# Patient Record
Sex: FEMALE | Race: WHITE | Employment: OTHER | URBAN - METROPOLITAN AREA
[De-identification: names, ages, dates, MRNs, and addresses within clinical notes are randomized per-mention and may not be internally consistent; named-entity substitution may affect disease eponyms.]

---

## 2017-01-06 ENCOUNTER — TELEPHONE (OUTPATIENT)
Dept: OBGYN CLINIC | Facility: CLINIC | Age: 42
End: 2017-01-06

## 2017-01-06 NOTE — TELEPHONE ENCOUNTER
Pt notified. Verbalized understanding. Pt requesting sooner apt. Per irina Macdonald to schedule  1/12/17 in [facility][1]. Pt scheduled. Call if any questions or concerns.

## 2017-01-06 NOTE — TELEPHONE ENCOUNTER
Pt c/o cramping with menses. Cramping 7/10, no improvement with   Ibuprofen or tylenol. LMP 1/4/17. Pt stopped ocp's due to migraines and PCP rec. Pt advised Dr. Alessandra Hollis does not rec ocp's and will disc.  Options at apt 2/1/17  Pt did well on ocp's

## 2017-01-12 ENCOUNTER — OFFICE VISIT (OUTPATIENT)
Dept: OBGYN CLINIC | Facility: CLINIC | Age: 42
End: 2017-01-12

## 2017-01-12 VITALS — SYSTOLIC BLOOD PRESSURE: 118 MMHG | WEIGHT: 194 LBS | BODY MASS INDEX: 31 KG/M2 | DIASTOLIC BLOOD PRESSURE: 80 MMHG

## 2017-01-12 DIAGNOSIS — L91.8 SKIN TAG: ICD-10-CM

## 2017-01-12 DIAGNOSIS — N92.0 MENORRHAGIA WITH REGULAR CYCLE: Primary | ICD-10-CM

## 2017-01-12 DIAGNOSIS — N94.6 DYSMENORRHEA: ICD-10-CM

## 2017-01-12 PROCEDURE — 11200 RMVL SKIN TAGS UP TO&INC 15: CPT | Performed by: OBSTETRICS & GYNECOLOGY

## 2017-01-12 PROCEDURE — 99213 OFFICE O/P EST LOW 20 MIN: CPT | Performed by: OBSTETRICS & GYNECOLOGY

## 2017-01-12 PROCEDURE — 88304 TISSUE EXAM BY PATHOLOGIST: CPT | Performed by: OBSTETRICS & GYNECOLOGY

## 2017-01-12 RX ORDER — LIDOCAINE HYDROCHLORIDE AND EPINEPHRINE 10; 10 MG/ML; UG/ML
3 INJECTION, SOLUTION INFILTRATION; PERINEURAL ONCE
Status: COMPLETED | OUTPATIENT
Start: 2017-01-12 | End: 2017-01-12

## 2017-01-12 RX ADMIN — LIDOCAINE HYDROCHLORIDE AND EPINEPHRINE 3 ML: 10; 10 INJECTION, SOLUTION INFILTRATION; PERINEURAL at 14:42:00

## 2017-01-12 NOTE — PROGRESS NOTES
GYN H&P     2017  2:41 PM    CC: Patient presents with: Other: Patient here to discuss birth control options, has painful and heavy periods. Also, has a skin tag she'd like removed.       HPI: patient is a 46year old  here for Patient pr ENDOSCOPY,BIOPSY N/A 5/17/2016    Comment Procedure: ESOPHAGOGASTRODUODENOSCOPY, POSSIBLE BIOPSY, POSSIBLE POLYPECTOMY 83135;  Surgeon: Orestes East MD;  Location: [facility]    COLPOSCOPY, CERVIX W/UPPER ADJACENT VAGINA; W/BIOPSY(S), CERVIX  20 Lipids Maternal Grandfather    • Diabetes Maternal Grandfather    • Diabetes Paternal Grandmother    • Other[other] [OTHER] Son      Duchennes muscular dystrophy       Social History   Marital Status:   Spouse Name: N/A    Years of Education: N/A  N tender, normal size                     Cervix: parous, no lesions                     Adnexa: non tender, no masses, normal size           EXTREMITIES:  non tender without edema    Skin tag removal Procedure  Procedure:     All risks and benefits were disc

## 2017-01-18 RX ORDER — ALPRAZOLAM 0.25 MG/1
TABLET ORAL
Qty: 15 TABLET | Refills: 0 | Status: SHIPPED | OUTPATIENT
Start: 2017-01-18 | End: 2017-05-31

## 2017-01-18 RX ORDER — PROPRANOLOL HYDROCHLORIDE 40 MG/1
TABLET ORAL
Qty: 30 TABLET | Refills: 0 | Status: SHIPPED | OUTPATIENT
Start: 2017-01-18 | End: 2017-03-08

## 2017-01-18 NOTE — TELEPHONE ENCOUNTER
LOV 12/09/2016    Last refill    PROPRANOLOL HCL 40 MG Oral Tab 30 tablet 5 6/10/2016      Sig :  TAKE 1 TABLET BY MOUTH ONCE DAILY      alprazolam 0.25 MG Oral Tab 15 tablet 0 12/7/2016      Sig :  Take 1 tablet (0.25 mg total) by mouth daily as needed fo

## 2017-01-20 RX ORDER — ALPRAZOLAM 0.25 MG/1
TABLET ORAL
Qty: 15 TABLET | Refills: 0 | OUTPATIENT
Start: 2017-01-20

## 2017-01-20 NOTE — TELEPHONE ENCOUNTER
LOV:; 12/9/16 for sinusitis    ALPRAZOLAM 0.25 MG Oral Tab 15 tablet 0 1/18/2017       Sig :  TAKE 1 TABLET BY MOUTH DAILY AS NEEDED FOR ANXIETY       Route:   (none)       Future Appointments  Date Time Provider Venkata Guillaume   2/1/2017 11:30 AM Luis Alberto

## 2017-02-06 ENCOUNTER — OFFICE VISIT (OUTPATIENT)
Dept: FAMILY MEDICINE CLINIC | Facility: CLINIC | Age: 42
End: 2017-02-06

## 2017-02-06 VITALS
SYSTOLIC BLOOD PRESSURE: 124 MMHG | BODY MASS INDEX: 31 KG/M2 | WEIGHT: 194 LBS | DIASTOLIC BLOOD PRESSURE: 80 MMHG | HEART RATE: 68 BPM | RESPIRATION RATE: 14 BRPM | TEMPERATURE: 98 F

## 2017-02-06 DIAGNOSIS — R00.2 PALPITATIONS: Primary | ICD-10-CM

## 2017-02-06 LAB
ALBUMIN SERPL-MCNC: 4.1 G/DL (ref 3.5–4.8)
ALP LIVER SERPL-CCNC: 99 U/L (ref 37–98)
ALT SERPL-CCNC: 29 U/L (ref 14–54)
AST SERPL-CCNC: 20 U/L (ref 15–41)
BASOPHILS # BLD AUTO: 0.04 X10(3) UL (ref 0–0.1)
BASOPHILS NFR BLD AUTO: 0.5 %
BILIRUB SERPL-MCNC: 0.5 MG/DL (ref 0.1–2)
BUN BLD-MCNC: 16 MG/DL (ref 8–20)
CALCIUM BLD-MCNC: 9.5 MG/DL (ref 8.3–10.3)
CHLORIDE: 104 MMOL/L (ref 101–111)
CO2: 28 MMOL/L (ref 22–32)
CREAT BLD-MCNC: 0.96 MG/DL (ref 0.55–1.02)
EOSINOPHIL # BLD AUTO: 0.12 X10(3) UL (ref 0–0.3)
EOSINOPHIL NFR BLD AUTO: 1.5 %
ERYTHROCYTE [DISTWIDTH] IN BLOOD BY AUTOMATED COUNT: 13.7 % (ref 11.5–16)
GLUCOSE BLD-MCNC: 106 MG/DL (ref 70–99)
HCT VFR BLD AUTO: 38.8 % (ref 34–50)
HGB BLD-MCNC: 13.2 G/DL (ref 12–16)
IMMATURE GRANULOCYTE COUNT: 0.03 X10(3) UL (ref 0–1)
IMMATURE GRANULOCYTE RATIO %: 0.4 %
LYMPHOCYTES # BLD AUTO: 2.02 X10(3) UL (ref 0.9–4)
LYMPHOCYTES NFR BLD AUTO: 24.9 %
M PROTEIN MFR SERPL ELPH: 7.9 G/DL (ref 6.1–8.3)
MCH RBC QN AUTO: 29.7 PG (ref 27–33.2)
MCHC RBC AUTO-ENTMCNC: 34 G/DL (ref 31–37)
MCV RBC AUTO: 87.2 FL (ref 81–100)
MONOCYTES # BLD AUTO: 0.45 X10(3) UL (ref 0.1–0.6)
MONOCYTES NFR BLD AUTO: 5.5 %
NEUTROPHIL ABS PRELIM: 5.46 X10 (3) UL (ref 1.3–6.7)
NEUTROPHILS # BLD AUTO: 5.46 X10(3) UL (ref 1.3–6.7)
NEUTROPHILS NFR BLD AUTO: 67.2 %
PLATELET # BLD AUTO: 330 10(3)UL (ref 150–450)
POTASSIUM SERPL-SCNC: 3.7 MMOL/L (ref 3.6–5.1)
RBC # BLD AUTO: 4.45 X10(6)UL (ref 3.8–5.1)
RED CELL DISTRIBUTION WIDTH-SD: 44.2 FL (ref 35.1–46.3)
SODIUM SERPL-SCNC: 140 MMOL/L (ref 136–144)
TSI SER-ACNC: 1.24 MIU/ML (ref 0.35–5.5)
WBC # BLD AUTO: 8.1 X10(3) UL (ref 4–13)

## 2017-02-06 PROCEDURE — 36415 COLL VENOUS BLD VENIPUNCTURE: CPT | Performed by: FAMILY MEDICINE

## 2017-02-06 PROCEDURE — 99214 OFFICE O/P EST MOD 30 MIN: CPT | Performed by: FAMILY MEDICINE

## 2017-02-06 PROCEDURE — 84443 ASSAY THYROID STIM HORMONE: CPT | Performed by: FAMILY MEDICINE

## 2017-02-06 PROCEDURE — 85025 COMPLETE CBC W/AUTO DIFF WBC: CPT | Performed by: FAMILY MEDICINE

## 2017-02-06 PROCEDURE — 80050 GENERAL HEALTH PANEL: CPT | Performed by: FAMILY MEDICINE

## 2017-02-06 PROCEDURE — 80053 COMPREHEN METABOLIC PANEL: CPT | Performed by: FAMILY MEDICINE

## 2017-02-06 RX ORDER — VENLAFAXINE HYDROCHLORIDE 150 MG/1
150 TABLET, EXTENDED RELEASE ORAL
COMMUNITY
End: 2017-03-31

## 2017-02-10 ENCOUNTER — HOSPITAL ENCOUNTER (OUTPATIENT)
Dept: CV DIAGNOSTICS | Facility: HOSPITAL | Age: 42
Discharge: HOME | End: 2017-02-10
Attending: FAMILY MEDICINE
Payer: COMMERCIAL

## 2017-02-10 DIAGNOSIS — R00.2 PALPITATIONS: ICD-10-CM

## 2017-02-10 PROCEDURE — 93350 STRESS TTE ONLY: CPT

## 2017-02-10 PROCEDURE — 93018 CV STRESS TEST I&R ONLY: CPT | Performed by: FAMILY MEDICINE

## 2017-02-10 PROCEDURE — 93017 CV STRESS TEST TRACING ONLY: CPT

## 2017-02-10 PROCEDURE — 93350 STRESS TTE ONLY: CPT | Performed by: FAMILY MEDICINE

## 2017-02-18 ENCOUNTER — OFFICE VISIT (OUTPATIENT)
Dept: FAMILY MEDICINE CLINIC | Facility: CLINIC | Age: 42
End: 2017-02-18

## 2017-02-18 VITALS
TEMPERATURE: 99 F | WEIGHT: 191.38 LBS | BODY MASS INDEX: 31 KG/M2 | DIASTOLIC BLOOD PRESSURE: 80 MMHG | RESPIRATION RATE: 14 BRPM | SYSTOLIC BLOOD PRESSURE: 122 MMHG | HEART RATE: 80 BPM

## 2017-02-18 DIAGNOSIS — M54.6 ACUTE LEFT-SIDED THORACIC BACK PAIN: Primary | ICD-10-CM

## 2017-02-18 PROCEDURE — 99213 OFFICE O/P EST LOW 20 MIN: CPT | Performed by: FAMILY MEDICINE

## 2017-02-18 RX ORDER — CYCLOBENZAPRINE HCL 10 MG
10 TABLET ORAL 3 TIMES DAILY
Qty: 20 TABLET | Refills: 0 | Status: SHIPPED | OUTPATIENT
Start: 2017-02-18 | End: 2017-03-10

## 2017-02-18 NOTE — PROGRESS NOTES
HPI:    Patient ID: Johnny Lane is a 46year old female. Patient presents with:  Musculoskeletal Problem: left sided shoulder blade pain    HPI  Pt c/o pain behind left shoulder blade.   Onset: 2 weeks  Aggravated by: reaching behind back , twist Procedure: ESOPHAGOGASTRODUODENOSCOPY, POSSIBLE BIOPSY, POSSIBLE POLYPECTOMY 78245;  Surgeon: Solomon Dela Cruz MD;  Location: [facility]    COLPOSCOPY, CERVIX W/UPPER ADJACENT VAGINA; W/BIOPSY(S), CERVIX  2008    Comment HPV+    GASTRO - DMG  8/16 bony tenderness and no spasm. Neurological:   Reflex Scores:       Bicep reflexes are 2+ on the right side and 2+ on the left side. 5/5  and UE strength     Skin: No rash noted. Vitals reviewed. Blood pressure 122/80, pulse 80, temperature 98. 5

## 2017-03-08 RX ORDER — PROPRANOLOL HYDROCHLORIDE 40 MG/1
TABLET ORAL
Qty: 30 TABLET | Refills: 2 | Status: SHIPPED | OUTPATIENT
Start: 2017-03-08 | End: 2017-06-28

## 2017-03-08 NOTE — TELEPHONE ENCOUNTER
LOV  02/18/2017    Last refill    PROPRANOLOL HCL 40 MG Oral Tab 30 tablet 0 1/18/2017      Sig :  TAKE 1 TABLET BY MOUTH EVERY DAY      Medication pending. Please advise.     Future Appointments  Date Time Provider Venkata Guillaume   4/6/2017 10:30 AM Ca

## 2017-03-31 NOTE — PROGRESS NOTES
HPI:    Patient ID: Magda Rdz is a 46year old female. Patient presents with:  Medication Follow-Up: depression    HPI  F/U on anxiety and depression  Feels like medication is not working as well past 2 months. Lack of motivation.  Feels moo Psych. Refer to [facility] for psychiatrist consult and set up with therapist.  F/U in 4 weeks if not estab with specialist    No orders of the defined types were placed in this encounter.        Meds This Visit:  Signed Prescriptions Disp Refil

## 2017-04-03 ENCOUNTER — TELEPHONE (OUTPATIENT)
Dept: FAMILY MEDICINE CLINIC | Facility: CLINIC | Age: 42
End: 2017-04-03

## 2017-04-06 ENCOUNTER — OFFICE VISIT (OUTPATIENT)
Dept: OBGYN CLINIC | Facility: CLINIC | Age: 42
End: 2017-04-06

## 2017-04-06 VITALS
SYSTOLIC BLOOD PRESSURE: 112 MMHG | WEIGHT: 191 LBS | DIASTOLIC BLOOD PRESSURE: 80 MMHG | HEIGHT: 66 IN | BODY MASS INDEX: 30.7 KG/M2

## 2017-04-06 DIAGNOSIS — N92.0 MENORRHAGIA WITH REGULAR CYCLE: ICD-10-CM

## 2017-04-06 DIAGNOSIS — Z01.812 PRE-PROCEDURE LAB EXAM: ICD-10-CM

## 2017-04-06 DIAGNOSIS — Z30.430 ENCOUNTER FOR INSERTION OF INTRAUTERINE CONTRACEPTIVE DEVICE: Primary | ICD-10-CM

## 2017-04-06 PROCEDURE — 58300 INSERT INTRAUTERINE DEVICE: CPT | Performed by: OBSTETRICS & GYNECOLOGY

## 2017-04-06 PROCEDURE — 81025 URINE PREGNANCY TEST: CPT | Performed by: OBSTETRICS & GYNECOLOGY

## 2017-04-06 RX ORDER — SUMATRIPTAN 50 MG/1
TABLET, FILM COATED ORAL
Qty: 10 TABLET | Refills: 0 | Status: SHIPPED | OUTPATIENT
Start: 2017-04-06 | End: 2017-05-22

## 2017-04-06 NOTE — TELEPHONE ENCOUNTER
LOV: 3/31/17 for anxiety and depression    SUMAtriptan Succinate (IMITREX) 50 MG Oral Tab 10 tablet 0 1/23/2015       Sig :  Take 1 tablet by mouth daily as needed for Migraine.       Route:   Oral       PRN Reason(s): [employer]       Future Appointments

## 2017-04-06 NOTE — PROGRESS NOTES
GYN H&P     2017  10:45 AM    CC: Patient presents with:  IUD      HPI: patient is a 46year old  here for Patient presents with:  IUD      Pt is here Mirena IUD placement. Pt has heavy menstrual bleeding. CBC in  wnl 13.2 hgb and tsh wnl.  P d/t ibuprofen use             and is not supposed to take it    Current Outpatient Prescriptions on File Prior to Visit:  Venlafaxine HCl  MG Oral Tablet 24 Hr Take 1 tab PO daily Disp: 30 tablet Rfl: 1   PROPRANOLOL HCL 40 MG Oral Tab TAKE 1 TABLET Genitourinary: Negative for dysuria, hematuria and difficulty urinating. Musculoskeletal: Negative for myalgias, back pain, joint swelling, arthralgias   Skin: Negative for color change and rash.          O /80 mmHg  Ht 66\"  Wt 191 lb  BMI 30.84 procedure well  - Levonorgestrel IUD 20 mcg; 20 mcg by Intrauterine route once. - Insert IUD [63983]    2.  Menorrhagia with regular cycle  -pt declined US due to out of pocket expense, will trial Mirena IUD, if no improvement in bleeding pattern recommen

## 2017-05-02 PROBLEM — F33.2 SEVERE EPISODE OF RECURRENT MAJOR DEPRESSIVE DISORDER, WITHOUT PSYCHOTIC FEATURES (HCC): Status: ACTIVE | Noted: 2017-05-02

## 2017-05-04 ENCOUNTER — OFFICE VISIT (OUTPATIENT)
Dept: OBGYN CLINIC | Facility: CLINIC | Age: 42
End: 2017-05-04

## 2017-05-04 VITALS
WEIGHT: 184 LBS | SYSTOLIC BLOOD PRESSURE: 106 MMHG | BODY MASS INDEX: 29.57 KG/M2 | HEIGHT: 66 IN | HEART RATE: 70 BPM | DIASTOLIC BLOOD PRESSURE: 62 MMHG

## 2017-05-04 DIAGNOSIS — Z30.431 IUD CHECK UP: Primary | ICD-10-CM

## 2017-05-04 PROCEDURE — 99212 OFFICE O/P EST SF 10 MIN: CPT | Performed by: OBSTETRICS & GYNECOLOGY

## 2017-05-04 RX ORDER — OMEPRAZOLE 40 MG/1
CAPSULE, DELAYED RELEASE ORAL
Refills: 4 | COMMUNITY
Start: 2017-05-01 | End: 2018-04-26

## 2017-05-04 NOTE — PROGRESS NOTES
GYN H&P     5/4/2017  11:35 AM    CC: Patient presents with: Other: IUD f/u      HPI: patient is a 46year old [id number] here for Patient presents with: Other: IUD f/u      Pt is here for an IUD check up. IUD was placed on 4/6.    Pt has mild cramping dur upon awakening and in the afternoon Disp: 90 tablet Rfl: 0   Venlafaxine HCl  MG Oral Capsule SR 24 Hr Take 1 capsule (150 mg total) by mouth daily.  Disp: 30 capsule Rfl: 2   SUMATRIPTAN SUCCINATE 50 MG Oral Tab TAKE 1 TABLET(50 MG) BY MOUTH EVERY 2 and wheezing. Cardiovascular: Negative for chest pain, palpitations and leg swelling. Gastrointestinal: Negative for nausea, vomiting, abdominal pain, diarrhea, blood in stool   Genitourinary: Negative for dysuria, hematuria and difficulty urinating.

## 2017-05-17 ENCOUNTER — PATIENT MESSAGE (OUTPATIENT)
Dept: OBGYN CLINIC | Facility: CLINIC | Age: 42
End: 2017-05-17

## 2017-05-17 DIAGNOSIS — Z97.5 IUD (INTRAUTERINE DEVICE) IN PLACE: Primary | ICD-10-CM

## 2017-05-17 NOTE — TELEPHONE ENCOUNTER
From: Cliff Madrid  To: Keith Laurent MD  Sent: 5/17/2017 1:37 PM CDT  Subject: Visit Marlin Sheppard,    I think I should go and have the ultrasound that we talked about to make sure nothing else is going on.      Since having

## 2017-05-18 NOTE — TELEPHONE ENCOUNTER
Please order pelvic US through radiology with pt and please inform pt cramps should improve but is best to check position of IUD.

## 2017-05-22 RX ORDER — SUMATRIPTAN 50 MG/1
TABLET, FILM COATED ORAL
Qty: 10 TABLET | Refills: 0 | Status: SHIPPED | OUTPATIENT
Start: 2017-05-22 | End: 2017-06-07

## 2017-05-22 NOTE — TELEPHONE ENCOUNTER
LOV  03/31/2017  Last refill    SUMATRIPTAN SUCCINATE 50 MG Oral Tab 10 tablet 0 4/6/2017      Sig :  TAKE 1 TABLET(50 MG) BY MOUTH EVERY 2 HOURS AS NEEDED FOR MIGRAINE. DO NOT EXCEED 200 MG PER DAY      Medication pending. Please advise.

## 2017-05-31 ENCOUNTER — HOSPITAL ENCOUNTER (OUTPATIENT)
Age: 42
Discharge: HOME | End: 2017-05-31
Payer: COMMERCIAL

## 2017-05-31 VITALS
HEART RATE: 80 BPM | BODY MASS INDEX: 28.93 KG/M2 | WEIGHT: 180 LBS | SYSTOLIC BLOOD PRESSURE: 118 MMHG | OXYGEN SATURATION: 99 % | RESPIRATION RATE: 18 BRPM | HEIGHT: 66 IN | DIASTOLIC BLOOD PRESSURE: 75 MMHG | TEMPERATURE: 98 F

## 2017-05-31 DIAGNOSIS — L02.91 ABSCESS: Primary | ICD-10-CM

## 2017-05-31 PROCEDURE — 87070 CULTURE OTHR SPECIMN AEROBIC: CPT | Performed by: NURSE PRACTITIONER

## 2017-05-31 PROCEDURE — 99214 OFFICE O/P EST MOD 30 MIN: CPT

## 2017-05-31 PROCEDURE — 87205 SMEAR GRAM STAIN: CPT | Performed by: NURSE PRACTITIONER

## 2017-05-31 PROCEDURE — 10060 I&D ABSCESS SIMPLE/SINGLE: CPT

## 2017-05-31 RX ORDER — SULFAMETHOXAZOLE AND TRIMETHOPRIM 800; 160 MG/1; MG/1
1 TABLET ORAL 2 TIMES DAILY
Qty: 20 TABLET | Refills: 0 | Status: SHIPPED | OUTPATIENT
Start: 2017-05-31 | End: 2017-06-07

## 2017-05-31 RX ORDER — ALPRAZOLAM 0.25 MG/1
TABLET ORAL
Qty: 15 TABLET | Refills: 0 | OUTPATIENT
Start: 2017-05-31 | End: 2019-10-16

## 2017-05-31 NOTE — ED INITIAL ASSESSMENT (HPI)
Pt has 2 abscess to groin area. Pt states there for past 2 weeks. Pt trying hot compresses without relief.   Pt states is a reoccuring issue for her

## 2017-05-31 NOTE — TELEPHONE ENCOUNTER
Last refilled on 1/18/17 for # 15 with 0 rf. Last seen on 3/31/17. Future Appointments  Date Time Provider Venkata Guillaume   6/6/2017 11:00 AM JESSICA Lucio [facility]        Thank you.

## 2017-06-07 ENCOUNTER — OFFICE VISIT (OUTPATIENT)
Dept: FAMILY MEDICINE CLINIC | Facility: CLINIC | Age: 42
End: 2017-06-07

## 2017-06-07 VITALS
DIASTOLIC BLOOD PRESSURE: 60 MMHG | RESPIRATION RATE: 20 BRPM | OXYGEN SATURATION: 98 % | HEART RATE: 96 BPM | SYSTOLIC BLOOD PRESSURE: 92 MMHG | WEIGHT: 183 LBS | BODY MASS INDEX: 30 KG/M2 | TEMPERATURE: 98 F

## 2017-06-07 DIAGNOSIS — M25.511 CHRONIC RIGHT SHOULDER PAIN: ICD-10-CM

## 2017-06-07 DIAGNOSIS — M54.2 NECK PAIN ON RIGHT SIDE: Primary | ICD-10-CM

## 2017-06-07 DIAGNOSIS — G89.29 CHRONIC RIGHT SHOULDER PAIN: ICD-10-CM

## 2017-06-07 PROCEDURE — 99214 OFFICE O/P EST MOD 30 MIN: CPT | Performed by: FAMILY MEDICINE

## 2017-06-07 RX ORDER — METAXALONE 800 MG/1
800 TABLET ORAL 3 TIMES DAILY
Qty: 30 TABLET | Refills: 0 | Status: SHIPPED | OUTPATIENT
Start: 2017-06-07 | End: 2017-06-27

## 2017-06-07 NOTE — PROGRESS NOTES
HPI:    Patient ID: Tino Monroe is a 46year old female. Patient presents with:  Neck Pain: right sided neck and shoulder pain    HPI   Here for right sided neck pain. Has hx of chronic pain in neck and right shoulder for few years.   Has been s UPPER GI ENDO NO BARRETTS  5/16    Comment esophageal ulcer; HH    UPPER GI ENDOSCOPY,BIOPSY N/A 5/17/2016    Comment Procedure: ESOPHAGOGASTRODUODENOSCOPY, POSSIBLE BIOPSY, POSSIBLE POLYPECTOMY 45317;  Surgeon: Rhoda Mike MD;  Location: [facility] exhibits spasm (right side). She exhibits no bony tenderness and no swelling. Thoracic back: She exhibits no bony tenderness and no spasm. Neurological: She has normal strength.    Reflex Scores:       Bicep reflexes are 2+ on the right side and 2+

## 2017-06-09 ENCOUNTER — OFFICE VISIT (OUTPATIENT)
Dept: PHYSICAL THERAPY | Age: 42
End: 2017-06-09
Attending: FAMILY MEDICINE
Payer: COMMERCIAL

## 2017-06-09 DIAGNOSIS — G89.29 CHRONIC RIGHT SHOULDER PAIN: ICD-10-CM

## 2017-06-09 DIAGNOSIS — M54.2 NECK PAIN ON RIGHT SIDE: Primary | ICD-10-CM

## 2017-06-09 DIAGNOSIS — M25.511 CHRONIC RIGHT SHOULDER PAIN: ICD-10-CM

## 2017-06-09 PROCEDURE — 97162 PT EVAL MOD COMPLEX 30 MIN: CPT

## 2017-06-09 PROCEDURE — 97110 THERAPEUTIC EXERCISES: CPT

## 2017-06-09 NOTE — PROGRESS NOTES
SPINE EVALUATION:   Referring Physician: Dr. Dominick Wen  Diagnosis: Neck pain on right side (M54.2)  Chronic right shoulder pain (M25.511,G89.29)    Date of Service: 6/9/2017     PATIENT SUMMARY   Mandy Miller is a 46year old y/o female who presents gait  Neuro Screen:    Sensation:  Bilat UE's grossly intact to light touch all dermatomes   Denies changes in bowel/bladder function   Cervical ROM:   (degrees) pt notes improvement since last week, painful R with bilat SB and rotation  Flexion:         4 Treatment: 15 min     Total Treatment Time: 50 min   In agreement with FOTO score and clinical rationale, this evaluation involved Moderate Complexity decision making due to 1-2 personal factors/comorbidities, 3 body structures involved/activity limitation 6/9/2017  To:9/7/2017

## 2017-06-13 ENCOUNTER — TELEPHONE (OUTPATIENT)
Dept: PHYSICAL THERAPY | Age: 42
End: 2017-06-13

## 2017-06-14 ENCOUNTER — APPOINTMENT (OUTPATIENT)
Dept: PHYSICAL THERAPY | Age: 42
End: 2017-06-14
Attending: FAMILY MEDICINE
Payer: COMMERCIAL

## 2017-06-16 ENCOUNTER — APPOINTMENT (OUTPATIENT)
Dept: PHYSICAL THERAPY | Age: 42
End: 2017-06-16
Attending: FAMILY MEDICINE
Payer: COMMERCIAL

## 2017-06-19 RX ORDER — SUMATRIPTAN 50 MG/1
TABLET, FILM COATED ORAL
Qty: 10 TABLET | Refills: 0 | OUTPATIENT
Start: 2017-06-19

## 2017-06-19 NOTE — TELEPHONE ENCOUNTER
LOV: 6/7/17 for neck pain    SUMAtriptan Succinate 50 MG Oral Tab 10 tablet 0 5/22/2017 6/7/2017      Sig:  TAKE 1 TABLET(50 MG) BY MOUTH EVERY 2 HOURS AS NEEDED FOR MIGRAINE.  DO NOT EXCEED 200 MG PER DAY     Class:  Normal     JUAN CARLOS:  No     Future Appointm

## 2017-06-21 ENCOUNTER — APPOINTMENT (OUTPATIENT)
Dept: PHYSICAL THERAPY | Age: 42
End: 2017-06-21
Attending: FAMILY MEDICINE
Payer: COMMERCIAL

## 2017-06-22 RX ORDER — SUMATRIPTAN 50 MG/1
TABLET, FILM COATED ORAL
Qty: 10 TABLET | Refills: 0 | OUTPATIENT
Start: 2017-06-22

## 2017-06-23 ENCOUNTER — APPOINTMENT (OUTPATIENT)
Dept: PHYSICAL THERAPY | Age: 42
End: 2017-06-23
Attending: FAMILY MEDICINE
Payer: COMMERCIAL

## 2017-06-26 ENCOUNTER — APPOINTMENT (OUTPATIENT)
Dept: PHYSICAL THERAPY | Age: 42
End: 2017-06-26
Attending: FAMILY MEDICINE
Payer: COMMERCIAL

## 2017-06-28 ENCOUNTER — APPOINTMENT (OUTPATIENT)
Dept: PHYSICAL THERAPY | Age: 42
End: 2017-06-28
Attending: FAMILY MEDICINE
Payer: COMMERCIAL

## 2017-06-29 RX ORDER — PROPRANOLOL HYDROCHLORIDE 40 MG/1
TABLET ORAL
Qty: 30 TABLET | Refills: 2 | Status: SHIPPED | OUTPATIENT
Start: 2017-06-29 | End: 2017-10-23

## 2017-06-29 NOTE — TELEPHONE ENCOUNTER
LOV: 6/7/17 for neck pain  BP at time of visit: BP 92/60     PROPRANOLOL HCL 40 MG Oral Tab 30 tablet 2 3/8/2017    Sig :  TAKE 1 TABLET BY MOUTH EVERY DAY     Route:   (none)       No future appointments. Please advise.

## 2017-07-03 ENCOUNTER — APPOINTMENT (OUTPATIENT)
Dept: PHYSICAL THERAPY | Age: 42
End: 2017-07-03
Attending: FAMILY MEDICINE
Payer: COMMERCIAL

## 2017-07-07 RX ORDER — SUMATRIPTAN 50 MG/1
TABLET, FILM COATED ORAL
Qty: 10 TABLET | Refills: 0 | Status: SHIPPED | OUTPATIENT
Start: 2017-07-07 | End: 2017-10-13 | Stop reason: DRUGHIGH

## 2017-07-07 NOTE — TELEPHONE ENCOUNTER
LOV: 6/7/17 neck pain    SUMAtriptan Succinate (IMITREX) 50 MG Oral Tab 10 tablet 0 6/7/2017    Sig :  Take 1 tablet by mouth daily as needed for Migraine.      Route:   Oral     PRN Reason(s): [employer]       Future Appointments  Date Time Provider Depart

## 2017-08-07 ENCOUNTER — HOSPITAL ENCOUNTER (OUTPATIENT)
Dept: ULTRASOUND IMAGING | Age: 42
Discharge: HOME | End: 2017-08-07
Attending: OBSTETRICS & GYNECOLOGY
Payer: COMMERCIAL

## 2017-08-07 DIAGNOSIS — Z97.5 IUD (INTRAUTERINE DEVICE) IN PLACE: ICD-10-CM

## 2017-08-07 PROCEDURE — 76830 TRANSVAGINAL US NON-OB: CPT | Performed by: OBSTETRICS & GYNECOLOGY

## 2017-08-07 PROCEDURE — 76856 US EXAM PELVIC COMPLETE: CPT | Performed by: OBSTETRICS & GYNECOLOGY

## 2017-10-02 ENCOUNTER — HOSPITAL ENCOUNTER (OUTPATIENT)
Dept: GENERAL RADIOLOGY | Age: 42
Discharge: HOME | End: 2017-10-02
Attending: FAMILY MEDICINE
Payer: COMMERCIAL

## 2017-10-02 ENCOUNTER — OFFICE VISIT (OUTPATIENT)
Dept: FAMILY MEDICINE CLINIC | Facility: CLINIC | Age: 42
End: 2017-10-02

## 2017-10-02 VITALS
RESPIRATION RATE: 18 BRPM | SYSTOLIC BLOOD PRESSURE: 116 MMHG | TEMPERATURE: 98 F | HEART RATE: 64 BPM | DIASTOLIC BLOOD PRESSURE: 84 MMHG | HEIGHT: 66 IN | BODY MASS INDEX: 28.15 KG/M2 | WEIGHT: 175.19 LBS

## 2017-10-02 DIAGNOSIS — K59.00 CONSTIPATION, UNSPECIFIED CONSTIPATION TYPE: Primary | ICD-10-CM

## 2017-10-02 DIAGNOSIS — K59.00 CONSTIPATION, UNSPECIFIED CONSTIPATION TYPE: ICD-10-CM

## 2017-10-02 PROCEDURE — 74000 XR ABDOMEN (1 VIEW) (CPT=74000): CPT | Performed by: FAMILY MEDICINE

## 2017-10-02 PROCEDURE — 99213 OFFICE O/P EST LOW 20 MIN: CPT | Performed by: FAMILY MEDICINE

## 2017-10-02 RX ORDER — BUPROPION HYDROCHLORIDE 100 MG/1
150 TABLET ORAL 2 TIMES DAILY
Refills: 2 | COMMUNITY
Start: 2017-09-05 | End: 2018-04-09

## 2017-10-02 RX ORDER — VENLAFAXINE HYDROCHLORIDE 150 MG/1
1 CAPSULE, EXTENDED RELEASE ORAL DAILY
Refills: 2 | COMMUNITY
Start: 2017-09-05 | End: 2018-03-06

## 2017-10-02 NOTE — PROGRESS NOTES
HPI:    Patient ID: Latricia Hernandez is a 46year old female. Patient presents with:  Stomach Pain    HPI   C/O constipation past 6-8 months  Has BM every 1-3 days. Hard stools and strains most of the time.   Started having intermittent abdominal pain unspecified    • Depression    • Dysmenorrhea    • Esophageal reflux    • History of stomach ulcers    • Migraine    • Migraines    • Other and unspecified hyperlipidemia       Past Surgical History:  2008: COLPOSCOPY, CERVIX W/UPPER ADJACENT VAGINA; W/* normal and breath sounds normal.   Abdominal: Soft. Bowel sounds are normal. She exhibits no distension and no mass. There is no hepatosplenomegaly. There is no tenderness. There is no CVA tenderness. No hernia. Vitals reviewed.   Blood pressure 116/84, p

## 2017-10-03 ENCOUNTER — TELEPHONE (OUTPATIENT)
Dept: FAMILY MEDICINE CLINIC | Facility: CLINIC | Age: 42
End: 2017-10-03

## 2017-10-03 NOTE — TELEPHONE ENCOUNTER
Patient would like x-ray results from 10/2/17. Patient does not need lab results - that was an error. Patient is aware Dr. Ivan Howe will be back in the office tomorrow.

## 2017-10-04 NOTE — TELEPHONE ENCOUNTER
LMTCB.    abd xray: Large amount of stool throughout the colon. Recommend:   1-2 doses of enema and miralax for next 1-2 weeks. Pt will call with update in 2 weeks.

## 2017-10-06 ENCOUNTER — TELEPHONE (OUTPATIENT)
Dept: FAMILY MEDICINE CLINIC | Facility: CLINIC | Age: 42
End: 2017-10-06

## 2017-10-06 ENCOUNTER — APPOINTMENT (OUTPATIENT)
Dept: CT IMAGING | Age: 42
End: 2017-10-06
Attending: FAMILY MEDICINE
Payer: COMMERCIAL

## 2017-10-06 ENCOUNTER — PATIENT MESSAGE (OUTPATIENT)
Dept: FAMILY MEDICINE CLINIC | Facility: CLINIC | Age: 42
End: 2017-10-06

## 2017-10-06 ENCOUNTER — HOSPITAL ENCOUNTER (OUTPATIENT)
Age: 42
Discharge: HOME | End: 2017-10-06
Attending: FAMILY MEDICINE
Payer: COMMERCIAL

## 2017-10-06 VITALS
DIASTOLIC BLOOD PRESSURE: 55 MMHG | OXYGEN SATURATION: 100 % | HEIGHT: 66 IN | WEIGHT: 173 LBS | HEART RATE: 74 BPM | SYSTOLIC BLOOD PRESSURE: 106 MMHG | RESPIRATION RATE: 16 BRPM | BODY MASS INDEX: 27.8 KG/M2 | TEMPERATURE: 98 F

## 2017-10-06 DIAGNOSIS — R16.1 SPLENOMEGALY: ICD-10-CM

## 2017-10-06 DIAGNOSIS — R10.32 ABDOMINAL PAIN, LEFT LOWER QUADRANT: ICD-10-CM

## 2017-10-06 DIAGNOSIS — R79.89 ELEVATED SERUM CREATININE: Primary | ICD-10-CM

## 2017-10-06 DIAGNOSIS — E86.0 DEHYDRATION: ICD-10-CM

## 2017-10-06 DIAGNOSIS — Z87.19 HISTORY OF CONSTIPATION: ICD-10-CM

## 2017-10-06 PROCEDURE — 74176 CT ABD & PELVIS W/O CONTRAST: CPT | Performed by: FAMILY MEDICINE

## 2017-10-06 PROCEDURE — 96360 HYDRATION IV INFUSION INIT: CPT

## 2017-10-06 PROCEDURE — 81002 URINALYSIS NONAUTO W/O SCOPE: CPT | Performed by: FAMILY MEDICINE

## 2017-10-06 PROCEDURE — 87086 URINE CULTURE/COLONY COUNT: CPT | Performed by: FAMILY MEDICINE

## 2017-10-06 PROCEDURE — 80047 BASIC METABLC PNL IONIZED CA: CPT

## 2017-10-06 PROCEDURE — 99214 OFFICE O/P EST MOD 30 MIN: CPT

## 2017-10-06 PROCEDURE — 85025 COMPLETE CBC W/AUTO DIFF WBC: CPT | Performed by: FAMILY MEDICINE

## 2017-10-06 PROCEDURE — 81025 URINE PREGNANCY TEST: CPT | Performed by: FAMILY MEDICINE

## 2017-10-06 PROCEDURE — 99215 OFFICE O/P EST HI 40 MIN: CPT

## 2017-10-06 RX ORDER — SODIUM CHLORIDE 9 MG/ML
1000 INJECTION, SOLUTION INTRAVENOUS ONCE
Status: COMPLETED | OUTPATIENT
Start: 2017-10-06 | End: 2017-10-06

## 2017-10-06 RX ORDER — ONDANSETRON 4 MG/1
4 TABLET, ORALLY DISINTEGRATING ORAL EVERY 8 HOURS PRN
Qty: 15 TABLET | Refills: 0 | Status: SHIPPED | OUTPATIENT
Start: 2017-10-06 | End: 2017-10-11

## 2017-10-06 NOTE — TELEPHONE ENCOUNTER
Has had some BM with enema and miralax. Still feels bloated. No vomiting, abd pain or fever  Call with update in 1 week. Go to IC if sx worsen in meantime. Pt agrees and expresses understandng of plan.

## 2017-10-06 NOTE — TELEPHONE ENCOUNTER
Pt was last seen Monday 10/2,  States she is still well, did send a detailed message on [employer]. Please call.

## 2017-10-06 NOTE — TELEPHONE ENCOUNTER
From: Sharan Jama  To: Stephanie Obrien MD  Sent: 10/6/2017 9:48 AM CDT  Subject: Visit Follow-up Question    Good morning,  I have a question about my appointment from the other day regarding constipation.      I have used 4 suppositories, tea CHRISTINE

## 2017-10-07 NOTE — ED INITIAL ASSESSMENT (HPI)
abd pain left lower 3 weeks, last week left lower abd pain, sm bowel movement with supp and enema, Dr. Aneesh Pichardo told her to come here and have a ct scan done, pain scale 5/10. Increase abd pain when she sits. + nausea, no vomiting, no diarrhea.

## 2017-10-07 NOTE — ED PROVIDER NOTES
Patient Seen in: [facility]    History   Patient presents with:  Abdominal Pain    Stated Complaint: Abdominal Pain     HPI  45 yo F here with complaints of abdominal pain   Hx of constipation - was seen by Dr Emily Brown a few weeks prior Ramonita Rod MD;  Location:                [facility]    Family History   Problem Relation Age of Onset   • Cancer Maternal Grandmother      lung, brain   • Cancer Maternal Grandfather    • Stroke Maternal Grandfather    • Lipids Maternal Grandf Abnormal; Notable for the following:        Result Value    Protein urine Trace (*)     Leukocyte esterase urine Trace (*)     All other components within normal limits   POCT CBC - Abnormal; Notable for the following:     HCT IC 36.0 (*)     # Neutrophil XR ABDOMEN (KUB) (1 AP VIEW)  (CPT=74000), 7/02/2016, 16:53. TECHNIQUE:  Supine AP view was obtained.   PATIENT STATED HISTORY: (As transcribed by Technologist)  Patient states to be constipated and with nausea for the past 6 months but it has been worse f [facility],  PELVIS EV (TRNS ABD AND ENDOVAG) (KKO=[id number]), 8/07/2017, 8:08. INDICATIONS:  Abdominal Pain  TECHNIQUE:  Unenhanced multislice CT scanning was performed from the dome of the diaphragm to the pubic symphysis.   Dose re ============================================================  ED Course  ------------------------------------------------------------  MDM         You should hydrate yourself well - it appears that you will need more hydration - today your kidney funct as of 10/6/2017  9:03 PM    START taking these medications    Magnesium Citrate Oral Solution  Take 296 mL by mouth once., Normal, Disp-296 mL, R-0    ondansetron 4 MG Oral Tablet Dispersible  Take 1 tablet (4 mg total) by mouth every 8 (eight) hours as ne

## 2017-10-11 RX ORDER — SUMATRIPTAN 50 MG/1
50 TABLET, FILM COATED ORAL EVERY 2 HOUR PRN
Qty: 10 TABLET | Refills: 0 | Status: CANCELLED | OUTPATIENT
Start: 2017-10-11

## 2017-10-11 NOTE — TELEPHONE ENCOUNTER
From: Rakesh Gillis  Sent: 10/11/2017 12:36 PM CDT  Subject: Medication Renewal Request    Sung Watkins.  Alexus Izaguirre would like a refill of the following medications:     SUMATRIPTAN SUCCINATE 50 MG Oral Tab Michelle Vallecillo MD]   Patient Comment: Can we

## 2017-10-13 ENCOUNTER — OFFICE VISIT (OUTPATIENT)
Dept: FAMILY MEDICINE CLINIC | Facility: CLINIC | Age: 42
End: 2017-10-13

## 2017-10-13 VITALS
HEART RATE: 92 BPM | TEMPERATURE: 98 F | BODY MASS INDEX: 28.03 KG/M2 | WEIGHT: 174.38 LBS | SYSTOLIC BLOOD PRESSURE: 106 MMHG | OXYGEN SATURATION: 99 % | RESPIRATION RATE: 16 BRPM | DIASTOLIC BLOOD PRESSURE: 68 MMHG | HEIGHT: 66 IN

## 2017-10-13 DIAGNOSIS — K62.89 RECTAL PAIN: ICD-10-CM

## 2017-10-13 DIAGNOSIS — K59.00 CONSTIPATION, UNSPECIFIED CONSTIPATION TYPE: Primary | ICD-10-CM

## 2017-10-13 PROCEDURE — 99213 OFFICE O/P EST LOW 20 MIN: CPT | Performed by: FAMILY MEDICINE

## 2017-10-13 RX ORDER — SUMATRIPTAN 100 MG/1
100 TABLET, FILM COATED ORAL DAILY PRN
Qty: 9 TABLET | Refills: 0 | Status: SHIPPED | OUTPATIENT
Start: 2017-10-13 | End: 2018-01-29

## 2017-10-13 NOTE — PROGRESS NOTES
HPI:    Patient ID: Jazzy Ross is a 46year old female. Patient presents with:  [facility] F/U    HPI   Here to follow up on abdominal discomfort and constipation.   Having daily BM past 1 week since starting miralax and watching diet more HISTORY:  Past Medical History:   Diagnosis Date   • Anxiety state, unspecified    • Depression    • Dysmenorrhea    • Esophageal reflux    • History of stomach ulcers    • Migraine    • Migraines    • Other and unspecified hyperlipidemia       Past Surgic Cardiovascular: Normal rate and regular rhythm. No murmur heard. Pulmonary/Chest: Effort normal and breath sounds normal.   Abdominal: Soft. Bowel sounds are normal. She exhibits no mass. There is no tenderness.        Genitourinary: Rectal exam shows e

## 2017-10-24 ENCOUNTER — TELEPHONE (OUTPATIENT)
Dept: FAMILY MEDICINE CLINIC | Facility: CLINIC | Age: 42
End: 2017-10-24

## 2017-10-24 RX ORDER — PROPRANOLOL HYDROCHLORIDE 40 MG/1
TABLET ORAL
Qty: 30 TABLET | Refills: 0 | Status: SHIPPED | OUTPATIENT
Start: 2017-10-24 | End: 2017-12-06

## 2017-10-24 NOTE — TELEPHONE ENCOUNTER
Pt is out of  Her RX  Propranolol HCl 40 MG-    Explained to the Pt that Dr He Norton was out of the office today,  Pt states she needs this as a Migraine preventative and is afraid she will start getting headaches. Wants to know if this can be filled today?

## 2017-10-26 ENCOUNTER — TELEPHONE (OUTPATIENT)
Dept: OBGYN CLINIC | Facility: CLINIC | Age: 42
End: 2017-10-26

## 2017-10-29 ENCOUNTER — APPOINTMENT (OUTPATIENT)
Dept: CT IMAGING | Age: 42
End: 2017-10-29
Attending: EMERGENCY MEDICINE
Payer: COMMERCIAL

## 2017-10-29 ENCOUNTER — HOSPITAL ENCOUNTER (EMERGENCY)
Age: 42
Discharge: HOME | End: 2017-10-29
Attending: EMERGENCY MEDICINE
Payer: COMMERCIAL

## 2017-10-29 VITALS
BODY MASS INDEX: 24 KG/M2 | SYSTOLIC BLOOD PRESSURE: 100 MMHG | DIASTOLIC BLOOD PRESSURE: 43 MMHG | WEIGHT: 150 LBS | HEART RATE: 88 BPM | TEMPERATURE: 98 F | OXYGEN SATURATION: 97 % | RESPIRATION RATE: 18 BRPM

## 2017-10-29 DIAGNOSIS — R10.32 ABDOMINAL PAIN, LEFT LOWER QUADRANT: Primary | ICD-10-CM

## 2017-10-29 DIAGNOSIS — K59.00 CONSTIPATION, UNSPECIFIED CONSTIPATION TYPE: ICD-10-CM

## 2017-10-29 PROCEDURE — 85025 COMPLETE CBC W/AUTO DIFF WBC: CPT | Performed by: EMERGENCY MEDICINE

## 2017-10-29 PROCEDURE — 81003 URINALYSIS AUTO W/O SCOPE: CPT | Performed by: EMERGENCY MEDICINE

## 2017-10-29 PROCEDURE — 74176 CT ABD & PELVIS W/O CONTRAST: CPT | Performed by: EMERGENCY MEDICINE

## 2017-10-29 PROCEDURE — 96361 HYDRATE IV INFUSION ADD-ON: CPT

## 2017-10-29 PROCEDURE — 99285 EMERGENCY DEPT VISIT HI MDM: CPT

## 2017-10-29 PROCEDURE — 81025 URINE PREGNANCY TEST: CPT

## 2017-10-29 PROCEDURE — 99284 EMERGENCY DEPT VISIT MOD MDM: CPT

## 2017-10-29 PROCEDURE — 80053 COMPREHEN METABOLIC PANEL: CPT | Performed by: EMERGENCY MEDICINE

## 2017-10-29 PROCEDURE — 96374 THER/PROPH/DIAG INJ IV PUSH: CPT

## 2017-10-29 RX ORDER — MORPHINE SULFATE 4 MG/ML
4 INJECTION, SOLUTION INTRAMUSCULAR; INTRAVENOUS ONCE
Status: COMPLETED | OUTPATIENT
Start: 2017-10-29 | End: 2017-10-29

## 2017-10-29 NOTE — ED PROVIDER NOTES
Patient Seen in: [facility] In [place]    History   Patient presents with:  Abdomen/Flank Pain (GI/)    Stated Complaint: lower left quadrant pain and back times several days     HPI    Patient presents to the emergency department a [facility]    Family History   Problem Relation Age of Onset   • Cancer Maternal Grandmother      lung, brain   • Cancer Maternal Grandfather    • Stroke Maternal Grandfather    • Lipids Maternal Grandfather    • Diabetes Maternal Grandfather    • Diabetes Patern no rigidity, no rebound and no guarding. Mild left lower quadrant and left flank discomfort to palpation. No peritoneal findings. Musculoskeletal: Normal range of motion. She exhibits no edema or tenderness.    Lymphadenopathy:     She has no cervical stool.  ============================================================  MDM       Patient presents with left lower quadrant pain and negative workup except for stool in the patient's colon.   She states that she has had minimal stool output in the past 24 khang

## 2017-11-02 ENCOUNTER — OFFICE VISIT (OUTPATIENT)
Dept: OBGYN CLINIC | Facility: CLINIC | Age: 42
End: 2017-11-02

## 2017-11-02 VITALS
HEART RATE: 99 BPM | SYSTOLIC BLOOD PRESSURE: 106 MMHG | WEIGHT: 173 LBS | BODY MASS INDEX: 28 KG/M2 | RESPIRATION RATE: 16 BRPM | DIASTOLIC BLOOD PRESSURE: 70 MMHG

## 2017-11-02 DIAGNOSIS — Z30.432 ENCOUNTER FOR IUD REMOVAL: Primary | ICD-10-CM

## 2017-11-02 DIAGNOSIS — N94.6 DYSMENORRHEA: ICD-10-CM

## 2017-11-02 DIAGNOSIS — N92.6 IRREGULAR MENSES: ICD-10-CM

## 2017-11-02 PROBLEM — K59.09 OTHER CONSTIPATION: Status: ACTIVE | Noted: 2017-11-02

## 2017-11-02 PROCEDURE — 99213 OFFICE O/P EST LOW 20 MIN: CPT | Performed by: OBSTETRICS & GYNECOLOGY

## 2017-11-02 RX ORDER — ACETAMINOPHEN AND CODEINE PHOSPHATE 120; 12 MG/5ML; MG/5ML
0.35 SOLUTION ORAL DAILY
Qty: 28 TABLET | Refills: 3 | Status: SHIPPED | OUTPATIENT
Start: 2017-11-02 | End: 2017-11-30

## 2017-11-03 NOTE — PROGRESS NOTES
GYN H&P     2017  9:18 PM    CC: Patient presents with:  IUD: removal   Contraception: pt looking to start other means of BC      HPI: patient is a 46year old  here for Patient presents with:  IUD: removal   Contraception: pt looking to start Procedure: ESOPHAGOGASTRODUODENOSCOPY,                POSSIBLE BIOPSY, POSSIBLE POLYPECTOMY 89258;                 Surgeon: Grazyna Messina MD;  Location:                [facility]  5/16: UPPER GI ENDO NO BARRETTS      Comment: esophageal ulcer; [facility] Paternal Grandfather    • Crohn's Disease Maternal Aunt        Social History  Social History   Marital status:   Spouse name: N/A    Years of education: N/A  Number of children: N/A     Occupational History  None on file     Social History Main Top Discussed estrogen contraindicated. Progesterone options only. Options including nexplanon, depot provera, mini pill.  Pt desires to trial minipill, rx given        Greater than 50% of the session was spent on counseling   I have spent a total of 15 minutes

## 2017-11-15 ENCOUNTER — LAB SERVICES (OUTPATIENT)
Dept: OTHER | Age: 42
End: 2017-11-15

## 2017-11-15 LAB — DEPRECATED S PYO AG THROAT QL EIA: NEGATIVE

## 2017-11-21 ENCOUNTER — PATIENT MESSAGE (OUTPATIENT)
Dept: FAMILY MEDICINE CLINIC | Facility: CLINIC | Age: 42
End: 2017-11-21

## 2017-11-21 NOTE — TELEPHONE ENCOUNTER
Vit B12 and Vit D were checked in 2015 and wnl. Does she still want to get these levels checked? Looks like she is due for annual px and fasting BW. Lets get that scheduled and we could add vitamins levels at that time if needed.

## 2017-11-21 NOTE — TELEPHONE ENCOUNTER
From: [place] [facility]  To: Merlin Gulling, MD  Sent: 11/21/2017 9:26 AM CST  Subject: Non-Urgent Medical Question    I was wondering if I could stop in and have my blood taken and checked for my vitamin B levels and for my vitamin D levels?     It's b

## 2017-11-21 NOTE — TELEPHONE ENCOUNTER
Patient advised and verbalized understanding.   Patient scheduled her annual physical.  Future Appointments  Date Time Provider Venkata Carballoi   12/22/2017 8:00 AM Sergey Truong MD [facility]   2/12/2018 4:00 PM Patricia Laurent MD [facility]

## 2017-11-30 ENCOUNTER — PATIENT MESSAGE (OUTPATIENT)
Dept: OBGYN CLINIC | Facility: CLINIC | Age: 42
End: 2017-11-30

## 2017-11-30 NOTE — TELEPHONE ENCOUNTER
From: Tex Luciano  To: Teresa Jerez MD  Sent: 11/30/2017 9:51 AM CST  Subject: Prescription Question    Buzzy Blonder on the birth control pill you currently have me on. I had IUD, was miserable w cramping & bleeding.  Took it out & started me on J

## 2017-12-01 NOTE — TELEPHONE ENCOUNTER
Edward Jiménez,   Yes the reason we switched you to a progesterone only medication was because there was a concern that you have a history of migraine with aura.  Aura meaning approximately an hour before your migraine you either see bright lights, have bli

## 2017-12-04 RX ORDER — LEVONORGESTREL AND ETHINYL ESTRADIOL 0.1-0.02MG
1 KIT ORAL DAILY
Qty: 3 PACKAGE | Refills: 0 | Status: SHIPPED | OUTPATIENT
Start: 2017-12-04 | End: 2018-02-26

## 2017-12-04 NOTE — TELEPHONE ENCOUNTER
OK noted. Please start pt on lutera x 3 months and have her follow up in office in 3 months.  Please have her contact our office immediately if having and shortness of breath, calf pain, or changes in her migraine

## 2017-12-06 RX ORDER — PROPRANOLOL HYDROCHLORIDE 40 MG/1
TABLET ORAL
Qty: 30 TABLET | Refills: 0 | Status: SHIPPED | OUTPATIENT
Start: 2017-12-06 | End: 2018-01-26

## 2017-12-06 NOTE — TELEPHONE ENCOUNTER
PROPRANOLOL HCL 40 MG Oral Tab 30 tablet 0 10/24/2017           Last labs 10/29/17. Last seen on 10/13/17. /68.   Future Appointments  Date Time Provider Venkata Guillaume   12/7/2017 3:30 PM Sandra Osullivan [facility]   12/22/2017 8

## 2018-01-26 RX ORDER — PROPRANOLOL HYDROCHLORIDE 40 MG/1
TABLET ORAL
Qty: 30 TABLET | Refills: 0 | Status: SHIPPED | OUTPATIENT
Start: 2018-01-26 | End: 2018-03-07

## 2018-01-26 NOTE — TELEPHONE ENCOUNTER
LOV: 10/13/17 for constipation    PROPRANOLOL HCL 40 MG Oral Tab 30 tablet 0 12/6/2017    Sig :  TAKE 1 TABLET BY MOUTH EVERY DAY     Route:   (none)       Future Appointments  Date Time Provider Venkata Guillaume   2/5/2018 4:20 PM Gaye Ruggiero MD [facility]

## 2018-01-29 RX ORDER — SUMATRIPTAN 100 MG/1
100 TABLET, FILM COATED ORAL DAILY PRN
Qty: 9 TABLET | Refills: 0 | Status: SHIPPED
Start: 2018-01-29 | End: 2018-05-02

## 2018-01-29 NOTE — TELEPHONE ENCOUNTER
LOV-10/13/17 constipation,rectal pain  LRF-10/13/17 #9+0  Future Appointments  Date Time Provider Venkata Guillaume   2/5/2018 4:20 PM Chandan Briseno MD [facility] [place] [place]

## 2018-01-29 NOTE — TELEPHONE ENCOUNTER
From: Akilah Gillis  Sent: 1/29/2018 12:12 PM CST  Subject: Medication Renewal Request    Nathaly Koenig.  Caron  would like a refill of the following medications:     SUMAtriptan Succinate (IMITREX) 100 MG Oral Tab Wilver Van MD]    Preferred pha

## 2018-02-26 ENCOUNTER — OFFICE VISIT (OUTPATIENT)
Dept: FAMILY MEDICINE CLINIC | Facility: CLINIC | Age: 43
End: 2018-02-26

## 2018-02-26 VITALS
WEIGHT: 180 LBS | OXYGEN SATURATION: 97 % | HEIGHT: 66 IN | RESPIRATION RATE: 20 BRPM | SYSTOLIC BLOOD PRESSURE: 92 MMHG | DIASTOLIC BLOOD PRESSURE: 60 MMHG | BODY MASS INDEX: 28.93 KG/M2 | TEMPERATURE: 98 F | HEART RATE: 75 BPM

## 2018-02-26 DIAGNOSIS — Z00.00 ANNUAL PHYSICAL EXAM: ICD-10-CM

## 2018-02-26 DIAGNOSIS — S63.681A OTHER SPRAIN OF RIGHT THUMB, INITIAL ENCOUNTER: ICD-10-CM

## 2018-02-26 DIAGNOSIS — Z12.31 SCREENING MAMMOGRAM, ENCOUNTER FOR: Primary | ICD-10-CM

## 2018-02-26 DIAGNOSIS — Z23 NEED FOR DIPHTHERIA-TETANUS-PERTUSSIS (TDAP) VACCINE: ICD-10-CM

## 2018-02-26 PROCEDURE — 90715 TDAP VACCINE 7 YRS/> IM: CPT | Performed by: FAMILY MEDICINE

## 2018-02-26 PROCEDURE — 99396 PREV VISIT EST AGE 40-64: CPT | Performed by: FAMILY MEDICINE

## 2018-02-26 PROCEDURE — 90471 IMMUNIZATION ADMIN: CPT | Performed by: FAMILY MEDICINE

## 2018-02-26 RX ORDER — LEVONORGESTREL AND ETHINYL ESTRADIOL 0.1-0.02MG
KIT ORAL
COMMUNITY
Start: 2017-12-03 | End: 2018-03-02

## 2018-02-26 NOTE — PROGRESS NOTES
HPI:   Johnny Lane is a 38year old female who presents for a complete physical exam. Has gyne. UTD on pap.   Never had mammogram.    Right thumb pain  Base on thumb  Injured  - can't recall how  Aggravated by: opening jar  Onset: 7-10 days  No sw Rfl: 0   ALPRAZOLAM 0.25 MG Oral Tab TAKE 1 TABLET BY MOUTH EVERY DAY AS NEEDED FOR ANXIETY Disp: 15 tablet Rfl: 0      Past Medical History:   Diagnosis Date   • Abdominal distention    • Abdominal pain    • Anxiety state, unspecified    • Back pain    • Grandmother    • Depression Son    • Other Elza Spatz Son      Duchennes muscular dystrophy   • Diabetes Paternal Grandfather    • Crohn's Disease Maternal Aunt       Social History:   Smoking status: Former Smoker tenderness over abductor policis brevis  NEURO: Oriented times three,cranial nerves are intact,motor and sensory are grossly intact  PSYCH: mood, thought, behavior and judgment wnl    ASSESSMENT AND PLAN:   Clyde Pennington is a 38year old female who

## 2018-02-27 ENCOUNTER — NURSE ONLY (OUTPATIENT)
Dept: FAMILY MEDICINE CLINIC | Facility: CLINIC | Age: 43
End: 2018-02-27

## 2018-02-27 DIAGNOSIS — Z00.00 ANNUAL PHYSICAL EXAM: ICD-10-CM

## 2018-02-27 DIAGNOSIS — Z00.00 GENERAL MEDICAL EXAM: Primary | ICD-10-CM

## 2018-02-27 LAB
25-HYDROXYVITAMIN D (TOTAL): 15.8 NG/ML (ref 30–100)
ALBUMIN SERPL-MCNC: 3.8 G/DL (ref 3.5–4.8)
ALP LIVER SERPL-CCNC: 68 U/L (ref 37–98)
ALT SERPL-CCNC: 17 U/L (ref 14–54)
AST SERPL-CCNC: 16 U/L (ref 15–41)
BASOPHILS # BLD AUTO: 0.02 X10(3) UL (ref 0–0.1)
BASOPHILS NFR BLD AUTO: 0.2 %
BILIRUB SERPL-MCNC: 0.4 MG/DL (ref 0.1–2)
BUN BLD-MCNC: 14 MG/DL (ref 8–20)
CALCIUM BLD-MCNC: 9.3 MG/DL (ref 8.3–10.3)
CHLORIDE: 104 MMOL/L (ref 101–111)
CHOLEST SMN-MCNC: 175 MG/DL (ref ?–200)
CO2: 27 MMOL/L (ref 22–32)
CREAT BLD-MCNC: 0.9 MG/DL (ref 0.55–1.02)
EOSINOPHIL # BLD AUTO: 0.11 X10(3) UL (ref 0–0.3)
EOSINOPHIL NFR BLD AUTO: 1.3 %
ERYTHROCYTE [DISTWIDTH] IN BLOOD BY AUTOMATED COUNT: 14.4 % (ref 11.5–16)
EST. AVERAGE GLUCOSE BLD GHB EST-MCNC: 100 MG/DL (ref 68–126)
GLUCOSE BLD-MCNC: 85 MG/DL (ref 70–99)
HBA1C MFR BLD HPLC: 5.1 % (ref ?–5.7)
HCT VFR BLD AUTO: 41.5 % (ref 34–50)
HDLC SERPL-MCNC: 31 MG/DL (ref 45–?)
HDLC SERPL: 5.65 {RATIO} (ref ?–4.44)
HGB BLD-MCNC: 13.3 G/DL (ref 12–16)
IMMATURE GRANULOCYTE COUNT: 0.01 X10(3) UL (ref 0–1)
IMMATURE GRANULOCYTE RATIO %: 0.1 %
LDLC SERPL CALC-MCNC: 82 MG/DL (ref ?–130)
LYMPHOCYTES # BLD AUTO: 1.63 X10(3) UL (ref 0.9–4)
LYMPHOCYTES NFR BLD AUTO: 19.8 %
M PROTEIN MFR SERPL ELPH: 7.8 G/DL (ref 6.1–8.3)
MCH RBC QN AUTO: 27.9 PG (ref 27–33.2)
MCHC RBC AUTO-ENTMCNC: 32 G/DL (ref 31–37)
MCV RBC AUTO: 87 FL (ref 81–100)
MONOCYTES # BLD AUTO: 0.46 X10(3) UL (ref 0.1–1)
MONOCYTES NFR BLD AUTO: 5.6 %
NEUTROPHIL ABS PRELIM: 6.02 X10 (3) UL (ref 1.3–6.7)
NEUTROPHILS # BLD AUTO: 6.02 X10(3) UL (ref 1.3–6.7)
NEUTROPHILS NFR BLD AUTO: 73 %
NONHDLC SERPL-MCNC: 144 MG/DL (ref ?–130)
PLATELET # BLD AUTO: 288 10(3)UL (ref 150–450)
POTASSIUM SERPL-SCNC: 4.2 MMOL/L (ref 3.6–5.1)
RBC # BLD AUTO: 4.77 X10(6)UL (ref 3.8–5.1)
RED CELL DISTRIBUTION WIDTH-SD: 45.4 FL (ref 35.1–46.3)
SODIUM SERPL-SCNC: 139 MMOL/L (ref 136–144)
TRIGL SERPL-MCNC: 309 MG/DL (ref ?–150)
TSI SER-ACNC: 1.11 MIU/ML (ref 0.35–5.5)
VLDLC SERPL CALC-MCNC: 62 MG/DL (ref 5–40)
WBC # BLD AUTO: 8.3 X10(3) UL (ref 4–13)

## 2018-02-27 PROCEDURE — 84443 ASSAY THYROID STIM HORMONE: CPT | Performed by: FAMILY MEDICINE

## 2018-02-27 PROCEDURE — 83036 HEMOGLOBIN GLYCOSYLATED A1C: CPT | Performed by: FAMILY MEDICINE

## 2018-02-27 PROCEDURE — 85025 COMPLETE CBC W/AUTO DIFF WBC: CPT | Performed by: FAMILY MEDICINE

## 2018-02-27 PROCEDURE — 80061 LIPID PANEL: CPT | Performed by: FAMILY MEDICINE

## 2018-02-27 PROCEDURE — 80053 COMPREHEN METABOLIC PANEL: CPT | Performed by: FAMILY MEDICINE

## 2018-02-27 PROCEDURE — 82306 VITAMIN D 25 HYDROXY: CPT | Performed by: FAMILY MEDICINE

## 2018-02-27 PROCEDURE — 36415 COLL VENOUS BLD VENIPUNCTURE: CPT | Performed by: FAMILY MEDICINE

## 2018-03-05 ENCOUNTER — PATIENT MESSAGE (OUTPATIENT)
Dept: FAMILY MEDICINE CLINIC | Facility: CLINIC | Age: 43
End: 2018-03-05

## 2018-03-05 RX ORDER — VENLAFAXINE HYDROCHLORIDE 150 MG/1
150 CAPSULE, EXTENDED RELEASE ORAL DAILY
Qty: 35 CAPSULE | Refills: 0 | Status: CANCELLED | OUTPATIENT
Start: 2018-03-05

## 2018-03-05 NOTE — TELEPHONE ENCOUNTER
From: Jovani Plaza  To:  Azeem Wild MD  Sent: 3/5/2018 1:17 PM CST  Subject: Prescription Question    Sent an email just a moment ago, forgot to put the medication I need a refill on:  Venlafaxine  Thanks

## 2018-03-05 NOTE — TELEPHONE ENCOUNTER
They should be able to give you a bridging rx until you are seen. Please have pt contact them. I have left message with Funmi Parks's office to get more info.     Future Appointments  Date Time Provider Venkata Guillaume   3/13/2018 3:40 PM Maricruz Aleman

## 2018-03-05 NOTE — TELEPHONE ENCOUNTER
Patient needs refill on venlafaxine  Medication pended. Please advise. Venlafaxine HCl  MG Oral Capsule SR 24 Hr  2 9/5/2017    Sig :  Take 1 capsule by mouth daily.      Route:   Oral

## 2018-03-05 NOTE — TELEPHONE ENCOUNTER
From: Hayden Contreras  To: Clemencia Bravo MD  Sent: 3/5/2018 1:11 PM CST  Subject: Prescription Question    Dr. Abbe Fofana,  I have contacted Janina Montes who is the psychiatrist that I was seeing for my medications.  I was not able to get in to see her r

## 2018-03-06 ENCOUNTER — TELEPHONE (OUTPATIENT)
Dept: FAMILY MEDICINE CLINIC | Facility: CLINIC | Age: 43
End: 2018-03-06

## 2018-03-06 NOTE — TELEPHONE ENCOUNTER
Patient saw half of lab results on mychart. Patient looking for lipid and vit d results. Please advise.

## 2018-03-06 NOTE — TELEPHONE ENCOUNTER
Spoke with Hannah Torres RN. She will contact [facility] about refilling her rx. Please notify pt and have her contact their office if she doesn't hear back from them today.

## 2018-03-07 RX ORDER — ERGOCALCIFEROL 1.25 MG/1
50000 CAPSULE ORAL WEEKLY
Qty: 12 CAPSULE | Refills: 0 | Status: SHIPPED | OUTPATIENT
Start: 2018-03-07 | End: 2018-05-24

## 2018-03-07 RX ORDER — PROPRANOLOL HYDROCHLORIDE 40 MG/1
TABLET ORAL
Qty: 30 TABLET | Refills: 5 | Status: SHIPPED | OUTPATIENT
Start: 2018-03-07 | End: 2018-11-12

## 2018-03-07 NOTE — TELEPHONE ENCOUNTER
PROPRANOLOL HCL 40 MG Oral Tab 30 tablet 0 1/26/2018     Last labs 02/27/18. Last seen on 02/26/18. cc: annual & pain in thumb. BP 92/60.   Future Appointments  Date Time Provider Venkata Aundrea   3/12/2018 4:40 PM Loida Marques [place] [facility] [place] [facility] [place]

## 2018-03-07 NOTE — TELEPHONE ENCOUNTER
Tchol and LDL are nl  Trig is high at 309  HDL, good chol, is low at 31    Rec low fat diet to try to bring down on own. Rec starting omega 3 supplement daily to raise HDL level. Vit D is low at 15.8  Start rx vit D 69839h PO weekly for 12 weeks.     A

## 2018-04-18 RX ORDER — LEVONORGESTREL AND ETHINYL ESTRADIOL 0.1-0.02MG
1 KIT ORAL DAILY
Qty: 1 PACKAGE | Refills: 0 | Status: SHIPPED
Start: 2018-04-18 | End: 2018-04-26

## 2018-04-18 NOTE — TELEPHONE ENCOUNTER
From: Richi Gillis  Sent: 4/18/2018 12:41 PM CDT  Subject: Medication Renewal Request    Zahraa Cantor.  Luisa Cabezas would like a refill of the following medications:     Levonorgestrel-Ethinyl Estrad (AVIANE) 0.1-20 MG-MCG Oral Tab NANCI Ybarra

## 2018-04-26 ENCOUNTER — OFFICE VISIT (OUTPATIENT)
Dept: OBGYN CLINIC | Facility: CLINIC | Age: 43
End: 2018-04-26

## 2018-04-26 VITALS
BODY MASS INDEX: 28.13 KG/M2 | SYSTOLIC BLOOD PRESSURE: 110 MMHG | WEIGHT: 175 LBS | DIASTOLIC BLOOD PRESSURE: 70 MMHG | HEIGHT: 66 IN

## 2018-04-26 DIAGNOSIS — N39.3 STRESS INCONTINENCE: ICD-10-CM

## 2018-04-26 DIAGNOSIS — Z01.419 WELL WOMAN EXAM: Primary | ICD-10-CM

## 2018-04-26 DIAGNOSIS — Z12.39 BREAST CANCER SCREENING: ICD-10-CM

## 2018-04-26 DIAGNOSIS — Z12.4 CERVICAL CANCER SCREENING: ICD-10-CM

## 2018-04-26 PROCEDURE — 99396 PREV VISIT EST AGE 40-64: CPT | Performed by: OBSTETRICS & GYNECOLOGY

## 2018-04-26 PROCEDURE — 88175 CYTOPATH C/V AUTO FLUID REDO: CPT | Performed by: OBSTETRICS & GYNECOLOGY

## 2018-04-26 PROCEDURE — 87624 HPV HI-RISK TYP POOLED RSLT: CPT | Performed by: OBSTETRICS & GYNECOLOGY

## 2018-04-26 RX ORDER — LEVONORGESTREL AND ETHINYL ESTRADIOL 0.1-0.02MG
1 KIT ORAL DAILY
Qty: 1 PACKAGE | Refills: 11 | Status: SHIPPED | OUTPATIENT
Start: 2018-04-26 | End: 2019-04-11

## 2018-04-26 NOTE — PROGRESS NOTES
GYN H&P     2018  5:27 PM    CC: Patient presents with:  Physical: Pt is concerned about occasional incontinence, Pt has a mammogram scheduled next week      HPI: patient is a 38year old  here for her annual gyne exam.      Menses are reg on o ENDOSCOPY,BALL DIL,30MM N/A      Comment: Procedure: ESOPHAGOGASTRODUODENOSCOPY,                POSSIBLE BIOPSY, POSSIBLE POLYPECTOMY 52874;                 Surgeon: Maurilio Mann MD;  Location:                [facility]  5/16: UPPER GI ENDO NO B Maternal Grandfather    • Diabetes Maternal Grandfather    • Heart Attack Maternal Grandfather    • Diabetes Paternal Grandmother    • Depression Paternal Grandmother    • Depression Son    • Other [OTHER] Son      Duchennes muscular dystrophy   • Diabetes distress  SKIN: no rashes, no suspicious lesions  HEENT: normal  NECK: no thyroidmegaly, no adenopathy  LUNGS: clear to auscultation  CARDIOVASCULAR: normal S1, S2, RRR  BREASTS: soft, nontendder, no palpable masses or nodes, no nipple discharge, no skin c

## 2018-04-30 ENCOUNTER — OFFICE VISIT (OUTPATIENT)
Dept: FAMILY MEDICINE CLINIC | Facility: CLINIC | Age: 43
End: 2018-04-30

## 2018-04-30 VITALS
RESPIRATION RATE: 20 BRPM | BODY MASS INDEX: 28.77 KG/M2 | HEIGHT: 66 IN | TEMPERATURE: 98 F | OXYGEN SATURATION: 99 % | SYSTOLIC BLOOD PRESSURE: 92 MMHG | HEART RATE: 74 BPM | DIASTOLIC BLOOD PRESSURE: 64 MMHG | WEIGHT: 179 LBS

## 2018-04-30 DIAGNOSIS — K44.9 HIATAL HERNIA: ICD-10-CM

## 2018-04-30 DIAGNOSIS — R07.9 LEFT SIDED CHEST PAIN: Primary | ICD-10-CM

## 2018-04-30 PROCEDURE — 99213 OFFICE O/P EST LOW 20 MIN: CPT | Performed by: FAMILY MEDICINE

## 2018-04-30 RX ORDER — ERGOCALCIFEROL 1.25 MG/1
50000 CAPSULE ORAL WEEKLY
Qty: 4 CAPSULE | Refills: 0 | Status: SHIPPED | OUTPATIENT
Start: 2018-04-30 | End: 2018-05-07

## 2018-04-30 NOTE — PROGRESS NOTES
HPI:    Patient ID: Aric Astudillo is a 38year old female. Patient presents with:  Chest Pain: left sided, sharp,     HPI  Left sided chest pain. Lower chest, above diapjragm. Sharp pains. Lasts for few seconds.  Resolves after taking few deep deana to take it    HISTORY:  Past Medical History:   Diagnosis Date   • Abdominal distention    • Abdominal pain    • Anxiety state, unspecified    • Back pain    • Bloating    • Constipation    • Depression    • Dysmenorrhea    • Esophageal reflux    • [place] Paternal Grandfather    • Crohn's Disease Maternal Aunt       Social History: Smoking status: Former Smoker                                                              Packs/day: 1.00      Years: 18.00        Quit date: 1/23/2008  Smokeless tobacco: Never

## 2018-05-02 NOTE — TELEPHONE ENCOUNTER
SUMAtriptan Succinate (IMITREX) 100 MG Oral Tab 9 tablet 0 1/29/2018     Last labs 02/27/18. Last seen on 04/30/18. BP 92/64.   Future Appointments  Date Time Provider Venkata Guillaume   5/14/2018 3:40 PM JESSICA Isaac [facility] [place]   5/15/201

## 2018-05-04 RX ORDER — SUMATRIPTAN 100 MG/1
TABLET, FILM COATED ORAL
Qty: 9 TABLET | Refills: 0 | Status: SHIPPED | OUTPATIENT
Start: 2018-05-04 | End: 2019-03-21

## 2018-05-07 ENCOUNTER — OFFICE VISIT (OUTPATIENT)
Dept: FAMILY MEDICINE CLINIC | Facility: CLINIC | Age: 43
End: 2018-05-07

## 2018-05-07 VITALS
TEMPERATURE: 98 F | SYSTOLIC BLOOD PRESSURE: 122 MMHG | BODY MASS INDEX: 29.09 KG/M2 | WEIGHT: 181 LBS | RESPIRATION RATE: 18 BRPM | HEART RATE: 93 BPM | DIASTOLIC BLOOD PRESSURE: 80 MMHG | HEIGHT: 66 IN

## 2018-05-07 DIAGNOSIS — S39.012A STRAIN OF LUMBAR REGION, INITIAL ENCOUNTER: Primary | ICD-10-CM

## 2018-05-07 PROCEDURE — 99214 OFFICE O/P EST MOD 30 MIN: CPT | Performed by: FAMILY MEDICINE

## 2018-05-07 RX ORDER — NAPROXEN 500 MG/1
500 TABLET ORAL 2 TIMES DAILY PRN
Qty: 20 TABLET | Refills: 0 | Status: SHIPPED | OUTPATIENT
Start: 2018-05-07 | End: 2018-11-05

## 2018-05-07 RX ORDER — CYCLOBENZAPRINE HCL 10 MG
10 TABLET ORAL 3 TIMES DAILY PRN
Qty: 20 TABLET | Refills: 0 | Status: SHIPPED | OUTPATIENT
Start: 2018-05-07 | End: 2018-11-05

## 2018-05-07 NOTE — PROGRESS NOTES
HPI:    Patient ID: Ursula Hooper is a 38year old female. Patient presents with:  Low Back Pain    HPI  Low back pain for 3 days. Unchanged. Started after lifting 125 pound handicapped son into tub. Right sided. +spasms. Non-radiating.   Rozina Goyal Abdominal pain    • Anxiety state, unspecified    • Back pain    • Bloating    • Constipation    • Depression    • Dysmenorrhea    • Esophageal reflux    • Frequent use of laxatives    • History of depression    • History of stomach ulcers    • Irregular b Former Smoker                                                              Packs/day: 1.00      Years: 18.00        Quit date: 1/23/2008  Smokeless tobacco: Never Used                      Alcohol use: Yes           0.0 oz/week     Comment: socially Referrals:  None        VO#[id number]

## 2018-05-15 ENCOUNTER — HOSPITAL ENCOUNTER (OUTPATIENT)
Dept: MAMMOGRAPHY | Age: 43
Discharge: HOME | End: 2018-05-15
Attending: FAMILY MEDICINE
Payer: COMMERCIAL

## 2018-05-15 DIAGNOSIS — Z12.31 SCREENING MAMMOGRAM, ENCOUNTER FOR: ICD-10-CM

## 2018-05-15 PROCEDURE — 77067 SCR MAMMO BI INCL CAD: CPT | Performed by: FAMILY MEDICINE

## 2018-05-15 PROCEDURE — 77063 BREAST TOMOSYNTHESIS BI: CPT | Performed by: FAMILY MEDICINE

## 2018-06-04 ENCOUNTER — HOSPITAL ENCOUNTER (OUTPATIENT)
Dept: ULTRASOUND IMAGING | Age: 43
Discharge: HOME | End: 2018-06-04
Attending: FAMILY MEDICINE
Payer: COMMERCIAL

## 2018-06-04 ENCOUNTER — HOSPITAL ENCOUNTER (OUTPATIENT)
Dept: MAMMOGRAPHY | Age: 43
Discharge: HOME | End: 2018-06-04
Attending: FAMILY MEDICINE
Payer: COMMERCIAL

## 2018-06-04 DIAGNOSIS — R92.2 INCONCLUSIVE MAMMOGRAM: ICD-10-CM

## 2018-06-04 PROCEDURE — 77066 DX MAMMO INCL CAD BI: CPT | Performed by: FAMILY MEDICINE

## 2018-06-04 PROCEDURE — 77062 BREAST TOMOSYNTHESIS BI: CPT | Performed by: FAMILY MEDICINE

## 2018-06-04 PROCEDURE — 76642 ULTRASOUND BREAST LIMITED: CPT | Performed by: FAMILY MEDICINE

## 2018-08-27 ENCOUNTER — TELEPHONE (OUTPATIENT)
Dept: FAMILY MEDICINE CLINIC | Facility: CLINIC | Age: 43
End: 2018-08-27

## 2018-08-27 NOTE — TELEPHONE ENCOUNTER
Call from patient. Patient was bitten by mosquitos on Saturday night. Stated the bites are all over legs, ankles and feet and itch terribly. Not warm to touch or swollen, \"they look like regular mosquito bites\".  Has been using benadryl cream, spray and t

## 2018-08-27 NOTE — TELEPHONE ENCOUNTER
Pt got bit by many mosquitoes on ankle area this weekend. Looking for script to help her with itching. Nothing OTC is relieving her.

## 2018-09-27 ENCOUNTER — TELEPHONE (OUTPATIENT)
Dept: FAMILY MEDICINE CLINIC | Facility: CLINIC | Age: 43
End: 2018-09-27

## 2018-09-27 NOTE — TELEPHONE ENCOUNTER
Pt had scheduled an apt for today for an abscess on her stomach that is painful and red and had to cancel due to work. She stated it started to drain on its own but not all the way.

## 2018-09-27 NOTE — TELEPHONE ENCOUNTER
Patient advised and verbalized understanding. Appointment scheduled.   Future Appointments   Date Time Provider Venkata Guillaume   9/29/2018  8:00 AM Farheen Morales MD [facility]   10/11/2018  3:40 PM JESSICA Slater

## 2018-10-19 ENCOUNTER — CHARTING TRANS (OUTPATIENT)
Dept: OTHER | Age: 43
End: 2018-10-19

## 2018-11-05 ENCOUNTER — HOSPITAL ENCOUNTER (OUTPATIENT)
Dept: GENERAL RADIOLOGY | Age: 43
Discharge: HOME | End: 2018-11-05
Attending: FAMILY MEDICINE
Payer: COMMERCIAL

## 2018-11-05 ENCOUNTER — OFFICE VISIT (OUTPATIENT)
Dept: FAMILY MEDICINE CLINIC | Facility: CLINIC | Age: 43
End: 2018-11-05
Payer: COMMERCIAL

## 2018-11-05 VITALS
TEMPERATURE: 98 F | OXYGEN SATURATION: 99 % | HEART RATE: 71 BPM | WEIGHT: 184 LBS | SYSTOLIC BLOOD PRESSURE: 108 MMHG | BODY MASS INDEX: 29.57 KG/M2 | RESPIRATION RATE: 18 BRPM | DIASTOLIC BLOOD PRESSURE: 62 MMHG | HEIGHT: 66 IN

## 2018-11-05 DIAGNOSIS — M25.561 RIGHT KNEE PAIN, UNSPECIFIED CHRONICITY: ICD-10-CM

## 2018-11-05 DIAGNOSIS — M25.561 RIGHT KNEE PAIN, UNSPECIFIED CHRONICITY: Primary | ICD-10-CM

## 2018-11-05 PROCEDURE — 73560 X-RAY EXAM OF KNEE 1 OR 2: CPT | Performed by: FAMILY MEDICINE

## 2018-11-05 PROCEDURE — 99214 OFFICE O/P EST MOD 30 MIN: CPT | Performed by: FAMILY MEDICINE

## 2018-11-05 NOTE — PROGRESS NOTES
HPI:    Patient ID: Brittney Flanagan is a 38year old female. Patient presents with:  Knee Pain    HPI  Right knee pain  Onset: 6 months. Worse past 6 weeks  Described as \" bones rubbing together\"  Painful to kneel on it and going up stairs.  Has gi menstrual period 11/01/2018, SpO2 99 %, not currently breastfeeding. ASSESSMENT/PLAN:   Right knee pain, unspecified chronicity  (primary encounter diagnosis)  Right knee xray today.  ANNIKA James prn pain - using sparingly due to GI s/e  PT ordere

## 2018-11-12 RX ORDER — PROPRANOLOL HYDROCHLORIDE 40 MG/1
TABLET ORAL
Qty: 30 TABLET | Refills: 5 | Status: SHIPPED | OUTPATIENT
Start: 2018-11-12 | End: 2019-06-26

## 2018-11-12 NOTE — TELEPHONE ENCOUNTER
LOV: 11/5/18 for right knee pain    PROPRANOLOL HCL 40 MG Oral Tab 30 tablet 5 3/7/2018    Sig :  TAKE 1 TABLET BY MOUTH EVERY DAY     Route:   (none)       Future Appointments   Date Time Provider Venkata Guillaume   1/24/2019  3:40 PM JESSICA Eduardo

## 2018-12-12 ENCOUNTER — OFFICE VISIT (OUTPATIENT)
Dept: FAMILY MEDICINE CLINIC | Facility: CLINIC | Age: 43
End: 2018-12-12
Payer: COMMERCIAL

## 2018-12-12 ENCOUNTER — HOSPITAL ENCOUNTER (OUTPATIENT)
Dept: GENERAL RADIOLOGY | Age: 43
Discharge: HOME | End: 2018-12-12
Attending: FAMILY MEDICINE
Payer: COMMERCIAL

## 2018-12-12 VITALS
HEART RATE: 77 BPM | TEMPERATURE: 98 F | DIASTOLIC BLOOD PRESSURE: 72 MMHG | WEIGHT: 185 LBS | HEIGHT: 66 IN | BODY MASS INDEX: 29.73 KG/M2 | OXYGEN SATURATION: 98 % | RESPIRATION RATE: 16 BRPM | SYSTOLIC BLOOD PRESSURE: 114 MMHG

## 2018-12-12 DIAGNOSIS — M25.562 ACUTE PAIN OF LEFT KNEE: Primary | ICD-10-CM

## 2018-12-12 DIAGNOSIS — M25.562 ACUTE PAIN OF LEFT KNEE: ICD-10-CM

## 2018-12-12 PROCEDURE — 73560 X-RAY EXAM OF KNEE 1 OR 2: CPT | Performed by: FAMILY MEDICINE

## 2018-12-12 PROCEDURE — 99213 OFFICE O/P EST LOW 20 MIN: CPT | Performed by: FAMILY MEDICINE

## 2018-12-12 NOTE — PROGRESS NOTES
HPI:    Patient ID: Ursula Hooper is a 38year old female. Patient presents with:  Knee Pain    HPI   Left knee pain after fall. Slipped and fell onto left side yesterday while walking dog off a ramp in her home.  Left knee swelling up - better to #[id number]

## 2019-01-08 ENCOUNTER — OFFICE VISIT (OUTPATIENT)
Dept: FAMILY MEDICINE CLINIC | Facility: CLINIC | Age: 44
End: 2019-01-08
Payer: COMMERCIAL

## 2019-01-08 VITALS
BODY MASS INDEX: 30.53 KG/M2 | HEART RATE: 67 BPM | RESPIRATION RATE: 18 BRPM | TEMPERATURE: 99 F | DIASTOLIC BLOOD PRESSURE: 82 MMHG | OXYGEN SATURATION: 98 % | WEIGHT: 190 LBS | HEIGHT: 66 IN | SYSTOLIC BLOOD PRESSURE: 124 MMHG

## 2019-01-08 DIAGNOSIS — M25.561 PAIN IN BOTH KNEES, UNSPECIFIED CHRONICITY: Primary | ICD-10-CM

## 2019-01-08 DIAGNOSIS — M25.562 PAIN IN BOTH KNEES, UNSPECIFIED CHRONICITY: Primary | ICD-10-CM

## 2019-01-08 PROCEDURE — 99213 OFFICE O/P EST LOW 20 MIN: CPT | Performed by: FAMILY MEDICINE

## 2019-01-08 RX ORDER — VENLAFAXINE HYDROCHLORIDE 150 MG/1
CAPSULE, EXTENDED RELEASE ORAL
COMMUNITY
Start: 2019-01-07 | End: 2019-01-10

## 2019-01-08 RX ORDER — VENLAFAXINE HYDROCHLORIDE 75 MG/1
CAPSULE, EXTENDED RELEASE ORAL
COMMUNITY
Start: 2019-01-07 | End: 2019-01-10

## 2019-01-08 NOTE — PROGRESS NOTES
HPI:    Patient ID: Jil Truong is a 41year old female. Patient presents with:  Knee Pain: in both knees x 2 months.(unable to kneel ) , per pt    HPI:  Pain in both knees to kneel. Knee cap feels sentsiive.   Onset: approx 2 months  Pain she f temperature source Temporal, resp. rate 18, height 66\", weight 190 lb, last menstrual period 12/25/2018, SpO2 98 %, not currently breastfeeding.                ASSESSMENT/PLAN:   Pain in both knees, unspecified chronicity  (primary encounter diagnosis)  Re

## 2019-03-04 ENCOUNTER — OFFICE VISIT (OUTPATIENT)
Dept: FAMILY MEDICINE CLINIC | Facility: CLINIC | Age: 44
End: 2019-03-04
Payer: COMMERCIAL

## 2019-03-04 VITALS
SYSTOLIC BLOOD PRESSURE: 112 MMHG | DIASTOLIC BLOOD PRESSURE: 76 MMHG | RESPIRATION RATE: 20 BRPM | BODY MASS INDEX: 29.89 KG/M2 | WEIGHT: 186 LBS | HEIGHT: 66 IN | TEMPERATURE: 98 F | OXYGEN SATURATION: 99 % | HEART RATE: 70 BPM

## 2019-03-04 DIAGNOSIS — S16.1XXA STRAIN OF NECK MUSCLE, INITIAL ENCOUNTER: Primary | ICD-10-CM

## 2019-03-04 DIAGNOSIS — E55.9 VITAMIN D DEFICIENCY: ICD-10-CM

## 2019-03-04 LAB — VIT D+METAB SERPL-MCNC: 20.1 NG/ML (ref 30–100)

## 2019-03-04 PROCEDURE — 36415 COLL VENOUS BLD VENIPUNCTURE: CPT | Performed by: FAMILY MEDICINE

## 2019-03-04 PROCEDURE — 82306 VITAMIN D 25 HYDROXY: CPT | Performed by: FAMILY MEDICINE

## 2019-03-04 PROCEDURE — 99213 OFFICE O/P EST LOW 20 MIN: CPT | Performed by: FAMILY MEDICINE

## 2019-03-04 RX ORDER — VENLAFAXINE HYDROCHLORIDE 75 MG/1
75 CAPSULE, EXTENDED RELEASE ORAL DAILY
COMMUNITY
End: 2019-04-24

## 2019-03-04 RX ORDER — GLUCOSAMINE HCL 500 MG
TABLET ORAL
COMMUNITY
End: 2021-09-29

## 2019-03-04 RX ORDER — VENLAFAXINE HYDROCHLORIDE 150 MG/1
CAPSULE, EXTENDED RELEASE ORAL
COMMUNITY
Start: 2018-10-01 | End: 2019-04-24

## 2019-03-05 NOTE — PROGRESS NOTES
HPI:    Patient ID: Dinora Alas is a 41year old female. Patient presents with:  Neck Pain: neck, upper back pain  Lab: requesting vitamin d    HPI  C/O Neck pain. Flare of chronic pain. B/l bases. More on left side today.  Extends into shoulder Bloating    • Constipation    • Depression    • Dysmenorrhea    • Esophageal reflux    • Frequent use of laxatives    • History of depression    • History of stomach ulcers    • Irregular bowel habits    • Loss of appetite    • Migraine    • Migraines    • Packs/day: 1.00        Years: 18.00        Pack years: 25        Quit date: 2008        Years since quittin.1      Smokeless tobacco: Never Used      Tobacco comment: non-smoker    Alcohol use:  Yes      Alcohol/week: 0.0 oz      Frequency: 4 or m Visit:  Requested Prescriptions      No prescriptions requested or ordered in this encounter       Imaging & Referrals:  None        MN#[id number]

## 2019-03-09 RX ORDER — ERGOCALCIFEROL 1.25 MG/1
50000 CAPSULE ORAL WEEKLY
Qty: 4 CAPSULE | Refills: 0 | Status: SHIPPED | OUTPATIENT
Start: 2019-03-09 | End: 2019-04-08

## 2019-03-21 RX ORDER — SUMATRIPTAN 100 MG/1
TABLET, FILM COATED ORAL
Qty: 9 TABLET | Refills: 0 | Status: SHIPPED | OUTPATIENT
Start: 2019-03-21 | End: 2020-08-01

## 2019-03-21 NOTE — TELEPHONE ENCOUNTER
LOV: 3/4/19 for strain of neck    SUMATRIPTAN SUCCINATE 100 MG Oral Tab 9 tablet 0 5/4/2018    Sig :  TAKE 1 TABLET BY MOUTH DAILY AS NEEDED FOR MIGRAINE; TO TAKE WITHIN 30 MINUTES AT ONSET OF HEADACHE     Route:   (none)       Future Appointments   Date T

## 2019-04-10 ENCOUNTER — TELEPHONE (OUTPATIENT)
Dept: OBGYN CLINIC | Facility: CLINIC | Age: 44
End: 2019-04-10

## 2019-04-10 NOTE — TELEPHONE ENCOUNTER
Last OV: 4/26/18 with Dr. Kurt Khan for annual  Last refill date: 4/26/18  Follow-up: 1 year  Next appt.: none scheduled; due for appt    Patient is due for annual. Please contact her to schedule appt and then return to RN pool for refill.  Thank you

## 2019-04-11 RX ORDER — LEVONORGESTREL AND ETHINYL ESTRADIOL 0.1-0.02MG
1 KIT ORAL DAILY
Qty: 1 PACKAGE | Refills: 1 | Status: SHIPPED | OUTPATIENT
Start: 2019-04-11 | End: 2019-06-18

## 2019-04-11 NOTE — TELEPHONE ENCOUNTER
Please refill BC until appt   Future Appointments   Date Time Provider Venkata Guillaume   4/23/2019  1:00 PM [facility]   6/3/2019  5:00 PM Tameka Nuno MD [facility]N [facility] [place]

## 2019-04-17 ENCOUNTER — TELEPHONE (OUTPATIENT)
Dept: FAMILY MEDICINE CLINIC | Facility: CLINIC | Age: 44
End: 2019-04-17

## 2019-04-17 DIAGNOSIS — E55.9 VITAMIN D DEFICIENCY: ICD-10-CM

## 2019-04-17 DIAGNOSIS — Z00.00 GENERAL MEDICAL EXAM: Primary | ICD-10-CM

## 2019-04-20 ENCOUNTER — NURSE ONLY (OUTPATIENT)
Dept: FAMILY MEDICINE CLINIC | Facility: CLINIC | Age: 44
End: 2019-04-20
Payer: COMMERCIAL

## 2019-04-20 DIAGNOSIS — E55.9 VITAMIN D DEFICIENCY: ICD-10-CM

## 2019-04-20 DIAGNOSIS — Z00.00 GENERAL MEDICAL EXAM: Primary | ICD-10-CM

## 2019-04-20 PROCEDURE — 80050 GENERAL HEALTH PANEL: CPT | Performed by: FAMILY MEDICINE

## 2019-04-20 PROCEDURE — 36415 COLL VENOUS BLD VENIPUNCTURE: CPT | Performed by: FAMILY MEDICINE

## 2019-04-20 PROCEDURE — 80061 LIPID PANEL: CPT | Performed by: FAMILY MEDICINE

## 2019-04-20 PROCEDURE — 82306 VITAMIN D 25 HYDROXY: CPT | Performed by: FAMILY MEDICINE

## 2019-04-22 ENCOUNTER — OFFICE VISIT (OUTPATIENT)
Dept: FAMILY MEDICINE CLINIC | Facility: CLINIC | Age: 44
End: 2019-04-22
Payer: COMMERCIAL

## 2019-04-22 VITALS
RESPIRATION RATE: 20 BRPM | HEART RATE: 66 BPM | TEMPERATURE: 99 F | DIASTOLIC BLOOD PRESSURE: 80 MMHG | HEIGHT: 66 IN | BODY MASS INDEX: 29.41 KG/M2 | WEIGHT: 183 LBS | OXYGEN SATURATION: 100 % | SYSTOLIC BLOOD PRESSURE: 118 MMHG

## 2019-04-22 DIAGNOSIS — Z12.31 SCREENING MAMMOGRAM, ENCOUNTER FOR: ICD-10-CM

## 2019-04-22 DIAGNOSIS — Z00.00 ANNUAL PHYSICAL EXAM: Primary | ICD-10-CM

## 2019-04-22 PROCEDURE — 99396 PREV VISIT EST AGE 40-64: CPT | Performed by: FAMILY MEDICINE

## 2019-04-22 NOTE — PROGRESS NOTES
HPI:   Shona Winters is a 41year old female who presents for a complete physical exam.  No concerns today. Has gyne. UTD on pap. Due for mammogram in June 2019. Anxiety. Controlled on current meds  Xanax for flying only. Managed by psych.  Has mouth. Disp:  Rfl:    PROPRANOLOL HCL 40 MG Oral Tab TAKE 1 TABLET BY MOUTH EVERY DAY Disp: 30 tablet Rfl: 5   ALPRAZOLAM 0.25 MG Oral Tab TAKE 1 TABLET BY MOUTH EVERY DAY AS NEEDED FOR ANXIETY Disp: 15 tablet Rfl: 0      Past Medical History:   Diagnosis Grandfather    • Heart Attack Maternal Grandfather    • Diabetes Paternal Grandmother    • Depression Paternal Grandmother    • Depression Son    • Other (Other) Son         Duchennes muscular dystrophy   • Diabetes Paternal Grandfather    • Crohn's Diseas masses, HSM or tenderness   EXTREMITIES: no edema.    NEURO: Oriented times three,cranial nerves are intact,motor and sensory are grossly intact  PSYCH: mood, thought, behavior and judgment wnl    ASSESSMENT AND PLAN:   Zohreh Burgos is a 41 year ol

## 2019-04-27 PROBLEM — F41.1 GAD (GENERALIZED ANXIETY DISORDER): Status: ACTIVE | Noted: 2019-04-27

## 2019-04-27 PROBLEM — F43.10 PTSD (POST-TRAUMATIC STRESS DISORDER): Status: ACTIVE | Noted: 2019-04-27

## 2019-04-27 PROBLEM — F43.20: Status: ACTIVE | Noted: 2019-04-27

## 2019-06-10 ENCOUNTER — HOSPITAL ENCOUNTER (OUTPATIENT)
Dept: MAMMOGRAPHY | Age: 44
Discharge: HOME | End: 2019-06-10
Attending: FAMILY MEDICINE
Payer: COMMERCIAL

## 2019-06-10 DIAGNOSIS — Z12.31 SCREENING MAMMOGRAM, ENCOUNTER FOR: ICD-10-CM

## 2019-06-10 PROCEDURE — 77063 BREAST TOMOSYNTHESIS BI: CPT | Performed by: FAMILY MEDICINE

## 2019-06-10 PROCEDURE — 77067 SCR MAMMO BI INCL CAD: CPT | Performed by: FAMILY MEDICINE

## 2019-06-18 ENCOUNTER — OFFICE VISIT (OUTPATIENT)
Dept: OBGYN CLINIC | Facility: CLINIC | Age: 44
End: 2019-06-18
Payer: COMMERCIAL

## 2019-06-18 VITALS
SYSTOLIC BLOOD PRESSURE: 110 MMHG | HEIGHT: 66 IN | BODY MASS INDEX: 29.79 KG/M2 | DIASTOLIC BLOOD PRESSURE: 66 MMHG | WEIGHT: 185.38 LBS

## 2019-06-18 DIAGNOSIS — Z01.419 WELL WOMAN EXAM WITH ROUTINE GYNECOLOGICAL EXAM: Primary | ICD-10-CM

## 2019-06-18 PROCEDURE — 99396 PREV VISIT EST AGE 40-64: CPT | Performed by: OBSTETRICS & GYNECOLOGY

## 2019-06-18 RX ORDER — LEVONORGESTREL AND ETHINYL ESTRADIOL 0.1-0.02MG
1 KIT ORAL DAILY
Qty: 1 PACKAGE | Refills: 11 | Status: SHIPPED | OUTPATIENT
Start: 2019-06-18 | End: 2020-06-01

## 2019-06-18 NOTE — PROGRESS NOTES
OB/GYN H&P     2019  3:23 PM    CC: Patient presents with:  Physical: Pt needs her BC refilled today. HPI: Shona Winters is a 41year old  here for WWE  No issues  Wants to continue on OCPs and is aware of the DVT risks.    Dysmenor FRACTURE SURGERY      repair of nasal fracture   • GASTRO - [facility]  8/16    healed ulcers   • LUMBAR EPIDURAL N/A 12/27/2016    Performed by Lulu Duque MD at [address] [place] [address] N/A 9/13/2016    Performed by Merline Pretzel Anxiety Maternal Grandmother         undiagnosed   • Cancer Maternal Grandfather    • Stroke Maternal Grandfather    • Lipids Maternal Grandfather    • Diabetes Maternal Grandfather    • Heart Attack Maternal Grandfather    • Diabetes Paternal Grandmother adnexum displays no mass and no tenderness. Left adnexum displays no mass and no tenderness. Neurological: She is alert and oriented to person, place, and time. Skin: Skin is warm and dry. Psychiatric: She has a normal mood and affect.  Her behavior i

## 2019-06-21 ENCOUNTER — HOSPITAL ENCOUNTER (OUTPATIENT)
Dept: ULTRASOUND IMAGING | Age: 44
Discharge: HOME | End: 2019-06-21
Attending: FAMILY MEDICINE
Payer: COMMERCIAL

## 2019-06-21 ENCOUNTER — HOSPITAL ENCOUNTER (OUTPATIENT)
Dept: MAMMOGRAPHY | Age: 44
Discharge: HOME | End: 2019-06-21
Attending: FAMILY MEDICINE
Payer: COMMERCIAL

## 2019-06-21 DIAGNOSIS — R92.2 INCONCLUSIVE MAMMOGRAM: ICD-10-CM

## 2019-06-21 PROCEDURE — 77065 DX MAMMO INCL CAD UNI: CPT | Performed by: FAMILY MEDICINE

## 2019-06-21 PROCEDURE — 76641 ULTRASOUND BREAST COMPLETE: CPT | Performed by: FAMILY MEDICINE

## 2019-06-21 PROCEDURE — 77061 BREAST TOMOSYNTHESIS UNI: CPT | Performed by: FAMILY MEDICINE

## 2019-06-26 RX ORDER — PROPRANOLOL HYDROCHLORIDE 40 MG/1
TABLET ORAL
Qty: 30 TABLET | Refills: 0 | Status: SHIPPED | OUTPATIENT
Start: 2019-06-26 | End: 2019-07-27

## 2019-06-26 NOTE — TELEPHONE ENCOUNTER
Medication Quantity Refills Start End   PROPRANOLOL HCL 40 MG Oral Tab 30 tablet 5 11/12/2018    Sig:   TAKE 1 TABLET BY MOUTH EVERY DAY       Last labs 04/20/19  Last seen on 04/22/19 cc:annual px exam  /80  Future Appointments   Date Time Provider

## 2019-07-27 RX ORDER — PROPRANOLOL HYDROCHLORIDE 40 MG/1
TABLET ORAL
Qty: 30 TABLET | Refills: 0 | Status: SHIPPED | OUTPATIENT
Start: 2019-07-27 | End: 2019-09-10

## 2019-07-27 NOTE — TELEPHONE ENCOUNTER
LOV: 4/22/19 for annual physical exam    PROPRANOLOL HCL 40 MG Oral Tab 30 tablet 0 6/26/2019    Sig: Justa Adler 1 TABLET BY MOUTH EVERY DAY     Route:   (none)       Future Appointments   Date Time Provider Venkata Guillaume   8/12/2019  1:45 PM Terri Lainez

## 2019-09-09 ENCOUNTER — TELEPHONE (OUTPATIENT)
Dept: FAMILY MEDICINE CLINIC | Facility: CLINIC | Age: 44
End: 2019-09-09

## 2019-09-09 NOTE — TELEPHONE ENCOUNTER
Pt unable to come on Wednesday. Scheduled for Friday.     Future Appointments   Date Time Provider Venkata Carballoi   9/13/2019 11:40 AM Kati Esteban MD [facility] [place]   9/27/2019  1:45 PM Hubert Wernerregina. 15

## 2019-09-09 NOTE — TELEPHONE ENCOUNTER
[facility] stating pt having R hammer toe surgery on 9/23/19 with Dr. Bassem Watson. Will need an H&P along with a CBC and CMP. Ok to schedule? Fax in DB's pre-op folder.

## 2019-09-10 RX ORDER — PROPRANOLOL HYDROCHLORIDE 40 MG/1
TABLET ORAL
Qty: 30 TABLET | Refills: 0 | Status: SHIPPED | OUTPATIENT
Start: 2019-09-10 | End: 2019-10-07

## 2019-09-10 NOTE — TELEPHONE ENCOUNTER
Last refilled on 7/27/19 for # 30 with 0 refills  Last OV 4/22/19, /66  Future Appointments   Date Time Provider Venkata Aundrea   9/13/2019 11:40 AM Bibiana Velez MD [facility] [place]   9/27/2019  1:45 PM Oralia Mathistr. 15

## 2019-09-13 ENCOUNTER — OFFICE VISIT (OUTPATIENT)
Dept: FAMILY MEDICINE CLINIC | Facility: CLINIC | Age: 44
End: 2019-09-13
Payer: COMMERCIAL

## 2019-09-13 VITALS
WEIGHT: 187 LBS | TEMPERATURE: 98 F | DIASTOLIC BLOOD PRESSURE: 72 MMHG | HEIGHT: 66 IN | RESPIRATION RATE: 16 BRPM | OXYGEN SATURATION: 97 % | HEART RATE: 84 BPM | SYSTOLIC BLOOD PRESSURE: 116 MMHG | BODY MASS INDEX: 30.05 KG/M2

## 2019-09-13 DIAGNOSIS — Z01.818 PRE-OP EXAM: Primary | ICD-10-CM

## 2019-09-13 LAB
ALBUMIN SERPL-MCNC: 3.9 G/DL (ref 3.4–5)
ALBUMIN/GLOB SERPL: 1 {RATIO} (ref 1–2)
ALP LIVER SERPL-CCNC: 73 U/L (ref 37–98)
ALT SERPL-CCNC: 35 U/L (ref 13–56)
ANION GAP SERPL CALC-SCNC: 5 MMOL/L (ref 0–18)
AST SERPL-CCNC: 25 U/L (ref 15–37)
BASOPHILS # BLD AUTO: 0.02 X10(3) UL (ref 0–0.2)
BASOPHILS NFR BLD AUTO: 0.2 %
BILIRUB SERPL-MCNC: 0.4 MG/DL (ref 0.1–2)
BUN BLD-MCNC: 13 MG/DL (ref 7–18)
BUN/CREAT SERPL: 14.3 (ref 10–20)
CALCIUM BLD-MCNC: 9.7 MG/DL (ref 8.5–10.1)
CHLORIDE SERPL-SCNC: 105 MMOL/L (ref 98–112)
CO2 SERPL-SCNC: 27 MMOL/L (ref 21–32)
CREAT BLD-MCNC: 0.91 MG/DL (ref 0.55–1.02)
DEPRECATED RDW RBC AUTO: 44.6 FL (ref 35.1–46.3)
EOSINOPHIL # BLD AUTO: 0.14 X10(3) UL (ref 0–0.7)
EOSINOPHIL NFR BLD AUTO: 1.7 %
ERYTHROCYTE [DISTWIDTH] IN BLOOD BY AUTOMATED COUNT: 14.5 % (ref 11–15)
GLOBULIN PLAS-MCNC: 4.1 G/DL (ref 2.8–4.4)
GLUCOSE BLD-MCNC: 85 MG/DL (ref 70–99)
HCT VFR BLD AUTO: 41.2 % (ref 35–48)
HGB BLD-MCNC: 13.8 G/DL (ref 12–16)
IMM GRANULOCYTES # BLD AUTO: 0.01 X10(3) UL (ref 0–1)
IMM GRANULOCYTES NFR BLD: 0.1 %
LYMPHOCYTES # BLD AUTO: 2.12 X10(3) UL (ref 1–4)
LYMPHOCYTES NFR BLD AUTO: 26.1 %
M PROTEIN MFR SERPL ELPH: 8 G/DL (ref 6.4–8.2)
MCH RBC QN AUTO: 28.5 PG (ref 26–34)
MCHC RBC AUTO-ENTMCNC: 33.5 G/DL (ref 31–37)
MCV RBC AUTO: 84.9 FL (ref 80–100)
MONOCYTES # BLD AUTO: 0.52 X10(3) UL (ref 0.1–1)
MONOCYTES NFR BLD AUTO: 6.4 %
NEUTROPHILS # BLD AUTO: 5.3 X10 (3) UL (ref 1.5–7.7)
NEUTROPHILS # BLD AUTO: 5.3 X10(3) UL (ref 1.5–7.7)
NEUTROPHILS NFR BLD AUTO: 65.5 %
OSMOLALITY SERPL CALC.SUM OF ELEC: 283 MOSM/KG (ref 275–295)
PLATELET # BLD AUTO: 290 10(3)UL (ref 150–450)
POTASSIUM SERPL-SCNC: 4 MMOL/L (ref 3.5–5.1)
RBC # BLD AUTO: 4.85 X10(6)UL (ref 3.8–5.3)
SODIUM SERPL-SCNC: 137 MMOL/L (ref 136–145)
WBC # BLD AUTO: 8.1 X10(3) UL (ref 4–11)

## 2019-09-13 PROCEDURE — 99213 OFFICE O/P EST LOW 20 MIN: CPT | Performed by: FAMILY MEDICINE

## 2019-09-13 PROCEDURE — 85025 COMPLETE CBC W/AUTO DIFF WBC: CPT | Performed by: FAMILY MEDICINE

## 2019-09-13 PROCEDURE — 80053 COMPREHEN METABOLIC PANEL: CPT | Performed by: FAMILY MEDICINE

## 2019-09-13 NOTE — PROGRESS NOTES
Jazzy Ross is a 41year old female who presents for a pre-operative physical exam.   Patient is to have arthroplasty of 5th digit of right foot, to be done by Dr. Mary Enamorado at [facility] on 9/23/19. HPI:   No concerns today.       Cu after meals    • Weight loss       Past Surgical History:   Procedure Laterality Date   • COLPOSCOPY, CERVIX W/UPPER ADJACENT VAGINA; W/BIOPSY(S), CERVIX  2008    HPV+   • ESOPHAGOGASTRODUODENOSCOPY, POSSIBLE BIOPSY, POSSIBLE POLYPECTOMY 78463 N/A 8/9/2016 1.00        Years: 18.00        Pack years: 25        Quit date: 2008        Years since quittin.6      Smokeless tobacco: Never Used      Tobacco comment: non-smoker    Alcohol use:  Yes      Alcohol/week: 3.0 standard drinks      Types: 3 Standa

## 2019-10-07 RX ORDER — PROPRANOLOL HYDROCHLORIDE 40 MG/1
TABLET ORAL
Qty: 30 TABLET | Refills: 0 | Status: SHIPPED | OUTPATIENT
Start: 2019-10-07 | End: 2019-11-14

## 2019-10-07 NOTE — TELEPHONE ENCOUNTER
Last refilled on 9/10/19 for # 30 with 0 refills  Last OV 9/13/19, /72  Future Appointments   Date Time Provider Venkata Guillaume   11/7/2019  1:00 PM Katrina Fontenot, SHELL [facility]        Thank you.

## 2019-11-14 RX ORDER — PROPRANOLOL HYDROCHLORIDE 40 MG/1
TABLET ORAL
Qty: 30 TABLET | Refills: 2 | Status: SHIPPED | OUTPATIENT
Start: 2019-11-14 | End: 2020-03-09

## 2019-11-14 NOTE — TELEPHONE ENCOUNTER
Last refilled on 10/7/19 for # 30 with 0 refills  Last OV 9/13/19, /72  Future Appointments   Date Time Provider Venkata Guillaume   12/10/2019  3:00 PM John Flores, APRN [address] [facility]        Thank you.

## 2019-11-19 ENCOUNTER — TELEPHONE (OUTPATIENT)
Dept: FAMILY MEDICINE CLINIC | Facility: CLINIC | Age: 44
End: 2019-11-19

## 2019-11-19 NOTE — TELEPHONE ENCOUNTER
[facility]. Patient having surgery at [facility] on 12/20/19 for 1st metatarsal osteotomy and akin bunionectomy and arthroplasty 5th digit-left foot. Faxed pre-op forms. Will await fax.   Placed in DB Pre-op folder

## 2019-11-20 NOTE — TELEPHONE ENCOUNTER
Pt notified and verbalized understanding. OV scheduled.   Future Appointments   Date Time Provider Venkata Aundrea   12/9/2019  5:40 PM Chastity Le MD [facility] [place]   12/10/2019  3:00 PM Shanelle Mitchell. 15

## 2019-12-09 ENCOUNTER — OFFICE VISIT (OUTPATIENT)
Dept: FAMILY MEDICINE CLINIC | Facility: CLINIC | Age: 44
End: 2019-12-09
Payer: COMMERCIAL

## 2019-12-09 VITALS
DIASTOLIC BLOOD PRESSURE: 72 MMHG | OXYGEN SATURATION: 99 % | TEMPERATURE: 97 F | BODY MASS INDEX: 29.73 KG/M2 | RESPIRATION RATE: 18 BRPM | SYSTOLIC BLOOD PRESSURE: 112 MMHG | HEIGHT: 66 IN | HEART RATE: 60 BPM | WEIGHT: 185 LBS

## 2019-12-09 DIAGNOSIS — Z23 NEED FOR VACCINATION: ICD-10-CM

## 2019-12-09 DIAGNOSIS — Z01.818 PRE-OP EXAMINATION: Primary | ICD-10-CM

## 2019-12-09 PROCEDURE — 99213 OFFICE O/P EST LOW 20 MIN: CPT | Performed by: FAMILY MEDICINE

## 2019-12-09 PROCEDURE — 85025 COMPLETE CBC W/AUTO DIFF WBC: CPT | Performed by: FAMILY MEDICINE

## 2019-12-09 PROCEDURE — 90686 IIV4 VACC NO PRSV 0.5 ML IM: CPT | Performed by: FAMILY MEDICINE

## 2019-12-09 PROCEDURE — 90471 IMMUNIZATION ADMIN: CPT | Performed by: FAMILY MEDICINE

## 2019-12-09 PROCEDURE — 80053 COMPREHEN METABOLIC PANEL: CPT | Performed by: FAMILY MEDICINE

## 2019-12-09 NOTE — PROGRESS NOTES
Hayden Contreras is a 41year old female who presents for a pre-operative physical exam. Patient is to have 1st metatarsal osteotomy, bunionectomy, arthroplasty 5th digit, to be done by Dr. Amy Brown at [address] on 12/20/19.       HPI:   No concerns t W/UPPER ADJACENT VAGINA; W/BIOPSY(S), CERVIX  2008    HPV+   • ESOPHAGOGASTRODUODENOSCOPY, POSSIBLE BIOPSY, POSSIBLE POLYPECTOMY 83684 N/A 8/9/2016    Performed by Cleopatra Ahuja MD at [address] [place] [address]   • ESOPHAGOGASTRODUODENOSCOPY, POSSIBLE BIOPSY, PO Smokeless tobacco: Never Used      Tobacco comment: non-smoker    Alcohol use:  Yes      Alcohol/week: 3.0 standard drinks      Types: 3 Standard drinks or equivalent per week      Frequency: 4 or more times a week      Drinks per session: 1 or 2      [address] [place] [address]

## 2019-12-11 ENCOUNTER — TELEPHONE (OUTPATIENT)
Dept: FAMILY MEDICINE CLINIC | Facility: CLINIC | Age: 44
End: 2019-12-11

## 2019-12-11 NOTE — TELEPHONE ENCOUNTER
Left detailed message advising results. Ok per Roula Moore form. Advised to call with questions. Faxed to Dr Gregory Ray.   [phone]

## 2019-12-11 NOTE — TELEPHONE ENCOUNTER
----- Message from Crystal Soliz MD sent at 12/11/2019 12:05 AM CST -----  LC brantley.  Fax to Dr. Wade Simental office

## 2020-03-08 ENCOUNTER — HOSPITAL ENCOUNTER (OUTPATIENT)
Age: 45
Discharge: HOME | End: 2020-03-08
Attending: FAMILY MEDICINE
Payer: COMMERCIAL

## 2020-03-08 VITALS
RESPIRATION RATE: 14 BRPM | DIASTOLIC BLOOD PRESSURE: 73 MMHG | HEART RATE: 89 BPM | TEMPERATURE: 97 F | SYSTOLIC BLOOD PRESSURE: 127 MMHG | OXYGEN SATURATION: 100 %

## 2020-03-08 DIAGNOSIS — M54.6 ACUTE LEFT-SIDED THORACIC BACK PAIN: Primary | ICD-10-CM

## 2020-03-08 DIAGNOSIS — J30.9 ALLERGIC RHINITIS, UNSPECIFIED SEASONALITY, UNSPECIFIED TRIGGER: ICD-10-CM

## 2020-03-08 DIAGNOSIS — R07.81 RIB PAIN ON LEFT SIDE: ICD-10-CM

## 2020-03-08 PROCEDURE — 99214 OFFICE O/P EST MOD 30 MIN: CPT

## 2020-03-08 PROCEDURE — 99213 OFFICE O/P EST LOW 20 MIN: CPT

## 2020-03-08 RX ORDER — IPRATROPIUM BROMIDE 21 UG/1
2 SPRAY, METERED NASAL EVERY 12 HOURS
Qty: 1 BOTTLE | Refills: 0 | Status: SHIPPED | OUTPATIENT
Start: 2020-03-08 | End: 2020-06-01

## 2020-03-08 NOTE — ED INITIAL ASSESSMENT (HPI)
Upper thoracic back pain. Ongoing issue of rip popping out of place. Pt saw chiropractor yesterday and popped rib back in place. Pt went home, coughed and popped rib out of place again.  Pt wanting to know if there is anything else she could be doing to man

## 2020-03-08 NOTE — ED PROVIDER NOTES
Patient Seen in: [address] [facility]      History   Patient presents with:  Back Pain    Stated Complaint: back pain    HPI    This 53-year-old female presents to the office with complaint of left upper thoracic back pain/rib pain which start ESOPHAGOGASTRODUODENOSCOPY, POSSIBLE BIOPSY, POSSIBLE POLYPECTOMY 13219 N/A 5/17/2016    Performed by Ramonita Rod MD at [facility]      repair of nasal fracture   • GASTRO - [facility]  8/16    healed ulcers   • LUMBAR EPIDURAL N/ anicteric,  conjunctiva normal.  EARS: Tympanic membranes normal, EAC's normal.  NOSE: Turbinates congested, no bleeding noted.   PHARYNX: Injected, post nasal drip is noted, no exudates seen, airway patent, uvula midline  NECK:  No cervical lymphadenopathy taking these medications    Ipratropium Bromide 0.03 % Nasal Solution  2 sprays by Nasal route every 12 (twelve) hours.   Qty: 1 Bottle Refills: 0          Use the Atrovent nasal spray 2 sprays each nostril twice daily for the sinus congestion and postnasal

## 2020-03-09 RX ORDER — PROPRANOLOL HYDROCHLORIDE 40 MG/1
TABLET ORAL
Qty: 30 TABLET | Refills: 0 | Status: SHIPPED | OUTPATIENT
Start: 2020-03-09 | End: 2020-04-30

## 2020-03-09 NOTE — TELEPHONE ENCOUNTER
Hypertension Medications Protocol Passed3/8 9:50 PM   CMP or BMP in past 12 months    Last serum creatinine< 2.0    Appointment in past 6 or next 3 months     Last refilled on 11/14/19 for # 30 with 2 refills  Last CMP 12/9/19  Last OV 12/9/19 for pre-op

## 2020-04-14 ENCOUNTER — TELEPHONE (OUTPATIENT)
Dept: FAMILY MEDICINE CLINIC | Facility: CLINIC | Age: 45
End: 2020-04-14

## 2020-04-14 DIAGNOSIS — J32.9 OTHER SINUSITIS, UNSPECIFIED CHRONICITY: Primary | ICD-10-CM

## 2020-04-14 PROCEDURE — 99212 OFFICE O/P EST SF 10 MIN: CPT | Performed by: FAMILY MEDICINE

## 2020-04-14 RX ORDER — AZITHROMYCIN 250 MG/1
TABLET, FILM COATED ORAL
Qty: 6 TABLET | Refills: 0 | Status: SHIPPED | OUTPATIENT
Start: 2020-04-14 | End: 2020-04-19

## 2020-04-14 NOTE — TELEPHONE ENCOUNTER
Virtual Telephone Check-In    [address] [place] [address] verbally consents to a Virtual/Telephone Check-In visit on 04/14/20.     Patient understands and accepts financial responsibility for any deductible, co-insurance and/or co-pays associated with this servic

## 2020-04-14 NOTE — TELEPHONE ENCOUNTER
Pt has a possible sinus infection for the last 2 months,  Has been using a patricia pot and otc medications but not working,  Has pressure in her cheeks, sinus drainage,  Also states can feel pressure in the roof of her mouth.     Pt does have a cough,  But fe

## 2020-04-14 NOTE — TELEPHONE ENCOUNTER
Spoke to patient. Feels like she has had sinus infection for 2 months. Has tried Claritin, allegra, flonase, and patricia pot. Still has pressure in her cheeks and roof of mouth. Not a lot of nasal drainage but when she blows her nose it is green.    Has

## 2020-04-30 RX ORDER — PROPRANOLOL HYDROCHLORIDE 40 MG/1
TABLET ORAL
Qty: 30 TABLET | Refills: 0 | Status: SHIPPED | OUTPATIENT
Start: 2020-04-30 | End: 2020-06-01

## 2020-04-30 NOTE — TELEPHONE ENCOUNTER
Hypertension Medications Protocol Passed4/29 7:14 PM   CMP or BMP in past 12 months    Last serum creatinine< 2.0    Appointment in past 6 or next 3 months     PROPRANOLOL HCL 40 MG Oral Tab    Last refilled: 3/9/20 - 30 tablets - 0 refills    Last OV: 12/

## 2020-06-01 ENCOUNTER — OFFICE VISIT (OUTPATIENT)
Dept: FAMILY MEDICINE CLINIC | Facility: CLINIC | Age: 45
End: 2020-06-01
Payer: COMMERCIAL

## 2020-06-01 VITALS
RESPIRATION RATE: 18 BRPM | HEART RATE: 79 BPM | SYSTOLIC BLOOD PRESSURE: 112 MMHG | DIASTOLIC BLOOD PRESSURE: 70 MMHG | OXYGEN SATURATION: 99 % | BODY MASS INDEX: 30.29 KG/M2 | WEIGHT: 188.5 LBS | HEIGHT: 66 IN | TEMPERATURE: 97 F

## 2020-06-01 DIAGNOSIS — S63.501A SPRAIN OF RIGHT WRIST, INITIAL ENCOUNTER: Primary | ICD-10-CM

## 2020-06-01 PROCEDURE — 99214 OFFICE O/P EST MOD 30 MIN: CPT | Performed by: FAMILY MEDICINE

## 2020-06-01 RX ORDER — VENLAFAXINE HYDROCHLORIDE 150 MG/1
150 CAPSULE, EXTENDED RELEASE ORAL DAILY
COMMUNITY
Start: 2020-05-12 | End: 2020-07-23

## 2020-06-01 RX ORDER — PROPRANOLOL HYDROCHLORIDE 40 MG/1
TABLET ORAL
Qty: 30 TABLET | Refills: 2 | Status: SHIPPED | OUTPATIENT
Start: 2020-06-01 | End: 2020-09-16

## 2020-06-01 RX ORDER — VENLAFAXINE 75 MG/1
75 TABLET ORAL DAILY
COMMUNITY
End: 2020-06-01

## 2020-06-01 RX ORDER — METHYLPREDNISOLONE 4 MG/1
TABLET ORAL
Qty: 1 KIT | Refills: 0 | Status: SHIPPED | OUTPATIENT
Start: 2020-06-01 | End: 2020-06-13

## 2020-06-01 RX ORDER — LEVONORGESTREL AND ETHINYL ESTRADIOL 0.1-0.02MG
KIT ORAL
Qty: 84 TABLET | Refills: 0 | Status: SHIPPED | OUTPATIENT
Start: 2020-06-01 | End: 2020-08-27

## 2020-06-01 NOTE — TELEPHONE ENCOUNTER
Hypertension Medications Protocol Passed5/31 10:51 AM   CMP or BMP in past 12 months    Last serum creatinine< 2.0    Appointment in past 6 or next 3 months     Last refill - 4/30/20 - #30   Last CMP - 12/9/19 - creatinine - 1.04  Last ov - 6/1/20   Refill

## 2020-06-01 NOTE — PROGRESS NOTES
Patient presents with:  Wrist Pain: x 3 weeks       HPI:    Patient ID: Latricia Hernandez is a 45year old female here for office visit. Right wrist pain x 3 weeks  Pain radiates to forearm  No known injury but does lift disabled adult son.   Carlos Yeager • Depression    • Dysmenorrhea    • Esophageal reflux    • Frequent use of laxatives    • History of depression    • History of stomach ulcers    • Irregular bowel habits    • Loss of appetite    • Migraine    • Migraines    • Nausea    • Other and unspe Duchennes muscular dystrophy   • Diabetes Paternal Grandfather    • Crohn's Disease Maternal Aunt    • Psychiatric Sister         postpartum depression   • Depression Other         pat uncle   • Suicide History Other         pat uncle completed Visit:  Requested Prescriptions     Signed Prescriptions Disp Refills   • methylPREDNISolone (MEDROL) 4 MG Oral Tablet Therapy Pack 1 kit 0     Sig: As directed.        Imaging & Referrals:  None

## 2020-06-13 ENCOUNTER — OFFICE VISIT (OUTPATIENT)
Dept: FAMILY MEDICINE CLINIC | Facility: CLINIC | Age: 45
End: 2020-06-13
Payer: COMMERCIAL

## 2020-06-13 VITALS
HEIGHT: 66 IN | DIASTOLIC BLOOD PRESSURE: 74 MMHG | WEIGHT: 185 LBS | TEMPERATURE: 97 F | OXYGEN SATURATION: 98 % | SYSTOLIC BLOOD PRESSURE: 118 MMHG | HEART RATE: 80 BPM | RESPIRATION RATE: 16 BRPM | BODY MASS INDEX: 29.73 KG/M2

## 2020-06-13 DIAGNOSIS — J01.00 ACUTE MAXILLARY SINUSITIS, RECURRENCE NOT SPECIFIED: Primary | ICD-10-CM

## 2020-06-13 PROCEDURE — 99213 OFFICE O/P EST LOW 20 MIN: CPT | Performed by: FAMILY MEDICINE

## 2020-06-13 RX ORDER — AMOXICILLIN AND CLAVULANATE POTASSIUM 875; 125 MG/1; MG/1
1 TABLET, FILM COATED ORAL 2 TIMES DAILY
Qty: 20 TABLET | Refills: 0 | Status: SHIPPED | OUTPATIENT
Start: 2020-06-13 | End: 2020-06-23 | Stop reason: ALTCHOICE

## 2020-06-13 NOTE — PROGRESS NOTES
Patient presents with:  Sinusitis: clogged x 3 mo. per pt       HPI:    Patient ID: Dean Turipn is a 45year old female. HPI   Patient is here for sinusitis. She reports feeling of sinus clogged and sinus pressure. No fever.  Mild cough and co Bloating    • Constipation    • Depression    • Dysmenorrhea    • Esophageal reflux    • Frequent use of laxatives    • History of depression    • History of stomach ulcers    • Irregular bowel habits    • Loss of appetite    • Migraine    • Migraines    • Son    • Other (Other) Son         Duchennes muscular dystrophy   • Diabetes Paternal Grandfather    • Crohn's Disease Maternal Aunt    • Psychiatric Sister         postpartum depression   • Depression Other         pat uncle   • Suicide History Other

## 2020-06-20 ENCOUNTER — TELEPHONE (OUTPATIENT)
Dept: FAMILY MEDICINE CLINIC | Facility: CLINIC | Age: 45
End: 2020-06-20

## 2020-06-20 RX ORDER — FLUCONAZOLE 150 MG/1
150 TABLET ORAL ONCE
Qty: 1 TABLET | Refills: 0 | Status: SHIPPED | OUTPATIENT
Start: 2020-06-20 | End: 2020-06-20

## 2020-06-20 NOTE — TELEPHONE ENCOUNTER
Pt was seen on 6/13,  Was prescribed an antibiotic for sinus infection,  This has caused pt to have a yeast infection,  Wants to know if she can get a rx for this,  States the pill works faster than the cream.  Please advise   Uses [facility] on [address]

## 2020-06-20 NOTE — TELEPHONE ENCOUNTER
Spoke to patient. Has had vaginal itching and white/yellow discharge for past 4 days. Hernández when urinates. Denies discoloration of urine. Denies fever, body aches. Tried cream for yeast infection but not helping.    Still has a couple doses left o

## 2020-06-22 ENCOUNTER — OFFICE VISIT (OUTPATIENT)
Dept: FAMILY MEDICINE CLINIC | Facility: CLINIC | Age: 45
End: 2020-06-22
Payer: COMMERCIAL

## 2020-06-22 VITALS
HEART RATE: 85 BPM | OXYGEN SATURATION: 98 % | TEMPERATURE: 97 F | HEIGHT: 66 IN | SYSTOLIC BLOOD PRESSURE: 118 MMHG | DIASTOLIC BLOOD PRESSURE: 76 MMHG | RESPIRATION RATE: 18 BRPM | BODY MASS INDEX: 30.7 KG/M2 | WEIGHT: 191 LBS

## 2020-06-22 DIAGNOSIS — B37.3 VAGINAL YEAST INFECTION: ICD-10-CM

## 2020-06-22 DIAGNOSIS — Z00.00 ANNUAL PHYSICAL EXAM: Primary | ICD-10-CM

## 2020-06-22 DIAGNOSIS — G43.109 MIGRAINE WITH AURA AND WITHOUT STATUS MIGRAINOSUS, NOT INTRACTABLE: ICD-10-CM

## 2020-06-22 DIAGNOSIS — Z12.39 BREAST CANCER SCREENING: ICD-10-CM

## 2020-06-22 PROCEDURE — 83036 HEMOGLOBIN GLYCOSYLATED A1C: CPT | Performed by: FAMILY MEDICINE

## 2020-06-22 PROCEDURE — 80050 GENERAL HEALTH PANEL: CPT | Performed by: FAMILY MEDICINE

## 2020-06-22 PROCEDURE — 80061 LIPID PANEL: CPT | Performed by: FAMILY MEDICINE

## 2020-06-22 PROCEDURE — 83721 ASSAY OF BLOOD LIPOPROTEIN: CPT | Performed by: FAMILY MEDICINE

## 2020-06-22 PROCEDURE — 82306 VITAMIN D 25 HYDROXY: CPT | Performed by: FAMILY MEDICINE

## 2020-06-22 PROCEDURE — 36415 COLL VENOUS BLD VENIPUNCTURE: CPT | Performed by: FAMILY MEDICINE

## 2020-06-22 PROCEDURE — 99396 PREV VISIT EST AGE 40-64: CPT | Performed by: FAMILY MEDICINE

## 2020-06-22 RX ORDER — FLUCONAZOLE 150 MG/1
150 TABLET ORAL ONCE
Qty: 1 TABLET | Refills: 0 | Status: SHIPPED | OUTPATIENT
Start: 2020-06-22 | End: 2020-06-22

## 2020-06-22 NOTE — PROGRESS NOTES
HPI:   Jil Truong is a 45year old female who presents for a complete physical exam.  Pap UTD. Has gyne. Due for mammo. No concerns today. Has hx of anxiety. Mx by psychiatrist. Ely Tavares them every 6-8 weeks    F/u on Migraines  Controlled.  Take TABLET BY MOUTH EVERY DAY 30 tablet 2   • Venlafaxine HCl  MG Oral Capsule SR 24 Hr Take 150 mg by mouth daily.      • LESSINA 0.1-20 MG-MCG Oral Tab TAKE 1 TABLET BY MOUTH DAILY 84 tablet 0   • divalproex Sodium ER (DEPAKOTE ER) 250 MG Oral Tablet 24 GASTRO - [facility]  8/16    healed ulcers   • LUMBAR EPIDURAL N/A 12/27/2016    Performed by Marcio Oreilly MD at [facility] [facility] 9/13/2016    Performed by Anjelica Gonzáles MD at [address] [facility]   • OTHER x/week  Diet: well balanced     REVIEW OF SYSTEMS:   GENERAL: feels well   SKIN: denies any unusual skin lesions  EYES:denies blurred vision or double vision  HEENT: denies nasal congestion, sinus pain or ST  LUNGS: denies shortness of breath or cough with patient indicates understanding of these issues and agrees to the plan. The patient is asked to return for CPX in 1 year.

## 2020-06-24 ENCOUNTER — LAB ENCOUNTER (OUTPATIENT)
Dept: LAB | Facility: HOSPITAL | Age: 45
End: 2020-06-24
Attending: PHYSICIAN ASSISTANT
Payer: COMMERCIAL

## 2020-06-24 DIAGNOSIS — Z01.818 PRE-OP TESTING: ICD-10-CM

## 2020-06-27 PROBLEM — R13.10 DYSPHAGIA, UNSPECIFIED: Status: ACTIVE | Noted: 2020-06-27

## 2020-06-27 PROBLEM — R10.13 ABDOMINAL PAIN, EPIGASTRIC: Status: ACTIVE | Noted: 2020-06-27

## 2020-06-27 PROBLEM — R12 CHRONIC HEARTBURN: Status: ACTIVE | Noted: 2020-06-27

## 2020-07-01 ENCOUNTER — TELEPHONE (OUTPATIENT)
Dept: FAMILY MEDICINE CLINIC | Facility: CLINIC | Age: 45
End: 2020-07-01

## 2020-07-01 NOTE — TELEPHONE ENCOUNTER
Pt notified and verbalized understanding.      Hayden Contreras verbally consents to a Virtual/Telephone Check-In service on   Future Appointments   Date Time Provider Venkata Guillaume   7/2/2020 12:00 PM Clemencia Bravo MD [facility] [place]   7/20/20

## 2020-07-02 ENCOUNTER — VIRTUAL PHONE E/M (OUTPATIENT)
Dept: FAMILY MEDICINE CLINIC | Facility: CLINIC | Age: 45
End: 2020-07-02
Payer: COMMERCIAL

## 2020-07-02 DIAGNOSIS — E78.6 LOW HDL (UNDER 40): ICD-10-CM

## 2020-07-02 DIAGNOSIS — E55.9 VITAMIN D DEFICIENCY: Primary | ICD-10-CM

## 2020-07-02 DIAGNOSIS — E78.1 HYPERTRIGLYCERIDEMIA: ICD-10-CM

## 2020-07-02 PROCEDURE — 99213 OFFICE O/P EST LOW 20 MIN: CPT | Performed by: FAMILY MEDICINE

## 2020-07-02 RX ORDER — ERGOCALCIFEROL 1.25 MG/1
CAPSULE ORAL
Qty: 12 CAPSULE | Refills: 0 | Status: SHIPPED | OUTPATIENT
Start: 2020-07-02 | End: 2020-11-25

## 2020-07-02 RX ORDER — FENOFIBRATE 40 MG/1
40 TABLET ORAL
Qty: 90 TABLET | Refills: 0 | Status: SHIPPED | OUTPATIENT
Start: 2020-07-02 | End: 2020-08-01

## 2020-07-02 NOTE — PROGRESS NOTES
Patient presents with:  Lab      HPI:    Patient ID: Antoinette Byrd is a 45year old female doing a phone visit today to discuss BW. Trig 436. HDL 23, Tchol 191  +FHx of hyperlipidemia. Pt was started on med years ago by another PCP.  Med was stopp Allergies    HISTORY:  Past Medical History:   Diagnosis Date   • Abdominal distention    • Abdominal pain    • Anxiety state, unspecified    • Back pain    • Bad breath    • Belching    • Bloating    • Constipation    • Depression    • Dysmenorrhea    • E Grandmother         undiagnosed   • Cancer Maternal Grandfather    • Stroke Maternal Grandfather    • Lipids Maternal Grandfather    • Diabetes Maternal Grandfather    • Heart Attack Maternal Grandfather    • Diabetes Paternal Grandmother    • Depression P

## 2020-07-11 ENCOUNTER — TELEPHONE (OUTPATIENT)
Dept: FAMILY MEDICINE CLINIC | Facility: CLINIC | Age: 45
End: 2020-07-11

## 2020-07-11 NOTE — TELEPHONE ENCOUNTER
Call from patient. Started fenofibrate last week and took 2 doses. Last night got very itchy with tiny red bumps. No tongue, throat or lip swelling. No SOB or wheezing. Didn't take med last night or today.   Spoke with Dr Carri Burt stop medication, be

## 2020-07-11 NOTE — TELEPHONE ENCOUNTER
Pt called stated Dr. Fay Rubio put her on a new medication Fenofibrate. She stated yesterday at her son's baseball game she became exteremly itchy. She stated she has been taking it for x2 days and did not take it last night.

## 2020-07-13 ENCOUNTER — VIRTUAL PHONE E/M (OUTPATIENT)
Dept: FAMILY MEDICINE CLINIC | Facility: CLINIC | Age: 45
End: 2020-07-13
Payer: COMMERCIAL

## 2020-07-13 DIAGNOSIS — E78.1 HYPERTRIGLYCERIDEMIA: Primary | ICD-10-CM

## 2020-07-13 DIAGNOSIS — E78.6 LOW HDL (UNDER 40): ICD-10-CM

## 2020-07-13 DIAGNOSIS — T88.7XXA SIDE EFFECT OF MEDICATION: ICD-10-CM

## 2020-07-13 PROCEDURE — 99213 OFFICE O/P EST LOW 20 MIN: CPT | Performed by: FAMILY MEDICINE

## 2020-07-13 NOTE — PROGRESS NOTES
Patient presents with:  Side Effect       HPI:    Patient ID: James Osei is a 45year old female doing telemed visit today. F/u on possible side effect from new med. C/o itchiness of b/l upper arms 2 days after starting fenofibrate.   Few hives pain    • Anxiety state, unspecified    • Back pain    • Bad breath    • Belching    • Bloating    • Constipation    • Depression    • Dysmenorrhea    • Esophageal reflux    • Frequent use of laxatives    • History of depression    • History of stomach ulc Maternal Grandfather    • Diabetes Maternal Grandfather    • Heart Attack Maternal Grandfather    • Diabetes Paternal Grandmother    • Depression Paternal Grandmother    • Depression Son    • Other (Other) Son         Duchennes muscular dystrophy   • Diabe

## 2020-07-15 RX ORDER — SIMVASTATIN 10 MG
10 TABLET ORAL DAILY
Qty: 30 TABLET | Refills: 2 | Status: SHIPPED | OUTPATIENT
Start: 2020-07-15 | End: 2020-11-04

## 2020-07-23 ENCOUNTER — TELEPHONE (OUTPATIENT)
Dept: FAMILY MEDICINE CLINIC | Facility: CLINIC | Age: 45
End: 2020-07-23

## 2020-08-01 RX ORDER — SUMATRIPTAN 100 MG/1
TABLET, FILM COATED ORAL
Qty: 9 TABLET | Refills: 0 | Status: SHIPPED | OUTPATIENT
Start: 2020-08-01 | End: 2020-12-05

## 2020-08-01 NOTE — TELEPHONE ENCOUNTER
LOV virtual visit on 7/13/20 for hypertriglyceridemia.      SUMAtriptan Succinate 100 MG Oral Tab 9 tablet 0 3/21/2019    Sig:   TAKE 1 TABLET BY MOUTH DAILY AS NEEDED FOR MIGRAINE; TO TAKE WITHIN 30 MINUTES AT ONSET OF HEADACHE     Route:   (none)       Fu

## 2020-08-10 ENCOUNTER — OFFICE VISIT (OUTPATIENT)
Dept: FAMILY MEDICINE CLINIC | Facility: CLINIC | Age: 45
End: 2020-08-10
Payer: COMMERCIAL

## 2020-08-10 VITALS
BODY MASS INDEX: 31 KG/M2 | TEMPERATURE: 97 F | HEART RATE: 83 BPM | RESPIRATION RATE: 18 BRPM | DIASTOLIC BLOOD PRESSURE: 72 MMHG | SYSTOLIC BLOOD PRESSURE: 110 MMHG | OXYGEN SATURATION: 98 % | WEIGHT: 190 LBS

## 2020-08-10 DIAGNOSIS — M77.8 LEFT ELBOW TENDONITIS: Primary | ICD-10-CM

## 2020-08-10 PROCEDURE — 3074F SYST BP LT 130 MM HG: CPT | Performed by: FAMILY MEDICINE

## 2020-08-10 PROCEDURE — 99214 OFFICE O/P EST MOD 30 MIN: CPT | Performed by: FAMILY MEDICINE

## 2020-08-10 PROCEDURE — 3078F DIAST BP <80 MM HG: CPT | Performed by: FAMILY MEDICINE

## 2020-08-10 RX ORDER — NAPROXEN 500 MG/1
TABLET ORAL
Qty: 30 TABLET | Refills: 0 | Status: SHIPPED | OUTPATIENT
Start: 2020-08-10 | End: 2020-11-25

## 2020-08-10 NOTE — PROGRESS NOTES
Patient presents with:  Elbow Pain       HPI:    Patient ID: Dl Meyers is a 45year old female here c/o left elbow pain for approx 6 weeks. Outer elbow. Pain localized to elbow. Pain with movement of forearm such as twisting.  No redness or swe Heartburn.  30 tablet 2     Allergies:No Known Allergies    HISTORY:  Past Medical History:   Diagnosis Date   • Abdominal distention    • Abdominal pain    • Anxiety state, unspecified    • Back pain    • Bad breath    • Belching    • Bloating    • Constip Maternal Grandmother    • Anxiety Maternal Grandmother         undiagnosed   • Cancer Maternal Grandfather    • Stroke Maternal Grandfather    • Lipids Maternal Grandfather    • Diabetes Maternal Grandfather    • Heart Attack Maternal Grandfather    • Diab has normal strength. No sensory deficit (grossly in tact grossly intact b/l UE). Reflex Scores:       Bicep reflexes are 2+ on the right side and 2+ on the left side.            ASSESSMENT/PLAN:   Left elbow tendonitis  (primary encounter diagnosis)  RICE

## 2020-08-27 ENCOUNTER — TELEPHONE (OUTPATIENT)
Dept: FAMILY MEDICINE CLINIC | Facility: CLINIC | Age: 45
End: 2020-08-27

## 2020-08-27 RX ORDER — LEVONORGESTREL AND ETHINYL ESTRADIOL 0.1-0.02MG
KIT ORAL
Qty: 84 TABLET | Refills: 0 | Status: SHIPPED | OUTPATIENT
Start: 2020-08-27 | End: 2020-11-19

## 2020-08-27 NOTE — TELEPHONE ENCOUNTER
Patient informed she is due for josé miguel  Order already in system  Patient states she will schedule tomorrow

## 2020-08-27 NOTE — TELEPHONE ENCOUNTER
Last OV: 6/18/19 with Dr. Noble Fuchs for annual  Last refill date: 6/1/20  Follow-up: 1 year  Next appt.: none scheduled; due 6/2020    Patient overdue for annual. Please contact her to schedule appt and then return to RN pool for refill.  Thank you

## 2020-08-27 NOTE — TELEPHONE ENCOUNTER
Future Appointments   Date Time Provider Venkata Guillaume   9/17/2020 11:40 AM SHELL Tran [facility]   11/2/2020  6:00 PM JESSICA Samuel [facility] N [facility]     Please refill BC until appt. Patient needed later appt.

## 2020-09-16 RX ORDER — PROPRANOLOL HYDROCHLORIDE 40 MG/1
TABLET ORAL
Qty: 90 TABLET | Refills: 0 | Status: SHIPPED | OUTPATIENT
Start: 2020-09-16 | End: 2020-12-28

## 2020-09-16 NOTE — TELEPHONE ENCOUNTER
Hypertension Medications Protocol Passed9/16 11:41 AM   CMP or BMP in past 12 months    Last serum creatinine< 2.0    Appointment in past 6 or next 3 months     Last refill 6/1/20 #30 2 refill  Last CMP 6/22/20  Last OV 8/10/20  Refill sent

## 2020-09-22 ENCOUNTER — HOSPITAL ENCOUNTER (OUTPATIENT)
Dept: MAMMOGRAPHY | Age: 45
Discharge: HOME | End: 2020-09-22
Attending: FAMILY MEDICINE
Payer: COMMERCIAL

## 2020-09-22 DIAGNOSIS — Z12.39 BREAST CANCER SCREENING: ICD-10-CM

## 2020-09-22 PROCEDURE — 77063 BREAST TOMOSYNTHESIS BI: CPT | Performed by: FAMILY MEDICINE

## 2020-09-22 PROCEDURE — 77067 SCR MAMMO BI INCL CAD: CPT | Performed by: FAMILY MEDICINE

## 2020-10-31 ENCOUNTER — NURSE ONLY (OUTPATIENT)
Dept: FAMILY MEDICINE CLINIC | Facility: CLINIC | Age: 45
End: 2020-10-31
Payer: COMMERCIAL

## 2020-10-31 DIAGNOSIS — E78.6 LOW HDL (UNDER 40): ICD-10-CM

## 2020-10-31 DIAGNOSIS — E78.1 HYPERTRIGLYCERIDEMIA: ICD-10-CM

## 2020-10-31 PROCEDURE — 80061 LIPID PANEL: CPT | Performed by: FAMILY MEDICINE

## 2020-10-31 PROCEDURE — 80053 COMPREHEN METABOLIC PANEL: CPT | Performed by: FAMILY MEDICINE

## 2020-11-04 RX ORDER — SIMVASTATIN 10 MG
TABLET ORAL
Qty: 30 TABLET | Refills: 2 | Status: SHIPPED | OUTPATIENT
Start: 2020-11-04 | End: 2020-11-09

## 2020-11-09 ENCOUNTER — TELEMEDICINE (OUTPATIENT)
Dept: FAMILY MEDICINE CLINIC | Facility: CLINIC | Age: 45
End: 2020-11-09
Payer: COMMERCIAL

## 2020-11-09 DIAGNOSIS — E78.5 HYPERLIPIDEMIA, UNSPECIFIED HYPERLIPIDEMIA TYPE: Primary | ICD-10-CM

## 2020-11-09 DIAGNOSIS — Z51.81 THERAPEUTIC DRUG MONITORING: ICD-10-CM

## 2020-11-09 PROCEDURE — 99213 OFFICE O/P EST LOW 20 MIN: CPT | Performed by: FAMILY MEDICINE

## 2020-11-09 RX ORDER — SIMVASTATIN 10 MG
10 TABLET ORAL DAILY
Qty: 30 TABLET | Refills: 5 | Status: SHIPPED | OUTPATIENT
Start: 2020-11-09 | End: 2021-08-03

## 2020-11-09 NOTE — PROGRESS NOTES
Patient presents with:  Medication Follow-Up  Lab        HPI:    Patient ID: Humphrey Brooks is a 45year old female doing video visit today for f/u on BW. Started on zocor few months ago. Lipid panel improved. Trig still high.  HDL not at goal.  No • Bloating    • Constipation    • Depression    • Dysmenorrhea    • Esophageal reflux    • Frequent use of laxatives    • History of depression    • History of stomach ulcers    • Indigestion    • Irregular bowel habits    • Loss of appetite    • Migrain Grandfather    • Diabetes Paternal Grandmother    • Depression Paternal Grandmother    • Depression Son    • Other (Other) Son         Duchennes muscular dystrophy   • Diabetes Paternal Grandfather    • Crohn's Disease Maternal Aunt    • Psychiatric Sister

## 2020-11-19 RX ORDER — LEVONORGESTREL AND ETHINYL ESTRADIOL 0.1-0.02MG
KIT ORAL
Qty: 84 TABLET | Refills: 0 | Status: SHIPPED | OUTPATIENT
Start: 2020-11-19 | End: 2020-12-16 | Stop reason: ALTCHOICE

## 2020-11-19 NOTE — TELEPHONE ENCOUNTER
Last OV: 6/18/19 with Dr. José Luis Vicente for annual  Last refill date: 8/27/20  Follow-up: 1 year  Next appt.: 11/25/20    Refill sent

## 2020-11-21 ENCOUNTER — HOSPITAL ENCOUNTER (OUTPATIENT)
Age: 45
Discharge: HOME | End: 2020-11-21
Payer: COMMERCIAL

## 2020-11-21 VITALS
TEMPERATURE: 98 F | HEART RATE: 80 BPM | RESPIRATION RATE: 16 BRPM | SYSTOLIC BLOOD PRESSURE: 147 MMHG | DIASTOLIC BLOOD PRESSURE: 95 MMHG | OXYGEN SATURATION: 99 %

## 2020-11-21 DIAGNOSIS — L02.211 CUTANEOUS ABSCESS OF ABDOMINAL WALL: Primary | ICD-10-CM

## 2020-11-21 PROCEDURE — 87186 SC STD MICRODIL/AGAR DIL: CPT | Performed by: PHYSICIAN ASSISTANT

## 2020-11-21 PROCEDURE — 87205 SMEAR GRAM STAIN: CPT | Performed by: PHYSICIAN ASSISTANT

## 2020-11-21 PROCEDURE — 99213 OFFICE O/P EST LOW 20 MIN: CPT | Performed by: PHYSICIAN ASSISTANT

## 2020-11-21 PROCEDURE — 10060 I&D ABSCESS SIMPLE/SINGLE: CPT | Performed by: PHYSICIAN ASSISTANT

## 2020-11-21 PROCEDURE — 87070 CULTURE OTHR SPECIMN AEROBIC: CPT | Performed by: PHYSICIAN ASSISTANT

## 2020-11-21 PROCEDURE — 87077 CULTURE AEROBIC IDENTIFY: CPT | Performed by: PHYSICIAN ASSISTANT

## 2020-11-21 RX ORDER — CEFADROXIL 500 MG/1
500 CAPSULE ORAL 2 TIMES DAILY
Qty: 14 CAPSULE | Refills: 0 | Status: SHIPPED | OUTPATIENT
Start: 2020-11-21 | End: 2020-11-28

## 2020-11-21 NOTE — ED INITIAL ASSESSMENT (HPI)
Pt states she has had a small abscess on her lower abd for a couple of weeks, has been soaking it and warm packs with no relief. Now has gotten bigger and more painful.

## 2020-11-21 NOTE — ED PROVIDER NOTES
Patient Seen in: [facility] [address] [place] [address]      History   Patient presents with:  Abscess    Stated Complaint: abd abcess    HPI    CHIEF COMPLAINT: Boil on the abdomen     HISTORY OF PRESENT ILLNESS: Patient is a 42-year-old female who presents for evaluati fullness after meals    • Weight gain    • Weight loss               Past Surgical History:   Procedure Laterality Date   • COLONOSCOPY     • COLPOSCOPY, CERVIX W/UPPER ADJACENT VAGINA; W/BIOPSY(S), CERVIX  2008    HPV+   • ESOPHAGOGASTRODUODENOSCOPY, POSS SpO2 99 %   O2 Device None (Room air)       Current:BP (!) 147/95   Pulse 80   Temp 97.7 °F (36.5 °C) (Temporal)   Resp 16   LMP 11/14/2020   SpO2 99%         Physical Exam    Vital signs and nursing notes reviewed  General Appearance: No acute distress Cap  Take 1 capsule (500 mg total) by mouth 2 (two) times daily for 7 days.   Qty: 14 capsule Refills: 0

## 2020-11-25 ENCOUNTER — TELEPHONE (OUTPATIENT)
Dept: OBGYN CLINIC | Facility: CLINIC | Age: 45
End: 2020-11-25

## 2020-11-25 ENCOUNTER — OFFICE VISIT (OUTPATIENT)
Dept: OBGYN CLINIC | Facility: CLINIC | Age: 45
End: 2020-11-25
Payer: COMMERCIAL

## 2020-11-25 VITALS
WEIGHT: 193 LBS | DIASTOLIC BLOOD PRESSURE: 70 MMHG | HEIGHT: 65.5 IN | BODY MASS INDEX: 31.77 KG/M2 | SYSTOLIC BLOOD PRESSURE: 128 MMHG

## 2020-11-25 DIAGNOSIS — Z30.41 ENCOUNTER FOR BIRTH CONTROL PILLS MAINTENANCE: ICD-10-CM

## 2020-11-25 DIAGNOSIS — Z23 NEED FOR VACCINATION: ICD-10-CM

## 2020-11-25 DIAGNOSIS — Z01.419 WELL WOMAN EXAM WITH ROUTINE GYNECOLOGICAL EXAM: Primary | ICD-10-CM

## 2020-11-25 PROCEDURE — 3008F BODY MASS INDEX DOCD: CPT | Performed by: OBSTETRICS & GYNECOLOGY

## 2020-11-25 PROCEDURE — 3074F SYST BP LT 130 MM HG: CPT | Performed by: OBSTETRICS & GYNECOLOGY

## 2020-11-25 PROCEDURE — 99072 ADDL SUPL MATRL&STAF TM PHE: CPT | Performed by: OBSTETRICS & GYNECOLOGY

## 2020-11-25 PROCEDURE — 90686 IIV4 VACC NO PRSV 0.5 ML IM: CPT | Performed by: OBSTETRICS & GYNECOLOGY

## 2020-11-25 PROCEDURE — 99396 PREV VISIT EST AGE 40-64: CPT | Performed by: OBSTETRICS & GYNECOLOGY

## 2020-11-25 PROCEDURE — 90471 IMMUNIZATION ADMIN: CPT | Performed by: OBSTETRICS & GYNECOLOGY

## 2020-11-25 PROCEDURE — 3078F DIAST BP <80 MM HG: CPT | Performed by: OBSTETRICS & GYNECOLOGY

## 2020-11-25 NOTE — TELEPHONE ENCOUNTER
Patient was seen today with Dr. Daniel Paz and she has an appt for Nexplanon implant procedure on Dec 16th at 10:30 am in [place]. Pt filled out the enrollment form and signed. Put in the nurse bin in [place] for nurse to check pt ins. Please notify pt.

## 2020-11-25 NOTE — PROGRESS NOTES
OB/GYN H&P       CC: Patient presents with:  Physical      HPI: Dl Meyers is a 45year old  here for WWE. Doing well. Noted new onset menstrual migraines for past 6 months. No change in OCPs. Takes OCPs for heavy cycles.    The migr HPV+   • ESOPHAGOGASTRODUODENOSCOPY, POSSIBLE BIOPSY, POSSIBLE POLYPECTOMY 06641 N/A 8/9/2016    Performed by Deborah Nichols MD at [address] [place] [address]   • ESOPHAGOGASTRODUODENOSCOPY, POSSIBLE BIOPSY, POSSIBLE POLYPECTOMY 25760 N/A 5/17/2016    Performed by , Rfl: No current facility-administered medications on file prior to visit.      Family History   Problem Relation Age of Onset   • Alcohol and Other Disorders Associated Father    • Homicide History Father         father strangled mother, patient age 12 Neck: Neck supple. No thyromegaly present. Cardiovascular: Normal rate. Pulmonary/Chest: Effort normal.   Abdominal: Soft. She exhibits no distension and no mass. There is no hepatosplenomegaly. There is no abdominal tenderness.  There is no rebound

## 2020-12-05 RX ORDER — SUMATRIPTAN 100 MG/1
TABLET, FILM COATED ORAL
Qty: 9 TABLET | Refills: 0 | Status: SHIPPED | OUTPATIENT
Start: 2020-12-05 | End: 2021-10-11

## 2020-12-05 NOTE — TELEPHONE ENCOUNTER
LOV: 11/9/20 for hyperlidemia    SUMAtriptan Succinate 100 MG Oral Tab 9 tablet 0 8/1/2020    Sig:   TAKE 1 TABLET BY MOUTH DAILY AS NEEDED FOR MIGRAINE, TAKE        Future Appointments   Date Time Provider Venkata Guillaume   12/16/2020 10:30 AM Anant Alcazar

## 2020-12-15 NOTE — TELEPHONE ENCOUNTER
Spoke to [facility] to follow up as benefit form was not received. Stated that patient is covered at 100% through medical and buy and bill. They faxed over form to [place] office as well. Patient has appt tomorrow.

## 2020-12-16 ENCOUNTER — OFFICE VISIT (OUTPATIENT)
Dept: OBGYN CLINIC | Facility: CLINIC | Age: 45
End: 2020-12-16
Payer: COMMERCIAL

## 2020-12-16 VITALS
HEIGHT: 65.5 IN | BODY MASS INDEX: 32.26 KG/M2 | WEIGHT: 196 LBS | DIASTOLIC BLOOD PRESSURE: 74 MMHG | SYSTOLIC BLOOD PRESSURE: 122 MMHG

## 2020-12-16 DIAGNOSIS — Z30.017 INSERTION OF IMPLANTABLE SUBDERMAL CONTRACEPTIVE: Primary | ICD-10-CM

## 2020-12-16 PROCEDURE — 11981 INSERTION DRUG DLVR IMPLANT: CPT | Performed by: OBSTETRICS & GYNECOLOGY

## 2020-12-16 PROCEDURE — 3074F SYST BP LT 130 MM HG: CPT | Performed by: OBSTETRICS & GYNECOLOGY

## 2020-12-16 PROCEDURE — 81025 URINE PREGNANCY TEST: CPT | Performed by: OBSTETRICS & GYNECOLOGY

## 2020-12-16 PROCEDURE — 3008F BODY MASS INDEX DOCD: CPT | Performed by: OBSTETRICS & GYNECOLOGY

## 2020-12-16 PROCEDURE — 3078F DIAST BP <80 MM HG: CPT | Performed by: OBSTETRICS & GYNECOLOGY

## 2020-12-16 NOTE — PROCEDURES
Procedure: Nexplanon Placement     Date of Procedure: 20    Pre-procedure diagnosis:  Encounter for contraception     Post-procedure diagnosis:   Encounter for contraception    Indications:   45year old female  who presents for Nexplanon plac and verified to be in the correct position. A sterile adhesive bandage was placed over the site of insertion. The patient was instructed on palpation of her Nexplanon implant. A pressure bandage with sterile guaze was then applied.  The patient was instruct

## 2020-12-28 RX ORDER — PROPRANOLOL HYDROCHLORIDE 40 MG/1
TABLET ORAL
Qty: 90 TABLET | Refills: 0 | Status: SHIPPED | OUTPATIENT
Start: 2020-12-28 | End: 2021-04-08

## 2020-12-28 NOTE — TELEPHONE ENCOUNTER
LOV 8/10/20 for tendonitis.   BP: 110/72    PROPRANOLOL HCL 40 MG Oral Tab 90 tablet 0 9/16/2020    Sig:   TAKE 1 TABLET BY MOUTH EVERY DAY     Route:   (none)       Future Appointments   Date Time Provider Venkata Guillaume   2/10/2021  4:40 PM Pamela Rubin

## 2021-01-12 ENCOUNTER — TELEMEDICINE (OUTPATIENT)
Dept: FAMILY MEDICINE CLINIC | Facility: CLINIC | Age: 46
End: 2021-01-12

## 2021-01-12 DIAGNOSIS — J01.01 ACUTE RECURRENT MAXILLARY SINUSITIS: Primary | ICD-10-CM

## 2021-01-12 PROCEDURE — 99213 OFFICE O/P EST LOW 20 MIN: CPT | Performed by: FAMILY MEDICINE

## 2021-01-12 RX ORDER — AZITHROMYCIN 250 MG/1
TABLET, FILM COATED ORAL
Qty: 6 TABLET | Refills: 0 | Status: SHIPPED | OUTPATIENT
Start: 2021-01-12 | End: 2021-01-17

## 2021-01-12 NOTE — PROGRESS NOTES
No chief complaint on file. Sinus pressure    This visit is conducted using Telemedicine with live, interactive video and audio.     Patient has been referred to the [facility] website at [email or web address] to review the yearly Consent to Treat document Allergies    HISTORY:  Past Medical History:   Diagnosis Date   • Abdominal distention    • Abdominal pain    • Anxiety state, unspecified    • Back pain    • Bad breath    • Belching    • Bloating    • Constipation    • Depression    • Dysmenorrhea    • E Grandmother         undiagnosed   • Cancer Maternal Grandfather    • Stroke Maternal Grandfather    • Lipids Maternal Grandfather    • Diabetes Maternal Grandfather    • Heart Attack Maternal Grandfather    • Diabetes Paternal Grandmother    • Depression P

## 2021-02-24 PROBLEM — F39 MOOD DISORDER (HCC): Status: ACTIVE | Noted: 2021-02-24

## 2021-02-24 PROBLEM — F39 MOOD DISORDER: Status: ACTIVE | Noted: 2021-02-24

## 2021-03-05 ENCOUNTER — HOSPITAL ENCOUNTER (OUTPATIENT)
Dept: GENERAL RADIOLOGY | Age: 46
Discharge: HOME | End: 2021-03-05
Attending: FAMILY MEDICINE
Payer: COMMERCIAL

## 2021-03-05 ENCOUNTER — OFFICE VISIT (OUTPATIENT)
Dept: FAMILY MEDICINE CLINIC | Facility: CLINIC | Age: 46
End: 2021-03-05
Payer: COMMERCIAL

## 2021-03-05 VITALS
WEIGHT: 197 LBS | OXYGEN SATURATION: 99 % | HEART RATE: 84 BPM | RESPIRATION RATE: 18 BRPM | DIASTOLIC BLOOD PRESSURE: 82 MMHG | SYSTOLIC BLOOD PRESSURE: 118 MMHG | TEMPERATURE: 98 F | BODY MASS INDEX: 32 KG/M2

## 2021-03-05 DIAGNOSIS — M77.11 LATERAL EPICONDYLITIS OF BOTH ELBOWS: ICD-10-CM

## 2021-03-05 DIAGNOSIS — G56.03 BILATERAL CARPAL TUNNEL SYNDROME: ICD-10-CM

## 2021-03-05 DIAGNOSIS — M77.12 LATERAL EPICONDYLITIS OF BOTH ELBOWS: ICD-10-CM

## 2021-03-05 DIAGNOSIS — M25.522 LEFT ELBOW PAIN: ICD-10-CM

## 2021-03-05 DIAGNOSIS — M77.12 LATERAL EPICONDYLITIS OF BOTH ELBOWS: Primary | ICD-10-CM

## 2021-03-05 DIAGNOSIS — M77.11 LATERAL EPICONDYLITIS OF BOTH ELBOWS: Primary | ICD-10-CM

## 2021-03-05 PROCEDURE — 3074F SYST BP LT 130 MM HG: CPT | Performed by: FAMILY MEDICINE

## 2021-03-05 PROCEDURE — 99214 OFFICE O/P EST MOD 30 MIN: CPT | Performed by: FAMILY MEDICINE

## 2021-03-05 PROCEDURE — 73080 X-RAY EXAM OF ELBOW: CPT | Performed by: FAMILY MEDICINE

## 2021-03-05 PROCEDURE — 3079F DIAST BP 80-89 MM HG: CPT | Performed by: FAMILY MEDICINE

## 2021-03-05 NOTE — PROGRESS NOTES
Patient presents with:  Arm Pain: B arms. L worse. Mild weakness as well. Symptoms x3 weeks. HPI:    Patient ID: Kemar Yang is a 39year old female. HPI     Left elbow pain since 2 wk.  She does use her arm a lot while picking up her laxatives    • History of depression    • History of stomach ulcers    • Indigestion    • Irregular bowel habits    • Loss of appetite    • Migraine    • Migraines    • Nausea    • Other and unspecified hyperlipidemia    • Pain with bowel movements    • Pr Other (Other) Son         Duchennes muscular dystrophy   • Diabetes Paternal Grandfather    • Crohn's Disease Maternal Aunt    • Psychiatric Sister         postpartum depression   • Depression Other         pat uncle   • Suicide History Other         pat u

## 2021-03-06 ENCOUNTER — TELEPHONE (OUTPATIENT)
Dept: FAMILY MEDICINE CLINIC | Facility: CLINIC | Age: 46
End: 2021-03-06

## 2021-04-08 RX ORDER — PROPRANOLOL HYDROCHLORIDE 40 MG/1
TABLET ORAL
Qty: 90 TABLET | Refills: 0 | Status: SHIPPED | OUTPATIENT
Start: 2021-04-08 | End: 2021-07-08

## 2021-04-08 NOTE — TELEPHONE ENCOUNTER
LOV: 3/5/21 for arm pain  CMP: 10/31/20    PROPRANOLOL HCL 40 MG Oral Tab 90 tablet 0 12/28/2020    Sig: Pat Alfred 1 TABLET BY MOUTH EVERY DAY       Hypertension Medications Protocol [id number]/08/2021 08:46 AM   CMP or BMP in past 12 months Protocol Details

## 2021-06-30 ENCOUNTER — OFFICE VISIT (OUTPATIENT)
Dept: FAMILY MEDICINE CLINIC | Facility: CLINIC | Age: 46
End: 2021-06-30
Payer: COMMERCIAL

## 2021-06-30 VITALS
HEIGHT: 66 IN | OXYGEN SATURATION: 98 % | TEMPERATURE: 98 F | RESPIRATION RATE: 18 BRPM | DIASTOLIC BLOOD PRESSURE: 84 MMHG | WEIGHT: 191 LBS | BODY MASS INDEX: 30.7 KG/M2 | SYSTOLIC BLOOD PRESSURE: 124 MMHG | HEART RATE: 77 BPM

## 2021-06-30 DIAGNOSIS — G43.109 MIGRAINE WITH AURA AND WITHOUT STATUS MIGRAINOSUS, NOT INTRACTABLE: ICD-10-CM

## 2021-06-30 DIAGNOSIS — Z12.31 BREAST CANCER SCREENING BY MAMMOGRAM: Primary | ICD-10-CM

## 2021-06-30 DIAGNOSIS — E55.9 VITAMIN D DEFICIENCY: ICD-10-CM

## 2021-06-30 DIAGNOSIS — Z51.81 THERAPEUTIC DRUG MONITORING: ICD-10-CM

## 2021-06-30 DIAGNOSIS — Z00.00 ANNUAL PHYSICAL EXAM: ICD-10-CM

## 2021-06-30 DIAGNOSIS — E78.5 HYPERLIPIDEMIA, UNSPECIFIED HYPERLIPIDEMIA TYPE: ICD-10-CM

## 2021-06-30 PROCEDURE — 3008F BODY MASS INDEX DOCD: CPT | Performed by: FAMILY MEDICINE

## 2021-06-30 PROCEDURE — 3074F SYST BP LT 130 MM HG: CPT | Performed by: FAMILY MEDICINE

## 2021-06-30 PROCEDURE — 99396 PREV VISIT EST AGE 40-64: CPT | Performed by: FAMILY MEDICINE

## 2021-06-30 PROCEDURE — 3079F DIAST BP 80-89 MM HG: CPT | Performed by: FAMILY MEDICINE

## 2021-06-30 RX ORDER — CHLORAL HYDRATE 500 MG
1000 CAPSULE ORAL DAILY
COMMUNITY

## 2021-06-30 RX ORDER — RIBOFLAVIN (VITAMIN B2) 100 MG
100 TABLET ORAL DAILY
COMMUNITY
End: 2021-09-29

## 2021-06-30 NOTE — PROGRESS NOTES
HPI:   Shona Winters is a 39year old female who presents for a complete physical exam.    Sinus pressure and nasal congestion on and off for > 1 year  More in AM. Drains nose in AM and in the shower.   Take Claritin prn and it helps    Bump on righ 10/31/2020    AST 17 06/22/2020    AST 16 12/09/2019     Lab Results   Component Value Date    ALT 30 10/31/2020    ALT 26 06/22/2020    ALT 22 12/09/2019       Current Outpatient Medications   Medication Sig Dispense Refill   • omega-3 fatty acids 1000 MG Uncomfortable fullness after meals    • Weight gain    • Weight loss       Past Surgical History:   Procedure Laterality Date   • COLONOSCOPY     • COLPOSCOPY, CERVIX W/UPPER ADJACENT VAGINA; W/BIOPSY(S), CERVIX  2008    HPV+   • FRACTURE SURGERY      repa date: 2008        Years since quittin.4      Smokeless tobacco: Never Used      Tobacco comment: non-smoker    Vaping Use      Vaping Use: Never used    Alcohol use:  Yes      Alcohol/week: 3.0 standard drinks      Types: 3 Standard drinks or equi CPM  Hyperlipidemia: recheck. Will refill med for 1 yr if BW wnl  Intermittent sinus congestion: start daily claritin and flonase x 6 weeks. Call back if sx not resolved.  Will eval further  Bump of leg: monitor for  Resolution over next few weeks  Pt' s we

## 2021-07-02 ENCOUNTER — LAB ENCOUNTER (OUTPATIENT)
Dept: LAB | Age: 46
End: 2021-07-02
Attending: FAMILY MEDICINE
Payer: COMMERCIAL

## 2021-07-02 DIAGNOSIS — E55.9 VITAMIN D DEFICIENCY: ICD-10-CM

## 2021-07-02 DIAGNOSIS — Z51.81 THERAPEUTIC DRUG MONITORING: ICD-10-CM

## 2021-07-02 DIAGNOSIS — Z00.00 ANNUAL PHYSICAL EXAM: ICD-10-CM

## 2021-07-02 DIAGNOSIS — E78.5 HYPERLIPIDEMIA, UNSPECIFIED HYPERLIPIDEMIA TYPE: ICD-10-CM

## 2021-07-02 LAB
ALBUMIN SERPL-MCNC: 4 G/DL (ref 3.4–5)
ALBUMIN/GLOB SERPL: 1.2 {RATIO} (ref 1–2)
ALP LIVER SERPL-CCNC: 96 U/L
ALT SERPL-CCNC: 36 U/L
ANION GAP SERPL CALC-SCNC: 8 MMOL/L (ref 0–18)
AST SERPL-CCNC: 21 U/L (ref 15–37)
BASOPHILS # BLD AUTO: 0.04 X10(3) UL (ref 0–0.2)
BASOPHILS NFR BLD AUTO: 0.4 %
BILIRUB SERPL-MCNC: 0.4 MG/DL (ref 0.1–2)
BUN BLD-MCNC: 15 MG/DL (ref 7–18)
BUN/CREAT SERPL: 18.8 (ref 10–20)
CALCIUM BLD-MCNC: 8.9 MG/DL (ref 8.5–10.1)
CHLORIDE SERPL-SCNC: 108 MMOL/L (ref 98–112)
CHOLEST SMN-MCNC: 142 MG/DL (ref ?–200)
CK SERPL-CCNC: 341 U/L
CO2 SERPL-SCNC: 24 MMOL/L (ref 21–32)
CREAT BLD-MCNC: 0.8 MG/DL
DEPRECATED RDW RBC AUTO: 46.2 FL (ref 35.1–46.3)
EOSINOPHIL # BLD AUTO: 0.17 X10(3) UL (ref 0–0.7)
EOSINOPHIL NFR BLD AUTO: 1.9 %
ERYTHROCYTE [DISTWIDTH] IN BLOOD BY AUTOMATED COUNT: 14.5 % (ref 11–15)
EST. AVERAGE GLUCOSE BLD GHB EST-MCNC: 105 MG/DL (ref 68–126)
GLOBULIN PLAS-MCNC: 3.3 G/DL (ref 2.8–4.4)
GLUCOSE BLD-MCNC: 112 MG/DL (ref 70–99)
HBA1C MFR BLD HPLC: 5.3 % (ref ?–5.7)
HCT VFR BLD AUTO: 38.7 %
HDLC SERPL-MCNC: 30 MG/DL (ref 40–59)
HGB BLD-MCNC: 12.7 G/DL
IMM GRANULOCYTES # BLD AUTO: 0.03 X10(3) UL (ref 0–1)
IMM GRANULOCYTES NFR BLD: 0.3 %
LDLC SERPL CALC-MCNC: 81 MG/DL (ref ?–100)
LYMPHOCYTES # BLD AUTO: 1.87 X10(3) UL (ref 1–4)
LYMPHOCYTES NFR BLD AUTO: 20.9 %
M PROTEIN MFR SERPL ELPH: 7.3 G/DL (ref 6.4–8.2)
MCH RBC QN AUTO: 28.7 PG (ref 26–34)
MCHC RBC AUTO-ENTMCNC: 32.8 G/DL (ref 31–37)
MCV RBC AUTO: 87.6 FL
MONOCYTES # BLD AUTO: 0.61 X10(3) UL (ref 0.1–1)
MONOCYTES NFR BLD AUTO: 6.8 %
NEUTROPHILS # BLD AUTO: 6.21 X10 (3) UL (ref 1.5–7.7)
NEUTROPHILS # BLD AUTO: 6.21 X10(3) UL (ref 1.5–7.7)
NEUTROPHILS NFR BLD AUTO: 69.7 %
NONHDLC SERPL-MCNC: 112 MG/DL (ref ?–130)
OSMOLALITY SERPL CALC.SUM OF ELEC: 292 MOSM/KG (ref 275–295)
PATIENT FASTING Y/N/NP: YES
PATIENT FASTING Y/N/NP: YES
PLATELET # BLD AUTO: 268 10(3)UL (ref 150–450)
POTASSIUM SERPL-SCNC: 4.1 MMOL/L (ref 3.5–5.1)
RBC # BLD AUTO: 4.42 X10(6)UL
SODIUM SERPL-SCNC: 140 MMOL/L (ref 136–145)
TRIGL SERPL-MCNC: 180 MG/DL (ref 30–149)
TSI SER-ACNC: 1.7 MIU/ML (ref 0.36–3.74)
VIT D+METAB SERPL-MCNC: 35.5 NG/ML (ref 30–100)
VLDLC SERPL CALC-MCNC: 28 MG/DL (ref 0–30)
WBC # BLD AUTO: 8.9 X10(3) UL (ref 4–11)

## 2021-07-02 PROCEDURE — 83036 HEMOGLOBIN GLYCOSYLATED A1C: CPT

## 2021-07-02 PROCEDURE — 36415 COLL VENOUS BLD VENIPUNCTURE: CPT

## 2021-07-02 PROCEDURE — 80053 COMPREHEN METABOLIC PANEL: CPT

## 2021-07-02 PROCEDURE — 84443 ASSAY THYROID STIM HORMONE: CPT

## 2021-07-02 PROCEDURE — 85025 COMPLETE CBC W/AUTO DIFF WBC: CPT

## 2021-07-02 PROCEDURE — 82550 ASSAY OF CK (CPK): CPT

## 2021-07-02 PROCEDURE — 82306 VITAMIN D 25 HYDROXY: CPT

## 2021-07-02 PROCEDURE — 80061 LIPID PANEL: CPT

## 2021-07-08 RX ORDER — PROPRANOLOL HYDROCHLORIDE 40 MG/1
TABLET ORAL
Qty: 90 TABLET | Refills: 0 | Status: SHIPPED | OUTPATIENT
Start: 2021-07-08 | End: 2021-10-04

## 2021-07-08 NOTE — TELEPHONE ENCOUNTER
LOV: 6/30/21 for well adult  CMP: 7/2/21      PROPRANOLOL HCL 40 MG Oral Tab 90 tablet 0 4/8/2021    Sig:   TAKE 1 TABLET BY MOUTH EVERY DAY           Hypertension Medications Protocol [id number]/07/2021 09:15 PM   CMP or BMP in past 12 months Protocol Detheena

## 2021-07-09 ENCOUNTER — TELEPHONE (OUTPATIENT)
Dept: FAMILY MEDICINE CLINIC | Facility: CLINIC | Age: 46
End: 2021-07-09

## 2021-07-09 DIAGNOSIS — R74.8 ELEVATED CK-MB LEVEL: Primary | ICD-10-CM

## 2021-07-09 NOTE — TELEPHONE ENCOUNTER
Patient advised and verbalized understanding. Patient will be out of town 4 weeks out -nurse appointment scheduled for first available date. Patient taking 4000 iu of vitamin d - patient wanted to know if you recommend she continue this dose.   Please adv

## 2021-07-09 NOTE — TELEPHONE ENCOUNTER
----- Message from Nadia Mast MD sent at 7/9/2021  2:43 AM CDT -----  Trig have improved. HDL still on lower side - continue omega 3 supplement. CK level elevated -this can indicate inflammation of muscles.  This is a possible side effects of the keon

## 2021-08-03 ENCOUNTER — HOSPITAL ENCOUNTER (OUTPATIENT)
Age: 46
Discharge: HOME | End: 2021-08-03
Payer: COMMERCIAL

## 2021-08-03 VITALS
BODY MASS INDEX: 30.05 KG/M2 | DIASTOLIC BLOOD PRESSURE: 77 MMHG | SYSTOLIC BLOOD PRESSURE: 119 MMHG | WEIGHT: 187 LBS | RESPIRATION RATE: 16 BRPM | TEMPERATURE: 98 F | OXYGEN SATURATION: 100 % | HEART RATE: 78 BPM | HEIGHT: 66 IN

## 2021-08-03 DIAGNOSIS — L02.219 ABSCESS, SUPRAPUBIC: Primary | ICD-10-CM

## 2021-08-03 PROCEDURE — 99203 OFFICE O/P NEW LOW 30 MIN: CPT | Performed by: PHYSICIAN ASSISTANT

## 2021-08-03 PROCEDURE — 10060 I&D ABSCESS SIMPLE/SINGLE: CPT | Performed by: PHYSICIAN ASSISTANT

## 2021-08-03 RX ORDER — CEPHALEXIN 500 MG/1
500 CAPSULE ORAL 4 TIMES DAILY
Qty: 28 CAPSULE | Refills: 0 | Status: SHIPPED | OUTPATIENT
Start: 2021-08-03 | End: 2021-08-10

## 2021-08-03 RX ORDER — SIMVASTATIN 10 MG
TABLET ORAL
Qty: 90 TABLET | Refills: 0 | Status: SHIPPED | OUTPATIENT
Start: 2021-08-03 | End: 2021-11-15

## 2021-08-03 NOTE — TELEPHONE ENCOUNTER
Cholesterol Medication Protocol Etpntm1008/03/2021 09:04 AM   ALT < 80 Protocol Details    ALT resulted within past year     Lipid panel within past 12 months     Appointment within past 12 or next 3 months      OV 6/30/21  Refill 11/9/20 #30 5 refill

## 2021-08-04 NOTE — ED PROVIDER NOTES
Patient Seen in: [facility] [address] [place] [address]      History   Patient presents with:  Abscess    Stated Complaint: abcess needs to be drain    HPI/Subjective:   HPI    17-year-old female with known history of hidradenitis suppurativa comes in today with a right [id number];  Surgeon: Nancy Perez MD;  Location: [facility]                Social History    Tobacco Use      Smoking status: Former Smoker        Packs/day: 1.00        Years: 18.00        Pack years: 25        Quit date: 1/23/2008        Years sin induration underneath  Musculoskeletal:         General: No tenderness. Normal range of motion. Cervical back: Normal range of motion. Lymphadenopathy:      Cervical: No cervical adenopathy. Lower Body: No right inguinal adenopathy.  No left ing pm    Follow-up:  Garry Kaba, [address] [facility] [address]  [phone]    Schedule an appointment as soon as possible for a visit in 2 days            Medications Prescribed:  Current Discharge Medication List    START taking these medicat

## 2021-08-21 ENCOUNTER — NURSE ONLY (OUTPATIENT)
Dept: FAMILY MEDICINE CLINIC | Facility: CLINIC | Age: 46
End: 2021-08-21
Payer: COMMERCIAL

## 2021-08-21 DIAGNOSIS — R74.8 ELEVATED CK-MB LEVEL: ICD-10-CM

## 2021-08-21 LAB — CK SERPL-CCNC: 257 U/L

## 2021-08-21 PROCEDURE — 82550 ASSAY OF CK (CPK): CPT | Performed by: FAMILY MEDICINE

## 2021-08-24 ENCOUNTER — TELEPHONE (OUTPATIENT)
Dept: FAMILY MEDICINE CLINIC | Facility: CLINIC | Age: 46
End: 2021-08-24

## 2021-08-24 NOTE — TELEPHONE ENCOUNTER
----- Message from Jono Rice MD sent at 8/23/2021 11:27 PM CDT -----  CK level still higher than normal but has come down.   As long as she is not experiencing any myalgias, will continue current management and monitoring

## 2021-08-25 NOTE — TELEPHONE ENCOUNTER
Pt notified and verbalized understanding. 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 She is wondering if this would cause her levels to be elevated?     She states she does not have an

## 2021-09-01 NOTE — TELEPHONE ENCOUNTER
Spoke with patient. Since she is asymptomatic, will continue current medications and monitor CK levels.

## 2021-10-04 RX ORDER — PROPRANOLOL HYDROCHLORIDE 40 MG/1
TABLET ORAL
Qty: 90 TABLET | Refills: 0 | Status: SHIPPED | OUTPATIENT
Start: 2021-10-04 | End: 2022-01-17

## 2021-10-04 NOTE — TELEPHONE ENCOUNTER
LOV 6/30/21 for an annual exam.    Hypertension Medications Protocol Passed 10/03/2021 08:11 PM   Protocol Details  CMP or BMP in past 12 months    Last serum creatinine< 2.0    Appointment in past 6 or next 3 months     Rx filled.   Future Appointments   D

## 2021-10-11 RX ORDER — SUMATRIPTAN 100 MG/1
TABLET, FILM COATED ORAL
Qty: 9 TABLET | Refills: 0 | Status: SHIPPED | OUTPATIENT
Start: 2021-10-11

## 2021-10-11 NOTE — TELEPHONE ENCOUNTER
LOV 6/30/21 for a well adult.     SUMAtriptan Succinate 100 MG Oral Tab 9 tablet 0 12/5/2020    Sig: Abdias Pink 1 TABLET BY MOUTH WITH 30 MINUTES OF ONSET OF HEADACHE     Route:   (none)       Future Appointments   Date Time Provider Venkata Guillaume   10/25/2

## 2021-10-30 ENCOUNTER — HOSPITAL ENCOUNTER (OUTPATIENT)
Dept: MAMMOGRAPHY | Age: 46
Discharge: HOME | End: 2021-10-30
Attending: FAMILY MEDICINE
Payer: COMMERCIAL

## 2021-10-30 DIAGNOSIS — Z12.31 BREAST CANCER SCREENING BY MAMMOGRAM: ICD-10-CM

## 2021-10-30 PROCEDURE — 77067 SCR MAMMO BI INCL CAD: CPT | Performed by: FAMILY MEDICINE

## 2021-10-30 PROCEDURE — 77063 BREAST TOMOSYNTHESIS BI: CPT | Performed by: FAMILY MEDICINE

## 2021-11-12 ENCOUNTER — TELEPHONE (OUTPATIENT)
Dept: FAMILY MEDICINE CLINIC | Facility: CLINIC | Age: 46
End: 2021-11-12

## 2021-11-12 NOTE — TELEPHONE ENCOUNTER
Pt is having surgery to remove a bunion on right foot on 12/01/2021    Dr. Anand Rogel at [facility]     per pt has to have it done by the 23 rd of November     Ph. [phone]  Fax. [phone]

## 2021-11-12 NOTE — TELEPHONE ENCOUNTER
Spoke with pt. OV scheduled.     Future Appointments   Date Time Provider Venkata Aundrea   11/24/2021 10:00 AM Darlin Watson MD [facility] [place]   12/20/2021  5:20 PM Kem Loco, [facility]        She will call surgeon and verify [facility]

## 2021-11-15 RX ORDER — SIMVASTATIN 10 MG
TABLET ORAL
Qty: 90 TABLET | Refills: 0 | Status: SHIPPED | OUTPATIENT
Start: 2021-11-15 | End: 2022-01-17

## 2021-11-15 NOTE — TELEPHONE ENCOUNTER
LOV 6/30/21 for a well exam.    Cholesterol Medication Protocol Passed 11/14/2021 01:21 PM   Protocol Details  ALT < 80    ALT resulted within past year    Lipid panel within past 12 months    Appointment within past 12 or next 3 months     Future Appointm

## 2021-11-22 ENCOUNTER — OFFICE VISIT (OUTPATIENT)
Dept: FAMILY MEDICINE CLINIC | Facility: CLINIC | Age: 46
End: 2021-11-22
Payer: COMMERCIAL

## 2021-11-22 VITALS
HEIGHT: 66 IN | BODY MASS INDEX: 31.02 KG/M2 | RESPIRATION RATE: 16 BRPM | HEART RATE: 74 BPM | OXYGEN SATURATION: 99 % | DIASTOLIC BLOOD PRESSURE: 74 MMHG | SYSTOLIC BLOOD PRESSURE: 118 MMHG | TEMPERATURE: 98 F | WEIGHT: 193 LBS

## 2021-11-22 DIAGNOSIS — Z01.818 PRE-OP EXAMINATION: ICD-10-CM

## 2021-11-22 DIAGNOSIS — M21.611 BUNION, RIGHT FOOT: Primary | ICD-10-CM

## 2021-11-22 PROCEDURE — 99214 OFFICE O/P EST MOD 30 MIN: CPT | Performed by: FAMILY MEDICINE

## 2021-11-22 PROCEDURE — 3008F BODY MASS INDEX DOCD: CPT | Performed by: FAMILY MEDICINE

## 2021-11-22 PROCEDURE — 3078F DIAST BP <80 MM HG: CPT | Performed by: FAMILY MEDICINE

## 2021-11-22 PROCEDURE — 3074F SYST BP LT 130 MM HG: CPT | Performed by: FAMILY MEDICINE

## 2021-11-22 NOTE — PROGRESS NOTES
Bárbara Anders is a 39year old female who presents for a pre-operative physical exam.  Patient is scheduled for right foot bunion removal by Dr. Kathryn Zuniga on 12/1/21 at [facility].      HPI:   No concerns today.     Current Outpatient Medications and unspecified hyperlipidemia    • Pain with bowel movements    • Problems with swallowing    • Sleep disturbance    • Stress    • Uncomfortable fullness after meals    • Weight gain    • Weight loss       Past Surgical History:   Procedure Laterality Ramin Social History:   Social History    Tobacco Use      Smoking status: Former Smoker        Packs/day: 1.00        Years: 18.00        Pack years: 25        Quit date: 2008        Years since quittin.8      Smokeless tobacco: Never Used      T

## 2021-11-23 ENCOUNTER — TELEPHONE (OUTPATIENT)
Dept: FAMILY MEDICINE CLINIC | Facility: CLINIC | Age: 46
End: 2021-11-23

## 2021-11-23 DIAGNOSIS — Z01.818 PRE-OP EXAMINATION: Primary | ICD-10-CM

## 2021-11-23 NOTE — TELEPHONE ENCOUNTER
Patient advised. Verbalized understanding.    Future Appointments   Date Time Provider Venkata Guillaume   11/24/2021 11:00 AM EMG OSWEGO NURSE [facility] [place]   12/20/2021  5:20 PM Shanelle Ozuna. 15

## 2021-11-23 NOTE — TELEPHONE ENCOUNTER
----- Message from Beverly Meyers MD sent at 11/23/2021  1:01 AM CST -----  Fax note to Dr. Rakesh Lewis office  Carolina Dia

## 2021-11-23 NOTE — TELEPHONE ENCOUNTER
Jordin Alvarado @ Dr Nava's office called, pt still needs labs done,  CBC with differential, and CMP   When pt talked to Dr Marii Elizabeth Dr was going put in the orders. Jordin Alvarado is aware no Dr  is not in the office today. Pt's procedure is dec 1.    Ph. [phone]

## 2021-11-24 ENCOUNTER — NURSE ONLY (OUTPATIENT)
Dept: FAMILY MEDICINE CLINIC | Facility: CLINIC | Age: 46
End: 2021-11-24
Payer: COMMERCIAL

## 2021-11-24 DIAGNOSIS — Z01.818 PRE-OP EXAMINATION: ICD-10-CM

## 2021-11-24 PROCEDURE — 80053 COMPREHEN METABOLIC PANEL: CPT | Performed by: FAMILY MEDICINE

## 2021-11-24 PROCEDURE — 85025 COMPLETE CBC W/AUTO DIFF WBC: CPT | Performed by: FAMILY MEDICINE

## 2021-11-24 NOTE — PROGRESS NOTES
Christie Barker presents today for nurse visit. Labs ordered by Dr Kelsey Aggarwal. 1 light green, 1 lavender drawn from right ac area with 1 attempt with straight needle. Patient tolerated well. Left office in stable condition.

## 2021-11-29 ENCOUNTER — TELEPHONE (OUTPATIENT)
Dept: FAMILY MEDICINE CLINIC | Facility: CLINIC | Age: 46
End: 2021-11-29

## 2021-11-29 NOTE — TELEPHONE ENCOUNTER
Dr. Yari wilder office called requesting labs results for pt as she is scheduled to go in on Wednesday.    Fax to [phone]

## 2021-11-30 ENCOUNTER — TELEPHONE (OUTPATIENT)
Dept: FAMILY MEDICINE CLINIC | Facility: CLINIC | Age: 46
End: 2021-11-30

## 2021-11-30 DIAGNOSIS — R79.89 CREATININE ELEVATION: Primary | ICD-10-CM

## 2021-11-30 NOTE — TELEPHONE ENCOUNTER
----- Message from Thalia Albarran MD sent at 11/30/2021  3:38 AM CST -----  Creatinine slightly high.   Recommend repeating in 4 weeks with nurse appointment  Rest of labs within normal limits

## 2021-12-29 ENCOUNTER — TELEMEDICINE (OUTPATIENT)
Dept: FAMILY MEDICINE CLINIC | Facility: CLINIC | Age: 46
End: 2021-12-29

## 2021-12-29 DIAGNOSIS — U07.1 COVID-19: Primary | ICD-10-CM

## 2021-12-29 PROCEDURE — 99213 OFFICE O/P EST LOW 20 MIN: CPT | Performed by: FAMILY MEDICINE

## 2021-12-29 RX ORDER — CODEINE PHOSPHATE AND GUAIFENESIN 10; 100 MG/5ML; MG/5ML
5 SOLUTION ORAL EVERY 6 HOURS PRN
Qty: 118 ML | Refills: 0 | Status: SHIPPED | OUTPATIENT
Start: 2021-12-29 | End: 2022-01-05

## 2021-12-29 NOTE — PROGRESS NOTES
No chief complaint on file. Covid follow-up  This visit is conducted using Telemedicine with live, interactive video and audio. Patient has been referred to the [facility] website at [email or web address] to review the yearly Consent to Treat document. HEADACHE 9 tablet 0   • PROPRANOLOL 40 MG Oral Tab TAKE 1 TABLET BY MOUTH EVERY DAY 90 tablet 0   • Ascorbic Acid (VITAMIN C) 250 MG Oral Chew Tab Chew by mouth. • Cholecalciferol (HM VITAMIN D3) 100 MCG (4000 UT) Oral Cap Take by mouth.      • omega-3 Location: [facility]      Family History   Problem Relation Age of Onset   • Alcohol and Other Disorders Associated Father    • Homicide History Father         father strangled mother, patient age 12   • Cancer Maternal Grandmother         lung, brain Solution; Take 5 mL by mouth every 6 (six) hours as needed for cough. Dispense: 118 mL; Refill: 0             No follow-ups on file.

## 2022-01-17 RX ORDER — SIMVASTATIN 10 MG
TABLET ORAL
Qty: 90 TABLET | Refills: 0 | OUTPATIENT
Start: 2022-01-17

## 2022-01-17 RX ORDER — SIMVASTATIN 10 MG
TABLET ORAL
Qty: 90 TABLET | Refills: 0 | Status: SHIPPED | OUTPATIENT
Start: 2022-01-17

## 2022-01-17 RX ORDER — PROPRANOLOL HYDROCHLORIDE 40 MG/1
TABLET ORAL
Qty: 90 TABLET | Refills: 0 | Status: SHIPPED | OUTPATIENT
Start: 2022-01-17

## 2022-01-17 NOTE — TELEPHONE ENCOUNTER
SIMVASTATIN 10 MG Oral Tab 90 tablet 0 1/17/2022     Sig: TAKE 1 TABLET(10 MG) BY MOUTH DAILY    Sent to pharmacy as: Simvastatin 10 MG Oral Tablet (ZOCOR)    E-Prescribing Status: Receipt confirmed by pharmacy (1/17/2022  1:17 PM CST)      Rx already sent

## 2022-01-17 NOTE — TELEPHONE ENCOUNTER
LOV: 12/29/21 for COVID - tele-visit    PROPRANOLOL 40 MG Oral Tab 90 tablet 0 10/4/2021    Sig: Suzy He 1 TABLET BY MOUTH EVERY DAY          Hypertension Medications Protocol Passed 01/16/2022 10:58 AM   Protocol Details  CMP or BMP in past 12 months    La

## 2022-01-17 NOTE — TELEPHONE ENCOUNTER
LOV 11/22/21 for a pre-op.     Cholesterol Medication Protocol Passed 01/17/2022 01:10 PM   Protocol Details  ALT < 80    ALT resulted within past year    Lipid panel within past 12 months    Appointment within past 12 or next 3 months     Future Appointmen

## 2022-01-22 ENCOUNTER — NURSE ONLY (OUTPATIENT)
Dept: FAMILY MEDICINE CLINIC | Facility: CLINIC | Age: 47
End: 2022-01-22
Payer: COMMERCIAL

## 2022-01-22 DIAGNOSIS — R79.89 CREATININE ELEVATION: ICD-10-CM

## 2022-01-22 LAB
ALBUMIN SERPL-MCNC: 4.4 G/DL (ref 3.4–5)
ALBUMIN/GLOB SERPL: 1.4 {RATIO} (ref 1–2)
ALP LIVER SERPL-CCNC: 102 U/L
ALT SERPL-CCNC: 43 U/L
ANION GAP SERPL CALC-SCNC: 7 MMOL/L (ref 0–18)
AST SERPL-CCNC: 22 U/L (ref 15–37)
BILIRUB SERPL-MCNC: 0.4 MG/DL (ref 0.1–2)
BUN BLD-MCNC: 14 MG/DL (ref 7–18)
CALCIUM BLD-MCNC: 9.4 MG/DL (ref 8.5–10.1)
CHLORIDE SERPL-SCNC: 104 MMOL/L (ref 98–112)
CO2 SERPL-SCNC: 29 MMOL/L (ref 21–32)
CREAT BLD-MCNC: 1.02 MG/DL
FASTING STATUS PATIENT QL REPORTED: YES
GLOBULIN PLAS-MCNC: 3.1 G/DL (ref 2.8–4.4)
GLUCOSE BLD-MCNC: 97 MG/DL (ref 70–99)
OSMOLALITY SERPL CALC.SUM OF ELEC: 290 MOSM/KG (ref 275–295)
POTASSIUM SERPL-SCNC: 4.1 MMOL/L (ref 3.5–5.1)
PROT SERPL-MCNC: 7.5 G/DL (ref 6.4–8.2)
SODIUM SERPL-SCNC: 140 MMOL/L (ref 136–145)

## 2022-01-22 PROCEDURE — 80053 COMPREHEN METABOLIC PANEL: CPT | Performed by: FAMILY MEDICINE

## 2022-02-21 RX ORDER — SIMVASTATIN 10 MG
TABLET ORAL
Qty: 30 TABLET | Refills: 5 | Status: SHIPPED | OUTPATIENT
Start: 2022-02-21

## 2022-02-21 NOTE — TELEPHONE ENCOUNTER
Last refilled on 1/17/22 for # 90 with 0 refills  Last OV 12/29/21 televisit  Future Appointments   Date Time Provider Venkata Guillaume   2/21/2022  3:40 PM Letiica Reyes DO [facility]   2/28/2022  5:40 PM Frances Sanchez, APRN [address] [facility]        Thank you.

## 2022-04-08 RX ORDER — SUMATRIPTAN 100 MG/1
TABLET, FILM COATED ORAL
Qty: 9 TABLET | Refills: 0 | Status: SHIPPED | OUTPATIENT
Start: 2022-04-08

## 2022-04-08 NOTE — TELEPHONE ENCOUNTER
Last refilled on 10/11/21  for # 9  with 0 rf. Last seen on 12/29/21 (covid-19 follow up ) telemedicine. Future Appointments   Date Time Provider Venkata Guillaume   5/23/2022  6:20 PM [place] Mayes [facility] [place]    rx pended. Please advise. Thank you.

## 2022-04-15 ENCOUNTER — APPOINTMENT (OUTPATIENT)
Dept: GENERAL RADIOLOGY | Age: 47
End: 2022-04-15
Attending: NURSE PRACTITIONER
Payer: COMMERCIAL

## 2022-04-15 ENCOUNTER — HOSPITAL ENCOUNTER (OUTPATIENT)
Age: 47
Discharge: HOME | End: 2022-04-15
Payer: COMMERCIAL

## 2022-04-15 VITALS
RESPIRATION RATE: 18 BRPM | HEIGHT: 66 IN | WEIGHT: 184 LBS | SYSTOLIC BLOOD PRESSURE: 106 MMHG | BODY MASS INDEX: 29.57 KG/M2 | DIASTOLIC BLOOD PRESSURE: 69 MMHG | HEART RATE: 93 BPM | TEMPERATURE: 98 F | OXYGEN SATURATION: 98 %

## 2022-04-15 DIAGNOSIS — S61.552A DOG BITE OF LEFT WRIST, INITIAL ENCOUNTER: Primary | ICD-10-CM
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
## 2022-04-15 PROCEDURE — 73110 X-RAY EXAM OF WRIST: CPT | Performed by: NURSE PRACTITIONER

## 2022-04-15 PROCEDURE — 99203 OFFICE O/P NEW LOW 30 MIN: CPT | Performed by: NURSE PRACTITIONER

## 2022-04-15 RX ORDER — PROPRANOLOL HYDROCHLORIDE 40 MG/1
TABLET ORAL
Qty: 90 TABLET | Refills: 0 | Status: SHIPPED | OUTPATIENT
Start: 2022-04-15

## 2022-04-15 RX ORDER — AMOXICILLIN AND CLAVULANATE POTASSIUM 875; 125 MG/1; MG/1
1 TABLET, FILM COATED ORAL 2 TIMES DAILY
Qty: 20 TABLET | Refills: 0 | Status: SHIPPED | OUTPATIENT
Start: 2022-04-15 | End: 2022-04-25

## 2022-04-15 NOTE — TELEPHONE ENCOUNTER
LOV:  12/29/21 for COVID    PROPRANOLOL 40 MG Oral Tab 90 tablet 0 1/17/2022    Sig: Delia Gilford 1 TABLET BY MOUTH EVERY DAY         Hypertension Medications Protocol Passed 04/14/2022 06:58 PM   Protocol Details  CMP or BMP in past 12 months    Last serum creatinine< 2.0    Appointment in past 6 or next 3 months     Future Appointments   Date Time Provider Venkata Guillaume   5/23/2022  6:20 PM Hubert Sunregina. 15

## 2022-04-15 NOTE — ED INITIAL ASSESSMENT (HPI)
Pt sts this afternoon tried to help her dog - Lab- that she though was choking. Pt out her left hand in the dogs mouth and it bit down as it was having a seizure. 1 lac to base of palm, 1 lac to dorsal aspect of left wrist. Last tetanus 2018. Pt is right handed.

## 2022-05-16 ENCOUNTER — TELEMEDICINE (OUTPATIENT)
Dept: FAMILY MEDICINE CLINIC | Facility: CLINIC | Age: 47
End: 2022-05-16

## 2022-05-16 DIAGNOSIS — J01.00 ACUTE NON-RECURRENT MAXILLARY SINUSITIS: Primary | ICD-10-CM

## 2022-05-16 RX ORDER — AZITHROMYCIN 250 MG/1
TABLET, FILM COATED ORAL
Qty: 6 TABLET | Refills: 0 | Status: SHIPPED | OUTPATIENT
Start: 2022-05-16 | End: 2022-05-21

## 2022-06-22 ENCOUNTER — OFFICE VISIT (OUTPATIENT)
Dept: FAMILY MEDICINE CLINIC | Facility: CLINIC | Age: 47
End: 2022-06-22
Payer: COMMERCIAL

## 2022-06-22 VITALS
HEART RATE: 98 BPM | SYSTOLIC BLOOD PRESSURE: 120 MMHG | BODY MASS INDEX: 29.57 KG/M2 | DIASTOLIC BLOOD PRESSURE: 80 MMHG | OXYGEN SATURATION: 98 % | HEIGHT: 66 IN | WEIGHT: 184 LBS | TEMPERATURE: 98 F | RESPIRATION RATE: 18 BRPM

## 2022-06-22 DIAGNOSIS — E55.9 VITAMIN D DEFICIENCY: ICD-10-CM

## 2022-06-22 DIAGNOSIS — S86.912A KNEE STRAIN, LEFT, INITIAL ENCOUNTER: ICD-10-CM

## 2022-06-22 DIAGNOSIS — Z12.31 ENCOUNTER FOR SCREENING MAMMOGRAM FOR MALIGNANT NEOPLASM OF BREAST: ICD-10-CM

## 2022-06-22 DIAGNOSIS — G43.109 MIGRAINE WITH AURA AND WITHOUT STATUS MIGRAINOSUS, NOT INTRACTABLE: ICD-10-CM

## 2022-06-22 DIAGNOSIS — Z00.00 ANNUAL PHYSICAL EXAM: Primary | ICD-10-CM

## 2022-06-22 PROCEDURE — 3079F DIAST BP 80-89 MM HG: CPT | Performed by: FAMILY MEDICINE

## 2022-06-22 PROCEDURE — 3008F BODY MASS INDEX DOCD: CPT | Performed by: FAMILY MEDICINE

## 2022-06-22 PROCEDURE — 3074F SYST BP LT 130 MM HG: CPT | Performed by: FAMILY MEDICINE

## 2022-06-22 PROCEDURE — 99396 PREV VISIT EST AGE 40-64: CPT | Performed by: FAMILY MEDICINE

## 2022-07-19 ENCOUNTER — LAB ENCOUNTER (OUTPATIENT)
Dept: LAB | Age: 47
End: 2022-07-19
Attending: INTERNAL MEDICINE
Payer: COMMERCIAL

## 2022-07-19 DIAGNOSIS — E55.9 VITAMIN D DEFICIENCY: ICD-10-CM

## 2022-07-19 DIAGNOSIS — Z00.00 ANNUAL PHYSICAL EXAM: ICD-10-CM

## 2022-07-19 DIAGNOSIS — R74.8 ELEVATED ALKALINE PHOSPHATASE LEVEL: ICD-10-CM

## 2022-07-19 LAB
ALBUMIN SERPL-MCNC: 4 G/DL (ref 3.4–5)
ALBUMIN/GLOB SERPL: 1.2 {RATIO} (ref 1–2)
ALP LIVER SERPL-CCNC: 93 U/L
ALT SERPL-CCNC: 25 U/L
ANION GAP SERPL CALC-SCNC: 5 MMOL/L (ref 0–18)
AST SERPL-CCNC: 21 U/L (ref 15–37)
BASOPHILS # BLD AUTO: 0.03 X10(3) UL (ref 0–0.2)
BASOPHILS NFR BLD AUTO: 0.4 %
BILIRUB SERPL-MCNC: 0.4 MG/DL (ref 0.1–2)
BUN BLD-MCNC: 16 MG/DL (ref 7–18)
CALCIUM BLD-MCNC: 9.4 MG/DL (ref 8.5–10.1)
CHLORIDE SERPL-SCNC: 107 MMOL/L (ref 98–112)
CHOLEST SERPL-MCNC: 163 MG/DL (ref ?–200)
CO2 SERPL-SCNC: 29 MMOL/L (ref 21–32)
CREAT BLD-MCNC: 0.97 MG/DL
EOSINOPHIL # BLD AUTO: 0.2 X10(3) UL (ref 0–0.7)
EOSINOPHIL NFR BLD AUTO: 2.9 %
ERYTHROCYTE [DISTWIDTH] IN BLOOD BY AUTOMATED COUNT: 14 %
EST. AVERAGE GLUCOSE BLD GHB EST-MCNC: 105 MG/DL (ref 68–126)
FASTING PATIENT LIPID ANSWER: YES
FASTING STATUS PATIENT QL REPORTED: YES
GLOBULIN PLAS-MCNC: 3.3 G/DL (ref 2.8–4.4)
GLUCOSE BLD-MCNC: 111 MG/DL (ref 70–99)
HAV AB SER QL IA: NONREACTIVE
HBA1C MFR BLD: 5.3 % (ref ?–5.7)
HBV CORE AB SERPL QL IA: NONREACTIVE
HBV SURFACE AB SER QL: NONREACTIVE
HBV SURFACE AB SERPL IA-ACNC: <3.1 MIU/ML
HBV SURFACE AG SER-ACNC: <0.1 [IU]/L
HBV SURFACE AG SERPL QL IA: NONREACTIVE
HCT VFR BLD AUTO: 39.3 %
HCV AB SERPL QL IA: NONREACTIVE
HDLC SERPL-MCNC: 32 MG/DL (ref 40–59)
HGB BLD-MCNC: 12.6 G/DL
IMM GRANULOCYTES # BLD AUTO: 0.02 X10(3) UL (ref 0–1)
IMM GRANULOCYTES NFR BLD: 0.3 %
LDLC SERPL CALC-MCNC: 99 MG/DL (ref ?–100)
LYMPHOCYTES # BLD AUTO: 1.52 X10(3) UL (ref 1–4)
LYMPHOCYTES NFR BLD AUTO: 22.1 %
MCH RBC QN AUTO: 28.5 PG (ref 26–34)
MCHC RBC AUTO-ENTMCNC: 32.1 G/DL (ref 31–37)
MCV RBC AUTO: 88.9 FL
MONOCYTES # BLD AUTO: 0.38 X10(3) UL (ref 0.1–1)
MONOCYTES NFR BLD AUTO: 5.5 %
NEUTROPHILS # BLD AUTO: 4.72 X10 (3) UL (ref 1.5–7.7)
NEUTROPHILS # BLD AUTO: 4.72 X10(3) UL (ref 1.5–7.7)
NEUTROPHILS NFR BLD AUTO: 68.8 %
NONHDLC SERPL-MCNC: 131 MG/DL (ref ?–130)
OSMOLALITY SERPL CALC.SUM OF ELEC: 294 MOSM/KG (ref 275–295)
PLATELET # BLD AUTO: 264 10(3)UL (ref 150–450)
POTASSIUM SERPL-SCNC: 4.2 MMOL/L (ref 3.5–5.1)
PROT SERPL-MCNC: 7.3 G/DL (ref 6.4–8.2)
RBC # BLD AUTO: 4.42 X10(6)UL
SODIUM SERPL-SCNC: 141 MMOL/L (ref 136–145)
TRIGL SERPL-MCNC: 181 MG/DL (ref 30–149)
TSI SER-ACNC: 1.85 MIU/ML (ref 0.36–3.74)
VIT D+METAB SERPL-MCNC: 49.5 NG/ML (ref 30–100)
VLDLC SERPL CALC-MCNC: 30 MG/DL (ref 0–30)
WBC # BLD AUTO: 6.9 X10(3) UL (ref 4–11)

## 2022-07-19 PROCEDURE — 86706 HEP B SURFACE ANTIBODY: CPT

## 2022-07-19 PROCEDURE — 82306 VITAMIN D 25 HYDROXY: CPT

## 2022-07-19 PROCEDURE — 87340 HEPATITIS B SURFACE AG IA: CPT

## 2022-07-19 PROCEDURE — 84443 ASSAY THYROID STIM HORMONE: CPT

## 2022-07-19 PROCEDURE — 80053 COMPREHEN METABOLIC PANEL: CPT

## 2022-07-19 PROCEDURE — 83036 HEMOGLOBIN GLYCOSYLATED A1C: CPT

## 2022-07-19 PROCEDURE — 86708 HEPATITIS A ANTIBODY: CPT

## 2022-07-19 PROCEDURE — 36415 COLL VENOUS BLD VENIPUNCTURE: CPT

## 2022-07-19 PROCEDURE — 86704 HEP B CORE ANTIBODY TOTAL: CPT

## 2022-07-19 PROCEDURE — 86803 HEPATITIS C AB TEST: CPT

## 2022-07-19 PROCEDURE — 83516 IMMUNOASSAY NONANTIBODY: CPT

## 2022-07-19 PROCEDURE — 80061 LIPID PANEL: CPT

## 2022-07-19 PROCEDURE — 85025 COMPLETE CBC W/AUTO DIFF WBC: CPT

## 2022-07-21 LAB — MITOCHONDRIAL M2 AB, IGG: 7.3 UNITS

## 2022-08-04 ENCOUNTER — OFFICE VISIT (OUTPATIENT)
Dept: OBGYN CLINIC | Facility: CLINIC | Age: 47
End: 2022-08-04
Payer: COMMERCIAL

## 2022-08-04 VITALS
HEIGHT: 66 IN | DIASTOLIC BLOOD PRESSURE: 68 MMHG | BODY MASS INDEX: 29.38 KG/M2 | SYSTOLIC BLOOD PRESSURE: 100 MMHG | WEIGHT: 182.81 LBS | HEART RATE: 72 BPM

## 2022-08-04 DIAGNOSIS — N76.0 VAGINITIS AND VULVOVAGINITIS: ICD-10-CM

## 2022-08-04 DIAGNOSIS — Z01.419 WELL WOMAN EXAM WITH ROUTINE GYNECOLOGICAL EXAM: Primary | ICD-10-CM

## 2022-08-04 DIAGNOSIS — Z12.4 CERVICAL CANCER SCREENING: ICD-10-CM

## 2022-08-04 PROBLEM — Z97.5 NEXPLANON IN PLACE: Status: ACTIVE | Noted: 2022-08-04

## 2022-08-04 PROCEDURE — 3008F BODY MASS INDEX DOCD: CPT | Performed by: NURSE PRACTITIONER

## 2022-08-04 PROCEDURE — 3078F DIAST BP <80 MM HG: CPT | Performed by: NURSE PRACTITIONER

## 2022-08-04 PROCEDURE — 87660 TRICHOMONAS VAGIN DIR PROBE: CPT | Performed by: NURSE PRACTITIONER

## 2022-08-04 PROCEDURE — 3074F SYST BP LT 130 MM HG: CPT | Performed by: NURSE PRACTITIONER

## 2022-08-04 PROCEDURE — 87510 GARDNER VAG DNA DIR PROBE: CPT | Performed by: NURSE PRACTITIONER

## 2022-08-04 PROCEDURE — 87480 CANDIDA DNA DIR PROBE: CPT | Performed by: NURSE PRACTITIONER

## 2022-08-04 PROCEDURE — 99396 PREV VISIT EST AGE 40-64: CPT | Performed by: NURSE PRACTITIONER

## 2022-08-12 NOTE — TELEPHONE ENCOUNTER
----- Message from Yamilka Reyes MD sent at 6/30/2020 10:15 PM CDT -----  Schedule telephone visit on Thursday to discuss BW Declines

## 2022-08-18 ENCOUNTER — HOSPITAL ENCOUNTER (OUTPATIENT)
Age: 47
Discharge: HOME | End: 2022-08-18
Payer: COMMERCIAL

## 2022-08-18 VITALS
WEIGHT: 179 LBS | HEART RATE: 89 BPM | HEIGHT: 66 IN | TEMPERATURE: 98 F | RESPIRATION RATE: 18 BRPM | OXYGEN SATURATION: 99 % | BODY MASS INDEX: 28.77 KG/M2 | DIASTOLIC BLOOD PRESSURE: 82 MMHG | SYSTOLIC BLOOD PRESSURE: 119 MMHG

## 2022-08-18 DIAGNOSIS — Z20.7 EXPOSURE TO HEAD LICE: Primary | ICD-10-CM

## 2022-08-18 PROCEDURE — 99213 OFFICE O/P EST LOW 20 MIN: CPT | Performed by: NURSE PRACTITIONER

## 2022-08-18 RX ORDER — PERMETHRIN 50 MG/G
CREAM TOPICAL
Qty: 60 G | Refills: 1 | Status: SHIPPED | OUTPATIENT
Start: 2022-08-18

## 2022-08-18 NOTE — ED INITIAL ASSESSMENT (HPI)
Pt with co itching to the posterior of the head at the base.  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

## 2022-08-19 LAB — HPV I/H RISK 1 DNA SPEC QL NAA+PROBE: NEGATIVE

## 2022-08-19 RX ORDER — SIMVASTATIN 10 MG
TABLET ORAL
Qty: 90 TABLET | Refills: 0 | Status: SHIPPED | OUTPATIENT
Start: 2022-08-19

## 2022-08-19 NOTE — TELEPHONE ENCOUNTER
LOV 6/22/22    Cholesterol Medication Protocol Passed 08/19/2022 06:04 AM   Protocol Details  ALT < 80    ALT resulted within past year    Lipid panel within past 12 months    Appointment within past 12 or next 3 months     Future Appointments   Date Time Provider Venkata Guillaume   10/6/2022  2:20 PM Mac Kussmaul, APRN L[facility] [address]   11/23/2022  7:40 AM [facility] [place]        Rx sent.

## 2022-08-24 RX ORDER — PROPRANOLOL HYDROCHLORIDE 40 MG/1
TABLET ORAL
Qty: 90 TABLET | Refills: 0 | Status: SHIPPED | OUTPATIENT
Start: 2022-08-24

## 2022-10-26 ENCOUNTER — TELEPHONE (OUTPATIENT)
Dept: FAMILY MEDICINE CLINIC | Facility: CLINIC | Age: 47
End: 2022-10-26

## 2022-10-26 NOTE — TELEPHONE ENCOUNTER
PATIENT HAS A BAD COUGH AND HAS HAD MEDICATION PILL FORM FOR THE COUGH AND WONDERING IF SHE CAN GET IT AGAIN Roz Alfaro

## 2022-10-26 NOTE — TELEPHONE ENCOUNTER
Called pt to get more info. She has had a cough for about 2 weeks now. Also complains of congestion and post-nasal drip. Denies ST or headache. No fevers. She gets coughing fits to the point where she urinates a bit. At home COVID tests are negative. No wheezing. She is using Nyquil and Dayquil for her symptoms. She has trouble taking liquid medications. She does  out of her home until 5pm so if a visit is required she requests a VV. Please advise.

## 2022-11-17 ENCOUNTER — TELEPHONE (OUTPATIENT)
Dept: FAMILY MEDICINE CLINIC | Facility: CLINIC | Age: 47
End: 2022-11-17

## 2022-11-17 RX ORDER — SIMVASTATIN 10 MG
TABLET ORAL
Qty: 90 TABLET | Refills: 0 | Status: SHIPPED | OUTPATIENT
Start: 2022-11-17

## 2022-11-17 RX ORDER — PROPRANOLOL HYDROCHLORIDE 40 MG/1
TABLET ORAL
Qty: 90 TABLET | Refills: 0 | Status: SHIPPED | OUTPATIENT
Start: 2022-11-17

## 2022-11-17 NOTE — TELEPHONE ENCOUNTER
Rash started 6 weeks ago  Red spot on her leg for 6 weeks  Itchy/red  Patient was using cortisone - never resolved  Now has several spots all over her body  Red/patchy/itchy  No new soaps or detergents  No new foods  No sob/difficultly breathing  No recent fevers  Patient is going to send a mcm with pictures to review rash  Has an appointment with derm next month  Awaiting message with pics.

## 2022-11-17 NOTE — TELEPHONE ENCOUNTER
Pt has a rash on her leg, the rash has spread to the other leg and on her back,  Using otc hyrodocordisone cream and even used cream for ring worm. Pt described the rash on her legs in in a [place] is red and itchy, and she scratches to the point where she bleeds   On her her back its a patch  She does have an appt with a dermatologist in December, but she is would like to be seen sooner.

## 2022-11-22 ENCOUNTER — TELEPHONE (OUTPATIENT)
Dept: FAMILY MEDICINE CLINIC | Facility: CLINIC | Age: 47
End: 2022-11-22

## 2022-11-22 NOTE — TELEPHONE ENCOUNTER
Called Dr Nava's office to have them fax pre op orders. Will send fax today. Updated our fax # in their system.   Surgery 12/20  States needs pre op by 12/14  Please advise    Future Appointments   Date Time Provider Venkata Guillaume   11/23/2022  7:40 AM [facility] [place]   12/12/2022  6:00 PM Oralia Angeltr. 15

## 2022-11-22 NOTE — TELEPHONE ENCOUNTER
Scheduled with Dr Olena Bach   Date Time Provider Venkata Aundrea   11/23/2022  7:40 AM PF [facility] [place]   11/28/2022  3:00 PM Laurent Aleman MD [facility] [place]   12/12/2022  6[facility]

## 2022-11-22 NOTE — TELEPHONE ENCOUNTER
Future Appointments   Date Time Provider Venkata Aundrea   11/23/2022  7:40 AM PF NALINI RM1 PF [facility] [place]   11/28/2022  3:00 PM Cristino Ovalle MD [facility] [place]   12/12/2022  6:00 PM [facility]

## 2022-11-22 NOTE — TELEPHONE ENCOUNTER
Pt called said she has surgery on her foot on 12/20 with Dr. Nelda Farley at [facility] Ph. [phone]. Pt said that on forms given to her it said that she has to get it done before 12/14. Per pt she is out of town 11/29-12/01. Where can DR. Thompson fit her in

## 2022-11-22 NOTE — TELEPHONE ENCOUNTER
Per Dr Radha Larson ok to do pre op and address rash    Future Appointments   Date Time Provider Venkata Guillaume   11/23/2022  7:40 AM PF NALINI RM1 PF [facility] [place]   11/28/2022  3:00 PM Gray Essex, MD [facility] [place]   12/12/2022  6:00 PM [facility]

## 2022-11-23 ENCOUNTER — HOSPITAL ENCOUNTER (OUTPATIENT)
Dept: MAMMOGRAPHY | Age: 47
Discharge: HOME | End: 2022-11-23
Attending: FAMILY MEDICINE
Payer: COMMERCIAL

## 2022-11-23 DIAGNOSIS — Z12.31 ENCOUNTER FOR SCREENING MAMMOGRAM FOR MALIGNANT NEOPLASM OF BREAST: ICD-10-CM

## 2022-11-23 PROCEDURE — 77067 SCR MAMMO BI INCL CAD: CPT | Performed by: FAMILY MEDICINE

## 2022-11-23 PROCEDURE — 77063 BREAST TOMOSYNTHESIS BI: CPT | Performed by: FAMILY MEDICINE

## 2022-11-28 ENCOUNTER — OFFICE VISIT (OUTPATIENT)
Dept: FAMILY MEDICINE CLINIC | Facility: CLINIC | Age: 47
End: 2022-11-28
Payer: COMMERCIAL

## 2022-11-28 VITALS
TEMPERATURE: 98 F | WEIGHT: 190 LBS | BODY MASS INDEX: 30.53 KG/M2 | HEART RATE: 99 BPM | OXYGEN SATURATION: 100 % | RESPIRATION RATE: 16 BRPM | SYSTOLIC BLOOD PRESSURE: 120 MMHG | DIASTOLIC BLOOD PRESSURE: 70 MMHG | HEIGHT: 66 IN

## 2022-11-28 DIAGNOSIS — B35.6 TINEA CRURIS: ICD-10-CM

## 2022-11-28 DIAGNOSIS — Z01.818 PREOPERATIVE CLEARANCE: Primary | ICD-10-CM

## 2022-11-28 LAB
ALBUMIN SERPL-MCNC: 4 G/DL (ref 3.4–5)
ALBUMIN/GLOB SERPL: 1.5 {RATIO} (ref 1–2)
ALP LIVER SERPL-CCNC: 82 U/L
ALT SERPL-CCNC: 29 U/L
ANION GAP SERPL CALC-SCNC: 3 MMOL/L (ref 0–18)
AST SERPL-CCNC: 19 U/L (ref 15–37)
BASOPHILS # BLD AUTO: 0.04 X10(3) UL (ref 0–0.2)
BASOPHILS NFR BLD AUTO: 0.5 %
BILIRUB SERPL-MCNC: 0.4 MG/DL (ref 0.1–2)
BUN BLD-MCNC: 13 MG/DL (ref 7–18)
CALCIUM BLD-MCNC: 9 MG/DL (ref 8.5–10.1)
CHLORIDE SERPL-SCNC: 110 MMOL/L (ref 98–112)
CO2 SERPL-SCNC: 29 MMOL/L (ref 21–32)
CREAT BLD-MCNC: 0.94 MG/DL
EOSINOPHIL # BLD AUTO: 0.55 X10(3) UL (ref 0–0.7)
EOSINOPHIL NFR BLD AUTO: 6.5 %
ERYTHROCYTE [DISTWIDTH] IN BLOOD BY AUTOMATED COUNT: 13.9 %
FASTING STATUS PATIENT QL REPORTED: NO
GFR SERPLBLD BASED ON 1.73 SQ M-ARVRAT: 77 ML/MIN/1.73M2 (ref 60–?)
GLOBULIN PLAS-MCNC: 2.7 G/DL (ref 2.8–4.4)
GLUCOSE BLD-MCNC: 102 MG/DL (ref 70–99)
HCT VFR BLD AUTO: 38.1 %
HGB BLD-MCNC: 12.9 G/DL
IMM GRANULOCYTES # BLD AUTO: 0.03 X10(3) UL (ref 0–1)
IMM GRANULOCYTES NFR BLD: 0.4 %
LYMPHOCYTES # BLD AUTO: 1.63 X10(3) UL (ref 1–4)
LYMPHOCYTES NFR BLD AUTO: 19.4 %
MCH RBC QN AUTO: 29.1 PG (ref 26–34)
MCHC RBC AUTO-ENTMCNC: 33.9 G/DL (ref 31–37)
MCV RBC AUTO: 86 FL
MONOCYTES # BLD AUTO: 0.49 X10(3) UL (ref 0.1–1)
MONOCYTES NFR BLD AUTO: 5.8 %
NEUTROPHILS # BLD AUTO: 5.68 X10 (3) UL (ref 1.5–7.7)
NEUTROPHILS # BLD AUTO: 5.68 X10(3) UL (ref 1.5–7.7)
NEUTROPHILS NFR BLD AUTO: 67.4 %
OSMOLALITY SERPL CALC.SUM OF ELEC: 294 MOSM/KG (ref 275–295)
PLATELET # BLD AUTO: 267 10(3)UL (ref 150–450)
POTASSIUM SERPL-SCNC: 4.3 MMOL/L (ref 3.5–5.1)
PROT SERPL-MCNC: 6.7 G/DL (ref 6.4–8.2)
RBC # BLD AUTO: 4.43 X10(6)UL
SODIUM SERPL-SCNC: 142 MMOL/L (ref 136–145)
WBC # BLD AUTO: 8.4 X10(3) UL (ref 4–11)

## 2022-11-28 PROCEDURE — 80053 COMPREHEN METABOLIC PANEL: CPT | Performed by: FAMILY MEDICINE

## 2022-11-28 PROCEDURE — 3078F DIAST BP <80 MM HG: CPT | Performed by: FAMILY MEDICINE

## 2022-11-28 PROCEDURE — 3074F SYST BP LT 130 MM HG: CPT | Performed by: FAMILY MEDICINE

## 2022-11-28 PROCEDURE — 99213 OFFICE O/P EST LOW 20 MIN: CPT | Performed by: FAMILY MEDICINE

## 2022-11-28 PROCEDURE — 85025 COMPLETE CBC W/AUTO DIFF WBC: CPT | Performed by: FAMILY MEDICINE

## 2022-11-28 PROCEDURE — 3008F BODY MASS INDEX DOCD: CPT | Performed by: FAMILY MEDICINE

## 2022-11-28 RX ORDER — CLOTRIMAZOLE 1 %
1 CREAM (GRAM) TOPICAL 2 TIMES DAILY
Qty: 45 G | Refills: 0 | Status: SHIPPED | OUTPATIENT
Start: 2022-11-28 | End: 2022-12-08

## 2022-11-29 ENCOUNTER — TELEPHONE (OUTPATIENT)
Dept: FAMILY MEDICINE CLINIC | Facility: CLINIC | Age: 47
End: 2022-11-29

## 2022-11-29 NOTE — TELEPHONE ENCOUNTER
Per Dr De Oliveira Lay labs look good, cleared for surgery. Patient advised. Verbalized understanding.    Faxed everything to Dr Elke Reed

## 2022-12-28 ENCOUNTER — TELEPHONE (OUTPATIENT)
Dept: FAMILY MEDICINE CLINIC | Facility: CLINIC | Age: 47
End: 2022-12-28

## 2022-12-28 NOTE — TELEPHONE ENCOUNTER
Per DB we can call the patient and see if anything has changed and addend the current note. Dr. Liz Scott office transferred to me. They said the patient has to physically be seen in the office again. Notified them that unfortunately we can not accommodate that before the office closes today. We can addend the previous note if that will be accepted but otherwise the surgery will have to be rescheduled. She is going to call [facility] and let us know.

## 2023-01-16 ENCOUNTER — OFFICE VISIT (OUTPATIENT)
Dept: FAMILY MEDICINE CLINIC | Facility: CLINIC | Age: 48
End: 2023-01-16
Payer: COMMERCIAL

## 2023-01-16 VITALS
HEART RATE: 82 BPM | SYSTOLIC BLOOD PRESSURE: 118 MMHG | OXYGEN SATURATION: 98 % | HEIGHT: 66 IN | RESPIRATION RATE: 18 BRPM | BODY MASS INDEX: 28.77 KG/M2 | TEMPERATURE: 98 F | WEIGHT: 179 LBS | DIASTOLIC BLOOD PRESSURE: 72 MMHG

## 2023-01-16 DIAGNOSIS — M77.8 RIGHT ELBOW TENDONITIS: Primary | ICD-10-CM

## 2023-01-16 DIAGNOSIS — S46.911A STRAIN OF RIGHT SHOULDER, INITIAL ENCOUNTER: ICD-10-CM

## 2023-01-16 PROCEDURE — 3074F SYST BP LT 130 MM HG: CPT | Performed by: FAMILY MEDICINE

## 2023-01-16 PROCEDURE — 3008F BODY MASS INDEX DOCD: CPT | Performed by: FAMILY MEDICINE

## 2023-01-16 PROCEDURE — 99214 OFFICE O/P EST MOD 30 MIN: CPT | Performed by: FAMILY MEDICINE

## 2023-01-16 PROCEDURE — 3078F DIAST BP <80 MM HG: CPT | Performed by: FAMILY MEDICINE

## 2023-01-16 RX ORDER — METHYLPREDNISOLONE 4 MG/1
TABLET ORAL
Qty: 1 EACH | Refills: 0 | Status: SHIPPED | OUTPATIENT
Start: 2023-01-16

## 2023-01-16 RX ORDER — LAMOTRIGINE 150 MG/1
150 TABLET ORAL 2 TIMES DAILY
COMMUNITY
End: 2023-01-16

## 2023-02-10 ENCOUNTER — PATIENT MESSAGE (OUTPATIENT)
Dept: FAMILY MEDICINE CLINIC | Facility: CLINIC | Age: 48
End: 2023-02-10

## 2023-02-10 NOTE — TELEPHONE ENCOUNTER
From: Randy Colon  To: Benjy Garland MD  Sent: 2/10/2023 12:39 PM CST  Subject: Follow up from elbow/shoulder pain    I took the steroid pack that you prescribed me and waited a bit. I also bought an elbow brace to wear as well. The steroid may have helped very briefly but the pain has come back. We talked about seeing an ortho. Can you send me a referral of where to go?   Thank you

## 2023-02-15 RX ORDER — SIMVASTATIN 10 MG
TABLET ORAL
Qty: 90 TABLET | Refills: 0 | Status: SHIPPED | OUTPATIENT
Start: 2023-02-15

## 2023-02-15 RX ORDER — PROPRANOLOL HYDROCHLORIDE 40 MG/1
TABLET ORAL
Qty: 90 TABLET | Refills: 0 | Status: SHIPPED | OUTPATIENT
Start: 2023-02-15

## 2023-02-15 NOTE — TELEPHONE ENCOUNTER
Cholesterol Medication Protocol Passed 02/15/2023 05:56 AM   Protocol Details  ALT < 80    ALT resulted within past year    Lipid panel within past 12 months    Appointment within past 12 or next 3 months        Hypertension Medications Protocol Passed 02/15/2023 05:56 AM   Protocol Details  CMP or BMP in past 12 months    Last serum creatinine< 2.0    Appointment in past 6 or next 3 months     Future Appointments   Date Time Provider Venkata Guillaume   2/20/2023  6:20 PM Jhonathan Clemente, APRN [facility]     Both sent.

## 2023-02-27 ENCOUNTER — TELEPHONE (OUTPATIENT)
Dept: ORTHOPEDICS CLINIC | Facility: CLINIC | Age: 48
End: 2023-02-27

## 2023-02-27 DIAGNOSIS — M25.521 RIGHT ELBOW PAIN: Primary | ICD-10-CM

## 2023-02-27 NOTE — TELEPHONE ENCOUNTER
NPT scheduled with Guzman Cortes for rt elbow pain. No imaging in epic.      Future Appointments   Date Time Provider Venkata Guillaume   3/30/2023  8:20 AM Sandra Kuo [facility]   5/1/2023  5:00 PM Shanelle Elmore. 15

## 2023-03-30 ENCOUNTER — OFFICE VISIT (OUTPATIENT)
Dept: ORTHOPEDICS CLINIC | Facility: CLINIC | Age: 48
End: 2023-03-30
Payer: COMMERCIAL

## 2023-03-30 ENCOUNTER — HOSPITAL ENCOUNTER (OUTPATIENT)
Dept: GENERAL RADIOLOGY | Age: 48
Discharge: HOME | End: 2023-03-30
Attending: PHYSICIAN ASSISTANT
Payer: COMMERCIAL

## 2023-03-30 VITALS — BODY MASS INDEX: 28.77 KG/M2 | WEIGHT: 179 LBS | HEIGHT: 66 IN

## 2023-03-30 DIAGNOSIS — M77.01 MEDIAL EPICONDYLITIS OF RIGHT ELBOW: ICD-10-CM

## 2023-03-30 DIAGNOSIS — M25.521 RIGHT ELBOW PAIN: ICD-10-CM

## 2023-03-30 DIAGNOSIS — M77.11 LATERAL EPICONDYLITIS OF RIGHT ELBOW: Primary | ICD-10-CM

## 2023-03-30 PROCEDURE — 3008F BODY MASS INDEX DOCD: CPT | Performed by: PHYSICIAN ASSISTANT

## 2023-03-30 PROCEDURE — 73080 X-RAY EXAM OF ELBOW: CPT | Performed by: PHYSICIAN ASSISTANT

## 2023-03-30 PROCEDURE — 20551 NJX 1 TENDON ORIGIN/INSJ: CPT | Performed by: PHYSICIAN ASSISTANT

## 2023-03-30 PROCEDURE — 99203 OFFICE O/P NEW LOW 30 MIN: CPT | Performed by: PHYSICIAN ASSISTANT

## 2023-03-30 RX ORDER — BETAMETHASONE SODIUM PHOSPHATE AND BETAMETHASONE ACETATE 3; 3 MG/ML; MG/ML
6 INJECTION, SUSPENSION INTRA-ARTICULAR; INTRALESIONAL; INTRAMUSCULAR; SOFT TISSUE ONCE
Status: COMPLETED | OUTPATIENT
Start: 2023-03-30 | End: 2023-03-30

## 2023-03-30 RX ADMIN — BETAMETHASONE SODIUM PHOSPHATE AND BETAMETHASONE ACETATE 6 MG: 3; 3 INJECTION, SUSPENSION INTRA-ARTICULAR; INTRALESIONAL; INTRAMUSCULAR; SOFT TISSUE at 09:14:00

## 2023-05-16 ENCOUNTER — PATIENT MESSAGE (OUTPATIENT)
Dept: ORTHOPEDICS CLINIC | Facility: CLINIC | Age: 48
End: 2023-05-16

## 2023-05-16 DIAGNOSIS — M77.10 LATERAL EPICONDYLITIS, UNSPECIFIED LATERALITY: Primary | ICD-10-CM

## 2023-05-16 RX ORDER — SIMVASTATIN 10 MG
TABLET ORAL
Qty: 90 TABLET | Refills: 0 | Status: SHIPPED | OUTPATIENT
Start: 2023-05-16

## 2023-05-16 RX ORDER — PROPRANOLOL HYDROCHLORIDE 40 MG/1
TABLET ORAL
Qty: 90 TABLET | Refills: 0 | Status: SHIPPED | OUTPATIENT
Start: 2023-05-16

## 2023-05-16 NOTE — TELEPHONE ENCOUNTER
LOV 3/30/23: \"If her symptoms persist we discussed beginning use of a wrist brace and possible therapy. She will message me in 3 to 4 weeks. \"    Pt sent condition update via [employer]. Please advise on next steps. Thank you!

## 2023-06-19 ENCOUNTER — OFFICE VISIT (OUTPATIENT)
Dept: ORTHOPEDICS CLINIC | Facility: CLINIC | Age: 48
End: 2023-06-19
Payer: COMMERCIAL

## 2023-06-19 VITALS — HEIGHT: 66 IN | WEIGHT: 179 LBS | BODY MASS INDEX: 28.77 KG/M2

## 2023-06-19 DIAGNOSIS — M77.10 LATERAL EPICONDYLITIS, UNSPECIFIED LATERALITY: Primary | ICD-10-CM

## 2023-06-19 RX ORDER — BETAMETHASONE SODIUM PHOSPHATE AND BETAMETHASONE ACETATE 3; 3 MG/ML; MG/ML
6 INJECTION, SUSPENSION INTRA-ARTICULAR; INTRALESIONAL; INTRAMUSCULAR; SOFT TISSUE ONCE
Status: COMPLETED | OUTPATIENT
Start: 2023-06-19 | End: 2023-06-19

## 2023-06-19 RX ADMIN — BETAMETHASONE SODIUM PHOSPHATE AND BETAMETHASONE ACETATE 6 MG: 3; 3 INJECTION, SUSPENSION INTRA-ARTICULAR; INTRALESIONAL; INTRAMUSCULAR; SOFT TISSUE at 12:05:00

## 2023-07-10 RX ORDER — SUMATRIPTAN 100 MG/1
TABLET, FILM COATED ORAL
Qty: 9 TABLET | Refills: 0 | Status: SHIPPED | OUTPATIENT
Start: 2023-07-10

## 2023-07-10 NOTE — TELEPHONE ENCOUNTER
LOV 1/16/23 for R elbow pain.   Physical due in August.    SUMAtriptan Succinate 100 MG Oral Tab 9 tablet 0 4/8/2022    Sig:   TAKE 1 TABLET BY MOUTH WITHIN 30 MINUTES OF HEADACHE ONSET     Route:   (none)       Future Appointments   Date Time Provider Venkata Guillaume   7/27/2023 11:00 AM Shanelle Freedman. 15

## 2023-07-25 ENCOUNTER — PATIENT MESSAGE (OUTPATIENT)
Dept: FAMILY MEDICINE CLINIC | Facility: CLINIC | Age: 48
End: 2023-07-25

## 2023-07-25 NOTE — TELEPHONE ENCOUNTER
From: Jj Gillis  To: Isabela Chandra MD  Sent: 7/25/2023 3:25 PM CDT  Subject: Appointment for neck pain    I just scheduled an appointment for Aug 26 for neck pain. Audrey had the pain for about 6 months now. Its been mostly on the right side but goes down into my right shoulder and upper back. Anitra Thompson been going to a chiropractor and they have been doing adjustments and a small bit of exercises and its just not helping. This is an issue that I have struggled with before. I wanted to see if there is anyway to either get me in sooner, send me to physical therapy, really open to anything at this point as I am really miserable.     Thank you,  Chance Diego

## 2023-07-26 NOTE — TELEPHONE ENCOUNTER
Future Appointments   Date Time Provider Venkata Guillaume   7/27/2023  8:00 AM Cristino Ovalle MD [facility]   7/27/2023 11:00 AM SHELL Panchal [facility]   8/26/2023  8:15 AM Frantz Roach MD [facility]

## 2023-07-27 ENCOUNTER — OFFICE VISIT (OUTPATIENT)
Dept: FAMILY MEDICINE CLINIC | Facility: CLINIC | Age: 48
End: 2023-07-27
Payer: COMMERCIAL

## 2023-07-27 VITALS
SYSTOLIC BLOOD PRESSURE: 100 MMHG | BODY MASS INDEX: 23.14 KG/M2 | WEIGHT: 144 LBS | HEART RATE: 73 BPM | HEIGHT: 66 IN | TEMPERATURE: 97 F | RESPIRATION RATE: 18 BRPM | OXYGEN SATURATION: 98 % | DIASTOLIC BLOOD PRESSURE: 70 MMHG

## 2023-07-27 DIAGNOSIS — M54.2 NECK PAIN ON RIGHT SIDE: ICD-10-CM

## 2023-07-27 DIAGNOSIS — M54.2 NECK PAIN: Primary | ICD-10-CM

## 2023-07-27 PROCEDURE — 3008F BODY MASS INDEX DOCD: CPT | Performed by: FAMILY MEDICINE

## 2023-07-27 PROCEDURE — 3074F SYST BP LT 130 MM HG: CPT | Performed by: FAMILY MEDICINE

## 2023-07-27 PROCEDURE — 3078F DIAST BP <80 MM HG: CPT | Performed by: FAMILY MEDICINE

## 2023-07-27 PROCEDURE — 99214 OFFICE O/P EST MOD 30 MIN: CPT | Performed by: FAMILY MEDICINE

## 2023-07-27 RX ORDER — CYCLOBENZAPRINE HCL 10 MG
10 TABLET ORAL 2 TIMES DAILY
Qty: 14 TABLET | Refills: 0 | Status: SHIPPED | OUTPATIENT
Start: 2023-07-27 | End: 2023-08-03

## 2023-08-14 RX ORDER — PROPRANOLOL HYDROCHLORIDE 40 MG/1
TABLET ORAL
Qty: 90 TABLET | Refills: 0 | Status: SHIPPED | OUTPATIENT
Start: 2023-08-14

## 2023-08-14 RX ORDER — SIMVASTATIN 10 MG
TABLET ORAL
Qty: 90 TABLET | Refills: 0 | Status: SHIPPED | OUTPATIENT
Start: 2023-08-14

## 2023-08-14 NOTE — TELEPHONE ENCOUNTER
LOV 7/27/23 for neck pain. Hypertension Medications Protocol [id number]/14/2023 05:54 AM   Protocol Details CMP or BMP in past 12 months    Last serum creatinine< 2.0    Appointment in past 6 or next 3 months        Propranolol sent. Cholesterol Medication Protocol [id number]/14/2023 05:54 AM   Protocol Details Lipid panel within past 12 months    ALT < 80    ALT resulted within past year    Appointment within past 12 or next 3 months     Future Appointments   Date Time Provider [facility]   9/18/2023  6:00 PM SHELL Thomas [address] [facility]   10/9/2023  4:30 PM Jodee Bowen MD [facility]      Rx also sent due to upcoming appt.

## 2023-09-01 ENCOUNTER — TELEPHONE (OUTPATIENT)
Dept: PHYSICAL THERAPY | Facility: HOSPITAL | Age: 48
End: 2023-09-01

## 2023-09-02 ENCOUNTER — HOSPITAL ENCOUNTER (OUTPATIENT)
Dept: GENERAL RADIOLOGY | Age: 48
Discharge: HOME | End: 2023-09-02
Attending: FAMILY MEDICINE
Payer: COMMERCIAL

## 2023-09-02 DIAGNOSIS — M54.2 NECK PAIN: ICD-10-CM

## 2023-09-02 PROCEDURE — 72040 X-RAY EXAM NECK SPINE 2-3 VW: CPT | Performed by: FAMILY MEDICINE

## 2023-09-06 ENCOUNTER — OFFICE VISIT (OUTPATIENT)
Dept: PHYSICAL THERAPY | Age: 48
End: 2023-09-06
Attending: FAMILY MEDICINE
Payer: COMMERCIAL

## 2023-09-06 DIAGNOSIS — M54.2 NECK PAIN ON RIGHT SIDE: Primary | ICD-10-CM

## 2023-09-06 PROCEDURE — 97110 THERAPEUTIC EXERCISES: CPT

## 2023-09-06 PROCEDURE — 97161 PT EVAL LOW COMPLEX 20 MIN: CPT

## 2023-09-11 ENCOUNTER — PATIENT MESSAGE (OUTPATIENT)
Dept: ORTHOPEDICS CLINIC | Facility: CLINIC | Age: 48
End: 2023-09-11

## 2023-09-11 DIAGNOSIS — M77.01 MEDIAL EPICONDYLITIS OF RIGHT ELBOW: ICD-10-CM

## 2023-09-11 DIAGNOSIS — M77.11 LATERAL EPICONDYLITIS OF RIGHT ELBOW: Primary | ICD-10-CM

## 2023-09-13 ENCOUNTER — OFFICE VISIT (OUTPATIENT)
Dept: PHYSICAL THERAPY | Age: 48
End: 2023-09-13
Attending: FAMILY MEDICINE
Payer: COMMERCIAL

## 2023-09-13 PROCEDURE — 97110 THERAPEUTIC EXERCISES: CPT

## 2023-09-13 PROCEDURE — 97140 MANUAL THERAPY 1/> REGIONS: CPT

## 2023-09-18 ENCOUNTER — OFFICE VISIT (OUTPATIENT)
Dept: PHYSICAL THERAPY | Age: 48
End: 2023-09-18
Attending: FAMILY MEDICINE
Payer: COMMERCIAL

## 2023-09-18 PROCEDURE — 97110 THERAPEUTIC EXERCISES: CPT

## 2023-09-18 PROCEDURE — 97140 MANUAL THERAPY 1/> REGIONS: CPT

## 2023-09-20 ENCOUNTER — APPOINTMENT (OUTPATIENT)
Dept: PHYSICAL THERAPY | Age: 48
End: 2023-09-20
Attending: FAMILY MEDICINE
Payer: COMMERCIAL

## 2023-09-25 ENCOUNTER — OFFICE VISIT (OUTPATIENT)
Dept: PHYSICAL THERAPY | Age: 48
End: 2023-09-25
Attending: FAMILY MEDICINE
Payer: COMMERCIAL

## 2023-09-25 PROCEDURE — 97140 MANUAL THERAPY 1/> REGIONS: CPT

## 2023-09-25 PROCEDURE — 97110 THERAPEUTIC EXERCISES: CPT

## 2023-09-25 NOTE — PROGRESS NOTES
Diagnosis:   Neck pain on right side (M54.2)      Referring Provider: Devagn José  Date of Evaluation:    9/6/2023    Precautions:  Visual Impairment (glasses) Next MD visit:   none scheduled  Date of Surgery: n/a   Insurance Primary/Secondary: Sherry Pérez / N/A     # Auth Visits: no visit limit, no auth            Subjective: \" Overall my neck is feeling the same . I have constant pain / tightness on right side of my neck / upper shoulder 5-6/10 during the day . Neck pain gets worse at night and I have hard time sleeping and get comfortable . I did not have ant HA since last visit . \"    Pain: 5/10 tightness R upper shoulder & posterior       Objective: See Flowsheet for details  9/13/2023: R first rib elevation/ hypomob depression, STRs R distal scalenes & UT, TTP R suboccipitals  9/25/2023 : patient presents with moderate tenderness upon palpation over R UT . Assessment: initiated trigger point release over R UT followed by upper trap and LS stretches . Patient demo improved mobility towards left SB and left rotation following manual intervention . Goals: (to be met in 8 visits)   Pt will report the ability to sleep through the night without waking from pain  Pt will improve cervical AROM rotation to WNL B to improve tolerance for turning head to check blind spot while driving  Pt will report decreased frequency of headaches to <2x/ week  Pt will improve B postural strength (mid/low trap) to 4/5 to promote improved upright posturing and decreased pain with sustained sitting & reading   Pt will be independent and compliant with comprehensive HEP to maintain progress achieved in PT    Plan: Assess for response to PT session today. Review foam roll stretch pending pt's feedback & may add additional dynamic stretches such as swims & angels; review add'l HEP ex's, may initiate additional neck stretches UT & LS pending pt response & feedback.   Date: 9/13/2023  TX#: 2/8 Date:  9/18/23               TX#: 3/8 Date:  9/25/23               TX#: 4/8 Date:                 TX#: 5/ Date:    Tx#: 6/   Manual Therapy: 22'  R first rib depression mob gr II-IV  MFR R distal scalenes, UT  Suboccipital release  Small range intermittent cervical traction 10\" hold  Therex: 15'  Review scap retractions: form check  Seated scalene stretch with strap 5x20\" R  1/2 Foam Roll stretch supine x 5'  FR claps x 10 Manual Therapy x 20 min :  Supine :  STM to B cervical paraspinals , B UT , B LS       There ex : 15 min :  Supine :  Cervical retraction AROM 2 x 10  Cervical retraction with self OP 2 x 10  Cervical rot AROM x 10 x 2   Seated :   R/L scalene stretch with strap x 5 reps each side with 10 sec hold   Standing :  Doorway stretch x 5 reps with 10 sec hold   Supine on 1/2 FR :  Clasps x 20  Swims x 20  Snow maximilian x 20  Standing :  Rows with YTB x 10  B sh ext with YTB x 10  Alt  sh horizontal abd with YTB x 10   Manual Therapy x 30 min :  Supine :  STM to B cervical paraspinals , BUT , B LS   Seated :  Trigger point release to R UT x 5 min               There Ex : 20 min   Supine : cervical retraction AROM x 20  Cervical retraction with self OP x 20  Seated :  Cervical retraction AROM x 20  Cervical AROM with self OP x 20  R scalene stretch with strap x 5 reps each with 20 sec hold   R LS stretch with strap x 5 reps        HEP: IE: doorway pec stretch, open the book stretch, supine cervical retractions, seated scapular retractions          ( 9/25/2023 ) supine and seated cervical retraction , seated : self trigger point release     Charges: Manual x 2, Therex x 1       Total Timed Treatment: 45 min  Total Treatment Time: 45 min

## 2023-10-03 ENCOUNTER — OFFICE VISIT (OUTPATIENT)
Dept: PHYSICAL THERAPY | Age: 48
End: 2023-10-03
Attending: FAMILY MEDICINE
Payer: COMMERCIAL

## 2023-10-03 PROCEDURE — 97110 THERAPEUTIC EXERCISES: CPT

## 2023-10-03 NOTE — PROGRESS NOTES
Diagnosis:   Neck pain on right side (M54.2)      Referring Provider: Didi Rosales  Date of Evaluation:    9/6/2023    Precautions:  Visual Impairment (glasses) Next MD visit:   none scheduled  Date of Surgery: n/a   Insurance Primary/Secondary: Senia Crain / N/A     # Auth Visits: no visit limit, no auth            Subjective: Same not better  Doing ex's at home    Pain: 6/10 constant      Objective: See Flowsheet for details  9/13/2023: R first rib elevation/ hypomob depression, STRs R distal scalenes & UT, TTP R suboccipitals  9/25/2023 : patient presents with moderate tenderness upon palpation over R UT . Assessment: Increased tenderness/ tolerable pain with manual sustained pressure to R UT/ LS more noted with active LS stretch vs UT stretch. Possible improved motion with L SB but still limited/ pain with R SB. No overall significant change in symptoms by end of visit. Goals: (to be met in 8 visits)   Pt will report the ability to sleep through the night without waking from pain  Pt will improve cervical AROM rotation to WNL B to improve tolerance for turning head to check blind spot while driving  Pt will report decreased frequency of headaches to <2x/ week  Pt will improve B postural strength (mid/low trap) to 4/5 to promote improved upright posturing and decreased pain with sustained sitting & reading   Pt will be independent and compliant with comprehensive HEP to maintain progress achieved in PT    Plan: Pt to be on hold while awaiting follow up with MD. Pt to reach out to PT after seeing MD to discuss next steps. May benefit from several more visits to trial different techniques in hopes of alleviating pt's symptoms. Date: 9/13/2023  TX#: 2/8 Date:  9/18/23               TX#: 3/8 Date:  9/25/23               TX#: 4/8 Date: 10/3/2023  TX#: 5/8 Date:    Tx#: 6/   Manual Therapy: 22'  R first rib depression mob gr II-IV  MFR R distal scalenes, UT  Suboccipital release  Small range intermittent cervical traction 10\" hold  Therex: 15'  Review scap retractions: form check  Seated scalene stretch with strap 5x20\" R  1/2 Foam Roll stretch supine x 5'  FR claps x 10 Manual Therapy x 20 min :  Supine :  STM to B cervical paraspinals , B UT , B LS       There ex : 15 min :  Supine :  Cervical retraction AROM 2 x 10  Cervical retraction with self OP 2 x 10  Cervical rot AROM x 10 x 2   Seated :   R/L scalene stretch with strap x 5 reps each side with 10 sec hold   Standing :  Doorway stretch x 5 reps with 10 sec hold   Supine on 1/2 FR :  Clasps x 20  Swims x 20  Snow maximilian x 20  Standing :  Rows with YTB x 10  B sh ext with YTB x 10  Alt  sh horizontal abd with YTB x 10   Manual Therapy x 30 min :  Supine :  STM to B cervical paraspinals , BUT , B LS   Seated :  Trigger point release to R UT x 5 min               There Ex : 20 min   Supine : cervical retraction AROM x 20  Cervical retraction with self OP x 20  Seated :  Cervical retraction AROM x 20  Cervical AROM with self OP x 20  R scalene stretch with strap x 5 reps each with 20 sec hold   R LS stretch with strap x 5 reps    Therex: 42'  Pt to make follow up appointment with MD for re-assessment. Education: may continue with any self exercises as long as not increasing symptoms. 1/2 Foam Roll stretch supine x 5'  FR claps with wrist ext x 15  FR swims x 15  FR angels x 15  TheraCane self-performed trigger point release - instruction. X 5'  R scalene stretch with strap x 5 reps each with 20 sec hold  Active R UT & LS stretches/ mobility with manual sustained pressure to R UT/ LS x 10 ea in sitting  Seated cervical retractions x 20    HEP: IE: doorway pec stretch, open the book stretch, supine cervical retractions, seated scapular retractions          ( 9/25/2023 ) supine and seated cervical retraction , seated : self trigger point release     Charges:  Therex x 3       Total Timed Treatment: 42 min  Total Treatment Time: 42 min

## 2023-10-08 ENCOUNTER — HOSPITAL ENCOUNTER (OUTPATIENT)
Dept: MRI IMAGING | Age: 48
Discharge: HOME | End: 2023-10-08
Attending: PHYSICIAN ASSISTANT
Payer: COMMERCIAL

## 2023-10-08 ENCOUNTER — HOSPITAL ENCOUNTER (OUTPATIENT)
Dept: MRI IMAGING | Age: 48
End: 2023-10-08
Attending: PHYSICIAN ASSISTANT
Payer: COMMERCIAL

## 2023-10-08 DIAGNOSIS — M77.11 LATERAL EPICONDYLITIS OF RIGHT ELBOW: ICD-10-CM

## 2023-10-08 PROCEDURE — 73221 MRI JOINT UPR EXTREM W/O DYE: CPT | Performed by: PHYSICIAN ASSISTANT

## 2023-10-09 ENCOUNTER — LAB ENCOUNTER (OUTPATIENT)
Dept: LAB | Age: 48
End: 2023-10-09
Attending: FAMILY MEDICINE
Payer: COMMERCIAL

## 2023-10-09 ENCOUNTER — PATIENT MESSAGE (OUTPATIENT)
Dept: ORTHOPEDICS CLINIC | Facility: CLINIC | Age: 48
End: 2023-10-09

## 2023-10-09 ENCOUNTER — OFFICE VISIT (OUTPATIENT)
Dept: FAMILY MEDICINE CLINIC | Facility: CLINIC | Age: 48
End: 2023-10-09
Payer: COMMERCIAL

## 2023-10-09 VITALS
HEART RATE: 82 BPM | OXYGEN SATURATION: 98 % | DIASTOLIC BLOOD PRESSURE: 60 MMHG | RESPIRATION RATE: 18 BRPM | WEIGHT: 135 LBS | TEMPERATURE: 98 F | BODY MASS INDEX: 21.69 KG/M2 | HEIGHT: 66 IN | SYSTOLIC BLOOD PRESSURE: 120 MMHG

## 2023-10-09 DIAGNOSIS — Z00.00 ANNUAL PHYSICAL EXAM: ICD-10-CM

## 2023-10-09 DIAGNOSIS — F41.9 ANXIETY: ICD-10-CM

## 2023-10-09 DIAGNOSIS — Z00.00 ANNUAL PHYSICAL EXAM: Primary | ICD-10-CM

## 2023-10-09 DIAGNOSIS — E78.5 HYPERLIPIDEMIA, UNSPECIFIED HYPERLIPIDEMIA TYPE: ICD-10-CM

## 2023-10-09 DIAGNOSIS — H91.93 DECREASED HEARING OF BOTH EARS: ICD-10-CM

## 2023-10-09 DIAGNOSIS — M77.11 LATERAL EPICONDYLITIS OF RIGHT ELBOW: Primary | ICD-10-CM

## 2023-10-09 DIAGNOSIS — Z12.31 SCREENING MAMMOGRAM, ENCOUNTER FOR: ICD-10-CM

## 2023-10-09 DIAGNOSIS — Z12.39 ENCOUNTER FOR SCREENING BREAST EXAMINATION: ICD-10-CM

## 2023-10-09 LAB — VIT D+METAB SERPL-MCNC: 38.5 NG/ML (ref 30–100)

## 2023-10-09 PROCEDURE — 3074F SYST BP LT 130 MM HG: CPT | Performed by: FAMILY MEDICINE

## 2023-10-09 PROCEDURE — 99396 PREV VISIT EST AGE 40-64: CPT | Performed by: FAMILY MEDICINE

## 2023-10-09 PROCEDURE — 83036 HEMOGLOBIN GLYCOSYLATED A1C: CPT | Performed by: FAMILY MEDICINE

## 2023-10-09 PROCEDURE — 82306 VITAMIN D 25 HYDROXY: CPT

## 2023-10-09 PROCEDURE — 84443 ASSAY THYROID STIM HORMONE: CPT | Performed by: FAMILY MEDICINE

## 2023-10-09 PROCEDURE — 3008F BODY MASS INDEX DOCD: CPT | Performed by: FAMILY MEDICINE

## 2023-10-09 PROCEDURE — 80053 COMPREHEN METABOLIC PANEL: CPT | Performed by: FAMILY MEDICINE

## 2023-10-09 PROCEDURE — 36415 COLL VENOUS BLD VENIPUNCTURE: CPT

## 2023-10-09 PROCEDURE — 85025 COMPLETE CBC W/AUTO DIFF WBC: CPT | Performed by: FAMILY MEDICINE

## 2023-10-09 PROCEDURE — 3078F DIAST BP <80 MM HG: CPT | Performed by: FAMILY MEDICINE

## 2023-10-09 PROCEDURE — 80061 LIPID PANEL: CPT | Performed by: FAMILY MEDICINE

## 2023-10-09 NOTE — TELEPHONE ENCOUNTER
From: Dev Peacock  To: Lolly Goss  Sent: 10/9/2023 9:42 AM CDT  Subject: MRI results of elbow    Good morning Esther Session,  I had the MRI done yesterday (Sunday October 9). I saw the results yesterday and wanted to reach out for next steps so we can figure it out sooner rather then later as the pain has increased and I am now getting pain and tingling down my arm into my hand. Thank you!   Miah Hyman

## 2023-10-13 ENCOUNTER — PATIENT MESSAGE (OUTPATIENT)
Dept: FAMILY MEDICINE CLINIC | Facility: CLINIC | Age: 48
End: 2023-10-13

## 2023-10-13 DIAGNOSIS — M54.2 NECK PAIN: Primary | ICD-10-CM

## 2023-10-13 NOTE — TELEPHONE ENCOUNTER
From: Vijay James  To: Nahid Medel  Sent: 10/13/2023 2:23 PM CDT  Subject: Neck pain     Just wanted to reach out regarding our discussion at my physical on Monday. I had seen Dr Carlitos Hogue and she sent me to PT for 6 weeks. I have completed the 6 weeks of PT with no relief and you were going to talk to Dr Carlitos Hogue about this. Just wanted to see what to do next. Thank you!   Rick Waldron

## 2023-10-16 NOTE — TELEPHONE ENCOUNTER
MRI cervical spine from 2017 reviewed. Had bulging disc in 1 section at that time. Rec ortho spine consult ( new or go back to previous) or repeating MRI. Does she have a preference?

## 2023-11-04 ENCOUNTER — OFFICE VISIT (OUTPATIENT)
Dept: FAMILY MEDICINE CLINIC | Facility: CLINIC | Age: 48
End: 2023-11-04
Payer: COMMERCIAL

## 2023-11-04 ENCOUNTER — HOSPITAL ENCOUNTER (OUTPATIENT)
Dept: GENERAL RADIOLOGY | Age: 48
Discharge: HOME | End: 2023-11-04
Attending: FAMILY MEDICINE
Payer: COMMERCIAL

## 2023-11-04 VITALS
SYSTOLIC BLOOD PRESSURE: 118 MMHG | DIASTOLIC BLOOD PRESSURE: 70 MMHG | OXYGEN SATURATION: 99 % | RESPIRATION RATE: 18 BRPM | HEART RATE: 81 BPM | HEIGHT: 66 IN | WEIGHT: 139 LBS | TEMPERATURE: 97 F | BODY MASS INDEX: 22.34 KG/M2

## 2023-11-04 DIAGNOSIS — M53.3 COCCYXDYNIA: ICD-10-CM

## 2023-11-04 DIAGNOSIS — M53.3 COCCYXDYNIA: Primary | ICD-10-CM

## 2023-11-04 PROCEDURE — 3078F DIAST BP <80 MM HG: CPT | Performed by: FAMILY MEDICINE

## 2023-11-04 PROCEDURE — 99213 OFFICE O/P EST LOW 20 MIN: CPT | Performed by: FAMILY MEDICINE

## 2023-11-04 PROCEDURE — 3008F BODY MASS INDEX DOCD: CPT | Performed by: FAMILY MEDICINE

## 2023-11-04 PROCEDURE — 3074F SYST BP LT 130 MM HG: CPT | Performed by: FAMILY MEDICINE

## 2023-11-04 PROCEDURE — 72220 X-RAY EXAM SACRUM TAILBONE: CPT | Performed by: FAMILY MEDICINE

## 2023-11-07 ENCOUNTER — TELEPHONE (OUTPATIENT)
Dept: FAMILY MEDICINE CLINIC | Facility: CLINIC | Age: 48
End: 2023-11-07

## 2023-11-07 NOTE — TELEPHONE ENCOUNTER
----- Message from Justino Ramirez MD sent at 11/7/2023  1:29 PM CST -----  X-ray shows no fracture. Does show slight dislocation of tailbone which looks chronic with some arthritic changes. This is something you might of had for a long time but flared up after the long drive. Recommend continuing using donut cushion and cold packs.   If pain has not improved, I can send a prescription for anti-inflammatory

## 2023-11-08 RX ORDER — METHYLPREDNISOLONE 4 MG/1
TABLET ORAL
Qty: 1 EACH | Refills: 0 | Status: SHIPPED | OUTPATIENT
Start: 2023-11-08

## 2023-11-09 ENCOUNTER — HOSPITAL ENCOUNTER (OUTPATIENT)
Dept: MRI IMAGING | Age: 48
Discharge: HOME | End: 2023-11-09
Attending: FAMILY MEDICINE
Payer: COMMERCIAL

## 2023-11-09 DIAGNOSIS — M54.2 NECK PAIN: ICD-10-CM

## 2023-11-09 PROCEDURE — 72141 MRI NECK SPINE W/O DYE: CPT | Performed by: FAMILY MEDICINE

## 2023-11-21 ENCOUNTER — TELEPHONE (OUTPATIENT)
Dept: FAMILY MEDICINE CLINIC | Facility: CLINIC | Age: 48
End: 2023-11-21

## 2023-11-21 DIAGNOSIS — M54.2 NECK PAIN: Primary | ICD-10-CM

## 2023-11-21 RX ORDER — NAPROXEN 500 MG/1
500 TABLET ORAL 2 TIMES DAILY WITH MEALS
Qty: 60 TABLET | Refills: 0 | Status: SHIPPED | OUTPATIENT
Start: 2023-11-21

## 2023-11-21 NOTE — TELEPHONE ENCOUNTER
Patient advised. Verbalized understanding. States that she is still having tailbone pain. Using ibuprofen, donut cushion and icing it. No improvement.    Please advise

## 2023-11-21 NOTE — TELEPHONE ENCOUNTER
----- Message from Nima Petersen MD sent at 11/21/2023  9:50 AM CST -----   Degenerative disc changes of the cervical spine, with mild central canal stenosis and moderate to severe right neural foraminal stenosis. This could be because of her neck pain.   Needs to follow-up with specialist.  Refer to [facility] neuro spine

## 2023-11-21 NOTE — TELEPHONE ENCOUNTER
Rx sent for Naproxen.  Take with food  Will also send to Dr. Kati Banegas to review upon her return next week

## 2023-12-05 ENCOUNTER — OFFICE VISIT (OUTPATIENT)
Dept: OBGYN CLINIC | Facility: CLINIC | Age: 48
End: 2023-12-05
Payer: COMMERCIAL

## 2023-12-05 VITALS
HEIGHT: 65 IN | SYSTOLIC BLOOD PRESSURE: 110 MMHG | DIASTOLIC BLOOD PRESSURE: 70 MMHG | BODY MASS INDEX: 23.89 KG/M2 | WEIGHT: 143.38 LBS

## 2023-12-05 DIAGNOSIS — Z12.4 CERVICAL CANCER SCREENING: ICD-10-CM

## 2023-12-05 DIAGNOSIS — Z01.419 WELL WOMAN EXAM WITH ROUTINE GYNECOLOGICAL EXAM: Primary | ICD-10-CM

## 2023-12-05 PROCEDURE — 88175 CYTOPATH C/V AUTO FLUID REDO: CPT | Performed by: NURSE PRACTITIONER

## 2023-12-05 PROCEDURE — 3078F DIAST BP <80 MM HG: CPT | Performed by: NURSE PRACTITIONER

## 2023-12-05 PROCEDURE — 3008F BODY MASS INDEX DOCD: CPT | Performed by: NURSE PRACTITIONER

## 2023-12-05 PROCEDURE — 3074F SYST BP LT 130 MM HG: CPT | Performed by: NURSE PRACTITIONER

## 2023-12-05 PROCEDURE — 99396 PREV VISIT EST AGE 40-64: CPT | Performed by: NURSE PRACTITIONER

## 2023-12-05 NOTE — PROGRESS NOTES
Here for Routine Annual Exam  Menses have become heavier and more frequent in the last year. She had the Nexplanon place to help with her cycles. She would like it removed and to start the progesterone only pill. Contraception- SO had a vasectomy  Would like a pap    ROS: No Cardiac, Respiratory, GI,  or Neurological symptoms. PE:  GENERAL: well developed, well nourished, in no apparent distress  SKIN: no rashes, no suspicious lesions  HEENT: normal  NECK: supple; no thyroidmegaly, no adenopathy  LUNGS: clear to auscultation  CARDIOVASCULAR: normal S1, S2, RRR  BREASTS: firm, nontendder, no palpable masses or nodes, no nipple discharge, no skin changes, no axillary adenopathy,    ABDOMEN: Soft, non distended; non tender, no masses  GYNE/: External Genitalia: Normal without lesions or erythema                      Vagina: normal without lesions, scant discharge                      Uterus: mid, mobile, non tender, normal size                     Cervix: no lesions or CMT                     Adnexa: non tender, no masses, normal size  EXTREMITIES:  non tender without edema    A/P:   1. Well woman exam with routine gynecological exam  Regular self breast exams recommended  Return for Nexplanon removal    2.  Cervical cancer screening  - ThinPrep PAP with HPV Reflex Request; Future  - ThinPrep PAP with HPV Reflex Request       Return to clinic 1 year for routine exam, or as needed with any concerns or question

## 2023-12-08 ENCOUNTER — HOSPITAL ENCOUNTER (OUTPATIENT)
Dept: ULTRASOUND IMAGING | Facility: HOSPITAL | Age: 48
Discharge: HOME | End: 2023-12-08
Attending: PHYSICIAN ASSISTANT
Payer: COMMERCIAL

## 2023-12-08 DIAGNOSIS — M77.11 LATERAL EPICONDYLITIS OF RIGHT ELBOW: ICD-10-CM

## 2023-12-08 PROCEDURE — 24357 REPAIR ELBOW PERC: CPT | Performed by: PHYSICIAN ASSISTANT

## 2023-12-08 PROCEDURE — 76942 ECHO GUIDE FOR BIOPSY: CPT | Performed by: PHYSICIAN ASSISTANT

## 2023-12-08 NOTE — IMAGING NOTE
DISCHARGE INSTRUCTIONS  Procedure:  R Lateral elbow Tenex  Date:  12/8/23  Radiologist: Dr Katlin Street  General instructions:  No driving for 24 hours after the procedure. Keep bandages and procedure site clean and dry for 3 days after the procedure and remove. May apply ice pack to the procedure site for 20 min as needed for discomfort. May take over-the-counter pain medication such as Tylenol or Advil as directed by the . Avoid submerging in water (Swimming, Tub bath) for 5 days. The Radiology Department will call in 2 weeks for a follow up checkup. Date:  12/22  Time: Before 6PM   Specific patient instruction:  Rest your elbow today (day of the procedure)  Wear the sling for 2 weeks when up and about. May remove for bathing and sleep. Start daily gentle non repetitive/ non weight bearing range of motion exercises on the 3rd day post procedure. Make physical therapy appointment to start in a week after the procedure. May begin stretching exercises as directed by the Physical Therapist  No lifting objects with arm/hand greater than 5 pounds for 6 weeks. After 6 weeks, follow up visit with your ordering physician and you may resume normal activity as tolerated. Contact Radiology RN office at [phone] from 730 am-6 pm Monday through Friday. If area becomes red or hot to touch  Increased swelling. For drainage from site. For temperature over 101 degree F  In case of emergency, contact your physician or go to the nearest Emergency Department. Above instructions reviewed with patient and she verbalized understanding. She was fitted with sling to R arm and tegaderm dressing to procedure site on R lateral elbow is CDI. Pt ambulated to exit and was released to meet her son in the parking garage.

## 2023-12-09 LAB
.: NORMAL
.: NORMAL

## 2023-12-18 ENCOUNTER — OFFICE VISIT (OUTPATIENT)
Facility: CLINIC | Age: 48
End: 2023-12-18
Payer: COMMERCIAL

## 2023-12-18 VITALS
DIASTOLIC BLOOD PRESSURE: 74 MMHG | HEART RATE: 68 BPM | WEIGHT: 143 LBS | OXYGEN SATURATION: 97 % | BODY MASS INDEX: 23.54 KG/M2 | HEIGHT: 65.5 IN | SYSTOLIC BLOOD PRESSURE: 118 MMHG

## 2023-12-18 DIAGNOSIS — M54.12 CERVICAL RADICULOPATHY: Primary | ICD-10-CM

## 2023-12-18 PROCEDURE — 3078F DIAST BP <80 MM HG: CPT | Performed by: NEUROLOGICAL SURGERY

## 2023-12-18 PROCEDURE — 3008F BODY MASS INDEX DOCD: CPT | Performed by: NEUROLOGICAL SURGERY

## 2023-12-18 PROCEDURE — 99203 OFFICE O/P NEW LOW 30 MIN: CPT | Performed by: NEUROLOGICAL SURGERY

## 2023-12-18 PROCEDURE — 3074F SYST BP LT 130 MM HG: CPT | Performed by: NEUROLOGICAL SURGERY

## 2023-12-18 NOTE — PROGRESS NOTES
New patient is here today with c/o Cervical pain-referred by Dr Lauren Ascencio      Pain Scale:  5/10  right side neck       Imagin/9/23--MRI Spine Cervical    Treatments: hx of steroid injections in past is some relief, PT

## 2023-12-18 NOTE — PROGRESS NOTES
[facility]  Neurological Surgery Clinic Note    Sheila Hair [place]  12/30/1975  [id number]  PCP: Venus Navarro MD    REASON FOR VISIT:  Neck pain and R subscapular pain    HISTORY OF PRESENT ILLNESS:  Sheila Stubbs is a(n) 41year old female with neck pain and RUE radiculopathy at times. She states she has had neck pain for years and was originally treated in [place] prior to moving to [place]. She has recently undergone PT with minimal relief. She has no weakness. She has no gait instability. MRI C spine shows a large disc osteophyte complex on the R at C6-7 as well as mild DDD throughout her C spine. Location: neck pain and RUE radiculopathy  Quality: numbness/pain  Severity: progressive  Duration: years, recent progression  Timing: any, worse in a.m.   Context: MRI shows disc osteophyte complex on R at C6-7  Modifying Factors: PT in the past helped but recently has not improved her symptoms   Associated signs/symptoms: pain      PAST MEDICAL HISTORY:  Past Medical History:   Diagnosis Date    Abdominal distention     Abdominal pain     Allergic rhinitis     Anxiety state, unspecified     Back pain     Bad breath     Belching     Bloating     Blood in the stool     Constipation     Depression     Dysmenorrhea     Esophageal reflux     Frequent use of laxatives     Heartburn     Hemorrhoids     High cholesterol     History of depression     History of stomach ulcers     Indigestion     Irregular bowel habits     Loss of appetite     Migraine     Migraines     Nausea     Obesity     Other and unspecified hyperlipidemia     Pain with bowel movements     Problems with swallowing     Sleep disturbance     Stress     Uncomfortable fullness after meals     Weight gain     Weight loss        PAST SURGICAL HISTORY:  Past Surgical History:   Procedure Laterality Date    COLONOSCOPY      COLPOSCOPY, CERVIX W/UPPER ADJACENT VAGINA; W/BIOPSY(S), CERVIX  2008    HPV+    FRACTURE SURGERY repair of nasal fracture    GASTRO - [facility]  8/16    healed ulcers    OTHER      wisdom teeth extraction    OTHER SURGICAL HISTORY      nose repair 2014    UP GI ENDOSCOPY,BALL DIL,30MM N/A 8/9/2016    Procedure: ESOPHAGOGASTRODUODENOSCOPY, POSSIBLE BIOPSY, POSSIBLE POLYPECTOMY 50608;  Surgeon: Nilson Chaudhry MD;  Location: [facility]    UPPER GI ENDO NO BARRETTS  5/16    esophageal ulcer; HH    UPPER GI ENDOSCOPY,BIOPSY N/A 5/17/2016    Procedure: ESOPHAGOGASTRODUODENOSCOPY, POSSIBLE BIOPSY, POSSIBLE POLYPECTOMY 69575;  Surgeon: Nilson Chaudhry MD;  Location: [facility]       FAMILY HISTORY:  family history includes Alcohol and Other Disorders Associated in her father; Anxiety in her maternal grandmother; Breast Cancer in her maternal grandmother; Breast Cancer (age of onset: 58) in her maternal aunt; Cancer in her maternal grandfather and maternal grandmother; Crohn's Disease in her maternal aunt; Depression in her paternal grandmother, son, and another family member; Diabetes in her maternal grandfather, maternal grandmother, paternal grandfather, and paternal grandmother; Heart Attack in her maternal grandfather; Homicide History in her father; Lipids in her maternal grandfather; Other in her son; Psychiatric in her sister; Stroke in her maternal grandfather and maternal grandmother; Suicide History in an other family member. SOCIAL HISTORY:   reports that she quit smoking about 15 years ago. Her smoking use included cigarettes. She has a 18.00 pack-year smoking history. She has never used smokeless tobacco. She reports current alcohol use of about 2.0 standard drinks of alcohol per week. She reports that she does not currently use drugs.     ALLERGIES:  No Known Allergies    MEDICATIONS:  Current Outpatient Medications on File Prior to Visit   Medication Sig Dispense Refill    desvenlafaxine  MG Oral Tablet 24 Hr TAKE 1 TABLET(100 MG) BY MOUTH DAILY 30 tablet 2    lamoTRIgine 150 MG Oral Tab Take 1 tablet (150 mg total) by mouth 2 (two) times daily. 180 tablet 0    SUMAtriptan Succinate 100 MG Oral Tab TAKE 1 TABLET BY MOUTH WITHIN 30 MINUTES OF HEADACHE ONSET 9 tablet 0    OMEPRAZOLE 20 MG Oral Capsule Delayed Release TAKE 1 CAPSULE(20 MG) BY MOUTH TWICE DAILY BEFORE MEALS 180 capsule 2    Ascorbic Acid (VITAMIN C) 250 MG Oral Chew Tab Chew by mouth. Cholecalciferol ( VITAMIN D3) 100 MCG (4000 UT) Oral Cap Take by mouth. omega-3 fatty acids 1000 MG Oral Cap Take 1,000 mg by mouth daily. Etonogestrel (NEXPLANON) 68 MG Subcutaneous Implant Inject into the skin. No current facility-administered medications on file prior to visit. REVIEW OF SYSTEMS:  Review of Systems   All other systems reviewed and are negative. PHYSICAL EXAMINATION:  Neurologic Exam     Mental Status   Oriented to person, place, and time. Cranial Nerves   Cranial nerves II through XII intact. Motor Exam   Muscle bulk: normal  Overall muscle tone: normal  Right arm tone: normal  Left arm tone: normal  Right arm pronator drift: absent  Left arm pronator drift: absent  Right leg tone: normal  Left leg tone: normal    Strength   Strength 5/5 throughout.      Sensory Exam   Light touch normal.   Vibration normal.   Proprioception normal.   Pinprick normal.     Gait, Coordination, and Reflexes     Gait  Gait: normal    Reflexes   Right brachioradialis: 2+  Left brachioradialis: 2+  Right biceps: 2+  Left biceps: 2+  Right triceps: 2+  Left triceps: 2+  Right patellar: 2+  Left patellar: 2+  Right achilles: 2+  Left achilles: 2+  Right : 2+  Left : 2+       IMAGING:  As above    ASSESSMENT:  Cervical radiculopathy      Plan:  Referral to Dr. Joseph Jones for cervical KELLY  Continue PT  F/U after KELLY to discuss possible cervical ADR    [address] DO  Neurological Surgery  [facility]

## 2023-12-28 ENCOUNTER — PATIENT MESSAGE (OUTPATIENT)
Dept: FAMILY MEDICINE CLINIC | Facility: CLINIC | Age: 48
End: 2023-12-28

## 2023-12-28 DIAGNOSIS — M53.3 COCCYXDYNIA: Primary | ICD-10-CM

## 2023-12-29 ENCOUNTER — HOSPITAL ENCOUNTER (OUTPATIENT)
Dept: MAMMOGRAPHY | Age: 48
Discharge: HOME | End: 2023-12-29
Attending: FAMILY MEDICINE
Payer: COMMERCIAL

## 2023-12-29 DIAGNOSIS — Z12.31 SCREENING MAMMOGRAM, ENCOUNTER FOR: ICD-10-CM

## 2023-12-29 PROCEDURE — 77067 SCR MAMMO BI INCL CAD: CPT | Performed by: FAMILY MEDICINE

## 2023-12-29 PROCEDURE — 77063 BREAST TOMOSYNTHESIS BI: CPT | Performed by: FAMILY MEDICINE

## 2023-12-29 NOTE — TELEPHONE ENCOUNTER
Pt called and states she called Dr. Beni Vivar office and they told her they do not handle the issues she is having. Pt wondering if there is a different referral she can get.  Please advise

## 2023-12-29 NOTE — TELEPHONE ENCOUNTER
Patient saw Dr. Sharita Martins for this on 11/4/23 and had x-ray done which showed:  Narrative   PROCEDURE:  XR SACRUM + COCCYX (MIN 2 VIEWS) (CPT=72220)     INDICATIONS:  Pain to tail bone when sitting     COMPARISON:  None. PATIENT STATED HISTORY: (As transcribed by Technologist)  Patient states she has had pain to tail bone when sitting or with certain movements since the end of September 2023. Patient unsure of injury. Impression   CONCLUSION:  There is slight volar subluxation of the coccyx in relation to the sacrum which is likely chronic and degenerative. No acute fracture involving the sacrum or coccyx. SI joints are symmetric.        Would you like to order PT for this or refer to specialist?

## 2023-12-29 NOTE — TELEPHONE ENCOUNTER
From: Zhou Hernandez  To: Junior Soliz  Sent: 12/28/2023 11:02 PM CST  Subject: Tail bone pain    I am reaching out because I scheduled an appointment but the soonest I could schedule it is end of January. Audrey had tailbone pain since end of September. Audrey done ibuprofen, ice, heat, steroid pack, naproxen, did an xray and showed no break. Audrey even looked up streches to do to help w pain. So far nothing has worked. I am still having the exact same pain since September even after doing all the above. What can we do next?   Thank you

## 2023-12-29 NOTE — TELEPHONE ENCOUNTER
Call from patient  Having tailbone pain  Stated Dr Chace Cortes doesn't handle tailbone issues  Is there another referral we can provide?

## 2024-01-02 ENCOUNTER — TELEPHONE (OUTPATIENT)
Dept: FAMILY MEDICINE CLINIC | Facility: CLINIC | Age: 49
End: 2024-01-02

## 2024-01-02 ENCOUNTER — OFFICE VISIT (OUTPATIENT)
Dept: PAIN CLINIC | Facility: CLINIC | Age: 49
End: 2024-01-02
Payer: COMMERCIAL

## 2024-01-02 VITALS — WEIGHT: 143 LBS | SYSTOLIC BLOOD PRESSURE: 116 MMHG | BODY MASS INDEX: 23 KG/M2 | DIASTOLIC BLOOD PRESSURE: 76 MMHG

## 2024-01-02 DIAGNOSIS — R92.30 DENSE BREAST TISSUE: Primary | ICD-10-CM

## 2024-01-02 DIAGNOSIS — M54.12 RADICULITIS, CERVICAL: ICD-10-CM

## 2024-01-02 DIAGNOSIS — M48.02 FORAMINAL STENOSIS OF CERVICAL REGION: Primary | ICD-10-CM

## 2024-01-02 DIAGNOSIS — Z12.39 SCREENING BREAST EXAMINATION: ICD-10-CM

## 2024-01-02 PROCEDURE — 3078F DIAST BP <80 MM HG: CPT | Performed by: PHYSICIAN ASSISTANT

## 2024-01-02 PROCEDURE — 3074F SYST BP LT 130 MM HG: CPT | Performed by: PHYSICIAN ASSISTANT

## 2024-01-02 PROCEDURE — 99204 OFFICE O/P NEW MOD 45 MIN: CPT | Performed by: PHYSICIAN ASSISTANT

## 2024-01-02 NOTE — PROGRESS NOTES
Subjective:   Patient ID: Vita Gillis is a 48 year old female.    HPI    History/Other:   Review of Systems  Current Outpatient Medications   Medication Sig Dispense Refill   • desvenlafaxine  MG Oral Tablet 24 Hr TAKE 1 TABLET(100 MG) BY MOUTH DAILY 30 tablet 2   • lamoTRIgine 150 MG Oral Tab Take 1 tablet (150 mg total) by mouth 2 (two) times daily. 180 tablet 0   • SUMAtriptan Succinate 100 MG Oral Tab TAKE 1 TABLET BY MOUTH WITHIN 30 MINUTES OF HEADACHE ONSET 9 tablet 0   • OMEPRAZOLE 20 MG Oral Capsule Delayed Release TAKE 1 CAPSULE(20 MG) BY MOUTH TWICE DAILY BEFORE MEALS 180 capsule 2   • Ascorbic Acid (VITAMIN C) 250 MG Oral Chew Tab Chew by mouth.     • Cholecalciferol (HM VITAMIN D3) 100 MCG (4000 UT) Oral Cap Take by mouth.     • omega-3 fatty acids 1000 MG Oral Cap Take 1,000 mg by mouth daily.     • Etonogestrel (NEXPLANON) 68 MG Subcutaneous Implant Inject into the skin.       Allergies:No Known Allergies    Objective:   Physical Exam  Constitutional:          Assessment & Plan:   No diagnosis found.    No orders of the defined types were placed in this encounter.      Meds This Visit:  Requested Prescriptions      No prescriptions requested or ordered in this encounter       Imaging & Referrals:  None    Location of Pain: right side neck and upper back    Date Pain Began: 2010          Work Related:   No        Receiving Work Comp/Disability:   No    Numeric Rating Scale:  Pain at Present:  5/10                                                                                                            (No Pain) 0  to  10 (Worst Pain)                 Minimum Pain:   5  Maximum Pain  8    Distribution of Pain:    right    Quality of Pain:   aching, numbness, sharp/stabbing, throbbing, and tingling    Origin of Pain:    Degenerative and Other bulging disc C6/7    Aggravating Factors:    Other constant    Past Treatments for Current Pain Condition:   Physical Therapy, NSAIDS, and Other  injections    Prior diagnostic testing for your pain:  MRI

## 2024-01-02 NOTE — PATIENT INSTRUCTIONS
Refill policies:    Allow 2-3 business days for refills; controlled substances may take longer.  Contact your pharmacy at least 5 days prior to running out of medication and have them send an electronic request or submit request through the “request refill” option in your [employer] account.  Refills are not addressed on weekends; covering physicians do not authorize routine medications on weekends.  No narcotics or controlled substances are refilled after noon on Fridays or by on call physicians.  By law, narcotics must be electronically prescribed.  A 30 day supply with no refills is the maximum allowed.  If your prescription is due for a refill, you may be due for a follow up appointment.  To best provide you care, patients receiving routine medications need to be seen at least once a year.  Patients receiving narcotic/controlled substance medications need to be seen at least once every 3 months.  In the event that your preferred pharmacy does not have the requested medication in stock (e.g. Backordered), it is your responsibility to find another pharmacy that has the requested medication available.  We will gladly send a new prescription to that pharmacy at your request.    Scheduling Tests:    If your physician has ordered radiology tests such as MRI or CT scans, please contact Central Scheduling at [phone] right away to schedule the test.  Once scheduled, the [facility] Centralized Referral Team will work with your insurance carrier to obtain pre-certification or prior authorization.  Depending on your insurance carrier, approval may take 3-10 days.  It is highly recommended patients assure they have received an authorization before having a test performed.  If test is done without insurance authorization, patient may be responsible for the entire amount billed.      Precertification and Prior Authorizations:  If your physician has recommended that you have a procedure or additional testing performed the [facility]  Centralized Referral Team will contact your insurance carrier to obtain pre-certification or prior authorization.    You are strongly encouraged to contact your insurance carrier to verify that your procedure/test has been approved and is a COVERED benefit.  Although the [facility] Centralized Referral Team does its due diligence, the insurance carrier gives the disclaimer that \"Although the procedure is authorized, this does not guarantee payment.\"    Ultimately the patient is responsible for payment.   Thank you for your understanding in this matter.  Paperwork Completion:  If you require FMLA or disability paperwork for your recovery, please make sure to either drop it off or have it faxed to our office at [phone]. Be sure the form has your name and date of birth on it.  The form will be faxed to our Forms Department and they will complete it for you.  There is a 25$ fee for all forms that need to be filled out.  Please be aware there is a 10-14 day turnaround time.  You will need to sign a release of information (NGA) form if your paperwork does not come with one.  You may call the Forms Department with any questions at [phone].  Their fax number is [phone].

## 2024-01-02 NOTE — TELEPHONE ENCOUNTER
----- Message from SHELL Darling sent at 12/31/2023  1:24 PM CST -----  Normal mammogram   May benefit from breast MRI due to dense breast tissue. If interested, can place order

## 2024-01-02 NOTE — PROGRESS NOTES
Patient: Vita Gillis  Medical Record Number: [id number]  Site: [facility]  Referring Physician:  SOLE Beckford  PCP: Dr. Bacon    Dear Dr. Beckford:    Thank you very much for requesting this consultation. I had the opportunity to evaluate and initiate care for your patient today, as per your request.    HISTORY OF CHIEF COMPLAINT:      Vita Gillis is a 48 year old female, who complains of neck and radiating R scapular and UE pain along triceps with n/t to hand and 2nd - 3rd digits.    Patient is here today with pain in above described distribution that began atraumatically years ago.  Initially, simply lived with it, though eventually, sought treatment with chiropractor.  While helpful initially, it became ineffective, and was sent for PT.  This was ineffective, and underwent injections, to excellent relief.      1.5 years ago, pain began to return, and returned to chiropractor.  This was helpful, though not curative, and again, tried PT from 9/2023 through 10/2023, without resolution of pain.  HEP has been ineffective, and was sent for MRI.  This did reveal significant right-sided foraminal stenosis at the C6-7, and was evaluated by neurosurgery.  Prior to consideration of cervical surgery, was sent for cervical epidural steroid injection.    VAS Pain Score:  /10    Hand Dominance: right  Loss of dexterity: No  Dropping things: No    Aggravating Factors: Relieving Factors:   ROM C spine   Lifting  Limiting activity      Past Treatment Attempted/Patient’s Response:  As above     Past Medical History:   Diagnosis Date    Abdominal distention     Abdominal pain     Allergic rhinitis     Anxiety state, unspecified     Back pain     Bad breath     Belching     Bloating     Blood in the stool     Constipation     Depression     Dysmenorrhea     Esophageal reflux     Frequent use of laxatives     Heartburn     Hemorrhoids     High cholesterol     History of depression     History of  stomach ulcers     Indigestion     Irregular bowel habits     Loss of appetite     Migraine     Migraines     Nausea     Obesity     Other and unspecified hyperlipidemia     Pain with bowel movements     Problems with swallowing     Sleep disturbance     Stress     Uncomfortable fullness after meals     Weight gain     Weight loss       Past Surgical History:   Procedure Laterality Date    COLONOSCOPY      COLPOSCOPY, CERVIX W/UPPER ADJACENT VAGINA; W/BIOPSY(S), CERVIX  2008    HPV+    FRACTURE SURGERY      repair of nasal fracture    GASTRO - [facility]G  8/16    healed ulcers    OTHER      wisdom teeth extraction    OTHER SURGICAL HISTORY      nose repair 2014    UP GI ENDOSCOPY,BALL DIL,30MM N/A 8/9/2016    Procedure: ESOPHAGOGASTRODUODENOSCOPY, POSSIBLE BIOPSY, POSSIBLE POLYPECTOMY 01841;  Surgeon: Sukh Samayoa MD;  Location: [facility]    UPPER GI ENDO NO BARRETTS  5/16    esophageal ulcer; HH    UPPER GI ENDOSCOPY,BIOPSY N/A 5/17/2016    Procedure: ESOPHAGOGASTRODUODENOSCOPY, POSSIBLE BIOPSY, POSSIBLE POLYPECTOMY 20691;  Surgeon: Sukh Samayoa MD;  Location: [facility]      Family History   Problem Relation Age of Onset    Alcohol and Other Disorders Associated Father     Homicide History Father         father strangled mother, patient age 8    Psychiatric Sister         postpartum depression    Depression Son     Other (Other) Son         Duchennes muscular dystrophy    Cancer Maternal Grandmother         lung, brain    Breast Cancer Maternal Grandmother 52    Diabetes Maternal Grandmother     Stroke Maternal Grandmother     Anxiety Maternal Grandmother         undiagnosed    Cancer Maternal Grandfather     Stroke Maternal Grandfather     Lipids Maternal Grandfather     Diabetes Maternal Grandfather     Heart Attack Maternal Grandfather     Diabetes Paternal Grandmother     Depression Paternal Grandmother     Diabetes Paternal Grandfather     Crohn's Disease Maternal Aunt     Breast Cancer  Maternal Aunt 62    Depression Other         pat uncle    Suicide History Other         pat uncle completed      Social History     Socioeconomic History    Marital status:    Tobacco Use    Smoking status: Former     Packs/day: 1.00     Years: 18.00     Additional pack years: 0.00     Total pack years: 18.00     Types: Cigarettes     Quit date: 1/23/2008     Years since quitting: 15.9    Smokeless tobacco: Never    Tobacco comments:     non-smoker   Vaping Use    Vaping Use: Never used   Substance and Sexual Activity    Alcohol use: Yes     Alcohol/week: 2.0 standard drinks of alcohol     Types: 2 Standard drinks or equivalent per week     Comment: socially    Drug use: Not Currently    Sexual activity: Yes     Partners: Male     Birth control/protection: Vasectomy, Implant   Other Topics Concern    Caffeine Concern Yes     Comment: 1 cup of coffee x day    Exercise Yes     Comment: 2 x week, cardio    Seat Belt Yes      Current Medications:  Current Outpatient Medications   Medication Sig Dispense Refill    desvenlafaxine  MG Oral Tablet 24 Hr TAKE 1 TABLET(100 MG) BY MOUTH DAILY 30 tablet 2    lamoTRIgine 150 MG Oral Tab Take 1 tablet (150 mg total) by mouth 2 (two) times daily. 180 tablet 0    SUMAtriptan Succinate 100 MG Oral Tab TAKE 1 TABLET BY MOUTH WITHIN 30 MINUTES OF HEADACHE ONSET 9 tablet 0    OMEPRAZOLE 20 MG Oral Capsule Delayed Release TAKE 1 CAPSULE(20 MG) BY MOUTH TWICE DAILY BEFORE MEALS 180 capsule 2    omega-3 fatty acids 1000 MG Oral Cap Take 1,000 mg by mouth daily.      Etonogestrel (NEXPLANON) 68 MG Subcutaneous Implant Inject into the skin.          Functional Assessment: Patient reports that they are able to complete all of their ADL's such as eating, bathing, using the toilet, dressing and getting up from a bed or a chair independently.    Work History:  The patient currently works full time as  worker.       REVIEW OF SYSTEMS:   10 point review of systems is otherwise  negative,unless otherwise in HPI.      Radiology/Lab Test Reviewed:  MRI C spine 11/9/23:    C2-C3:  No significant disc/facet abnormality, spinal stenosis, or foraminal narrowing.   C3-C4:  Mild disc desiccation mild disc height loss.  There is a mild right paracentral disc protrusion with slight superior and inferior extrusion measuring approximately 0.8 x 0.2 x 0.9 cm in craniocaudal, AP and transverse dimensions respectively.    There is mild effacement of the ventral thecal sac without significant central canal stenosis.  There is secondary mild right neural foraminal stenosis.   C4-C5:  Mild disc desiccation and minimal annular disc bulge.  No significant stenosis.   C5-C6:  Mild disc desiccation and minimal annular disc bulge.  No significant stenosis.   C6-C7:  Moderate disc desiccation with disc height loss and broad-based disc bulge/osteophyte complex, including bilateral uncovertebral joint disc/osteophyte complexes, greater on the right relative to the left.  There is secondary mild central canal stenosis and moderate to severe right neural foraminal stenosis.  No significant left foraminal stenosis.   C7-T1:  No significant disc/facet abnormality, spinal stenosis, or foraminal narrowing.         CBC:    Lab Results   Component Value Date    WBC 5.4 10/09/2023    WBC 8.4 11/28/2022    WBC 6.9 07/19/2022     Lab Results   Component Value Date    HEMOGLOBIN 12.0 10/06/2017     Lab Results   Component Value Date    .0 10/09/2023    .0 11/28/2022    .0 07/19/2022         PHYSICAL EXAMIMATION   PHYSICAL EXAMINATION: Vita Gillis is a 48 year old female who is observed sitting comfortably on a chair in the exam room alert and oriented times three. She looks consistent with her stated age.    Ht Readings from Last 1 Encounters:   12/18/23 65.5\"     Wt Readings from Last 1 Encounters:   12/18/23 143 lb (64.9 kg)     The patient is well developed, well nourished, normal body habitus,  well muscled. She moves independently from sitting to standing with ease.       Coordination:  Well coordinated; able to engage in rapid alternating movements bilateral upper extremities    Tandem Walk: Able    ROM Cervical Spine:  See chart below:  Motion Right (+ or -) Left (+ or -)   Cervical flexion - -   Cervical extension + -   Cervical lateral bending + -   Cervical rotation + -     Integument:  Skin over area of cervical spine intact; no erythema, rashes, excoriations, lesions noted    Palpation:  See chart below:  Palpation of Right (+ or -) Left (+ or -)   Cervical Facets - -   Thoracic Facets - -   Paraspinal - -   Trapezius - -   Scapula - -   Occipital - -     Strength:  Strength of bilateral upper extremities grossly intact; 5/5 throughout    Sensation:  Normal sensation noted to light touch and pressure throughout bilateral upper extremities.    Tests:  Test Right (+ or -) Left (+ or -)   Spurling + -   Davies’s Test     Clonus       HEAD/NECK: Head is normocephalic, neck supple  EYES: EOMI, BELEM  LYMPH EXAM: There is no lymph edema in either lower extremity.  VASCULAR EXAM: Radial pulses are normal bilaterally, with good distal perfusion. No clubbing or cyanosis.  HEART: normal, regular, S1 and S2  LUNGS: CTA  MUSCULOSKELETAL: Smooth, pain-free ROM to bilateral shoulders,elbows, wrists and digits.    Do you have any known blood/bleeding disorders?  No  Does patient currently take blood thinners?   None  Does patient currently take any antibiotics?   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  Has found some improvement with chiropractic care, no meaningful relief with physical therapy or HEP.  Has been evaluated by neurosurgery, and prior to consideration of surgery (anterior disc replacement) was sent for cervical epidural steroid injection.  Risk and benefits were reviewed in detail, and we will proceed with RAMSEY with right bias at her earliest convenience, pending insurance approval.    Plan: Right biased RAMSEY #1.  Follow-up 2 to 3 weeks.    The patient indicates understanding of these issues and agrees to the plan.      Thank you very much.     Respectfully yours,    MICKI Hickman

## 2024-01-03 ENCOUNTER — TELEPHONE (OUTPATIENT)
Dept: PAIN CLINIC | Facility: CLINIC | Age: 49
End: 2024-01-03

## 2024-01-03 DIAGNOSIS — M54.12 CERVICAL RADICULITIS: Primary | ICD-10-CM

## 2024-01-03 NOTE — TELEPHONE ENCOUNTER
Prior authorization request completed for: RAMSEY  Authorization # not require  Pre-D: not recommended   Exclusions/Restrictions: no   Covered Benefit: yes  Authorization dates: n/a  CPT codes approved: 07501  Number of visits/dates of service approved: 1  Physician: preston  Location: [facility]   Call Ref#: [id number]  Representative Name: grant  Insurance Carrier: [phone]    Patient can be scheduled. Routed to Navigator.

## 2024-01-03 NOTE — TELEPHONE ENCOUNTER
Patient advised of insurance approval to proceed with injections and is agreeable to scheduling. Patient scheduled for procedure, pre-procedure instructions reviewed. Patient prefers conscious  sedation. Reviewed sedation instructions including Fasting 8 hours prior, Required and No Covid Test Required. Patient encouraged but not required to hold Omega-3 for 5 days and NSAIDS for 24 hours prior to procedure. Patient verbalized understanding of instructions, no further needs at this time.    Patient mentioned that she is scheduled for birth control removal on her arm on 01/16 if there will be any issues with her injection scheduled that week? Advised patient that would not interfere with her injection. Patient .       [facility]  PRE-PROCEDURE INSTRUCTIONS WITH IV SEDATION     Procedure: RAMSEY    Appointment Date: 01/18/2024      Check-In Time: 07:00 AM     Follow-Up Date/Time with MICKI Charles : 02/01/2024 @ 03:30 PM    Prior to the procedure:  Please update us prior to the procedure if you are experiencing any symptoms of infection such as cough, fever, chills, urinary symptoms, or have recently been prescribed antibiotics, have open wounds, have recently had surgery or dental procedures.    Day of Procedure:  Do not eat or drink anything (including water) 8 hours prior to your procedure.  If you take morning blood pressure medication or oral diabetic medication, please take with a small sip of water.  You are required to have a responsible adult drive you home after your procedure. You may not take a cab or ride share unless you have a responsible adult with you in the cab or ride share.  A family member or friend is required to stay in our waiting room or hospital garage because of the sedation you will receive, you may be sleepy and forgetful. You may not remember anything told to you after your procedure including discharge instructions. Please note: children are not allowed in the  holding area so please make appropriate arrangements.  Any additional family members and friends will need to remain in the surgical waiting room or hospital garage for the duration of your procedure. *If your family member or friend elects to wait in the garage, they must leave a cell phone number with the lab staff in case they need to be reached.  Please park in the [facility] garage and follow the signs to the [facility].  Please bring your Insurance Card, Photo ID, List of Current Medications and Referral (if applicable) to your appointment. Check in at [address]) outpatient registration in the [facility].  Please note-No prescriptions will be written by Pain Clinic in OR on the day of procedure. If you require a refill of medications, please contact the office 48 hours prior to your procedure.  If you have an implanted Spinal Cord or Peripheral Nerve Stimulator: Please remember to turn device off for procedure    *If you are fasting, you may take blood pressure and thyroid medications with a small sip of water the day of your procedure.   *If you are diabetic, your glucose must be within a normal range for you. If you are fasting, you should check your glucose levels and adjust with medication if needed.    Medication Hold:  Number of days you need to be off for the following medications:    Aggrenox 10 days   Agrylin (Anagrelide) 10 days  Brilinta (Ticagrelor) 7 days  Imbruvica (Ibrutinib) 3 days   Enbrel (Etanercept) 24 hours   Fragmin (Dalteparin) 24 hours   Pletal (Cilostazol) 7 days  Effient (Prasugrel) 7 days  Pradaxa 10 days  Trental 7 days  Eliquis (Apixaban) 3 days  Xarelto (Rivaroxaban) 3 days  Lovenox (Enoxaparin) 24 hours  Aspirin  Greater than 81mg but less than 325mg   5 days  325mg and greater                  7 days  (*81 mg      24 hours preferred, but not required)  Coumadin       5 days  Procedure may be cancelled if INR is  elevated.   Excedrin (with aspirin) 7 days  Plavix (Clopidogrel)                             7 days    NSAIDs: 24 hours preferred, but not required      Ibuprofen (Motrin, Advil, Vicoprofen), Naproxen (Naprosyn, Aleve), Piroxcam (Feldene), Meloxicam (Mobic), Oxaprozin (Daypro), Diclofenac (Voltaren), Indomethacin (Indocin), Etodolac (Lodine), Nabumetone (Relafen), Celebrex (Celecoxib)           HERBAL SUPPLEMENTS  5 days preferred, but not required  Fish oil, krill oil, Omega-3, Vascepa, Vitamin E, Turmeric, Garlic                       Insurance Authorization:   Most insurances are now requiring a preauthorization for all procedures.     Please contact your insurance carrier to determine what your financial responsibility will be for the procedure(s).    Cancellation/Rescheduling Appointment:   In the event you need to cancel or reschedule your appointment, you must notify the office 24 hours prior.    Post-procedure instructions:        Please schedule a follow up visit within 2 to 4 weeks after your last procedure date   Please call our office with any questions or concerns before or after your procedure at [phone], #2.  If you are a diabetic, please increase the frequency of your glucose monitoring after the procedure as this may cause a temporary increase in your blood sugar. Contact your primary care physician if your blood sugar rises as you may require some medication adjustment.        It is normal to have increased pain at injection site for up to 3-5 days after procedure, you can        use heat or ice (20 minutes on 20 minutes off) for comfort.

## 2024-01-04 NOTE — TELEPHONE ENCOUNTER
Patient advised and verbalized understanding  Patient would like to proceed with MBI test  Patient states she is sure her insurance will cover this test  Order placed

## 2024-01-08 ENCOUNTER — OFFICE VISIT (OUTPATIENT)
Dept: ORTHOPEDICS CLINIC | Facility: CLINIC | Age: 49
End: 2024-01-08
Payer: COMMERCIAL

## 2024-01-08 VITALS
WEIGHT: 143 LBS | BODY MASS INDEX: 23.82 KG/M2 | DIASTOLIC BLOOD PRESSURE: 78 MMHG | OXYGEN SATURATION: 98 % | HEART RATE: 68 BPM | SYSTOLIC BLOOD PRESSURE: 116 MMHG | RESPIRATION RATE: 16 BRPM | HEIGHT: 65 IN

## 2024-01-08 VITALS — HEIGHT: 65 IN | WEIGHT: 143 LBS | BODY MASS INDEX: 23.82 KG/M2

## 2024-01-08 DIAGNOSIS — M53.3 SACRAL BACK PAIN: Primary | ICD-10-CM

## 2024-01-08 DIAGNOSIS — M43.17 SPONDYLOLISTHESIS OF LUMBOSACRAL REGION: ICD-10-CM

## 2024-01-08 DIAGNOSIS — M77.10 LATERAL EPICONDYLITIS, UNSPECIFIED LATERALITY: Primary | ICD-10-CM

## 2024-01-08 PROCEDURE — 3074F SYST BP LT 130 MM HG: CPT | Performed by: FAMILY MEDICINE

## 2024-01-08 PROCEDURE — 3008F BODY MASS INDEX DOCD: CPT | Performed by: FAMILY MEDICINE

## 2024-01-08 PROCEDURE — 99204 OFFICE O/P NEW MOD 45 MIN: CPT | Performed by: FAMILY MEDICINE

## 2024-01-08 PROCEDURE — 3078F DIAST BP <80 MM HG: CPT | Performed by: FAMILY MEDICINE

## 2024-01-08 RX ORDER — DICLOFENAC SODIUM 75 MG/1
75 TABLET, DELAYED RELEASE ORAL 2 TIMES DAILY
Qty: 28 TABLET | Refills: 1 | Status: SHIPPED | OUTPATIENT
Start: 2024-01-08

## 2024-01-08 NOTE — PROGRESS NOTES
Clinic Note [facility]     Assessment/Plan:  48 year old female    Right elbow lateral epicondylitis-status post Tenex.  She has not noticed any significant relief.  She will continue in therapy and try dry needling.  Will have her follow-up in 6 weeks to monitor her recovery.  Right elbow medial epicondylitis-resolved.    Follow Up: 6 weeks    Diagnostic Studies:  No x-rays taken today.    Physical Exam:    Ht 5' 5\" (1.651 m)   Wt 143 lb (64.9 kg)   LMP  (LMP Unknown)   BMI 23.80 kg/m²     Constitutional: NAD. AOx3. Well-developed and Well-nourished.   Psychiatric: Normal mood/ affect/ behavior. Judgment and thought content normal.     Right upper Extremity:   Inspection: Skin Intact. No skin lesions. No gross deformity.   Palpation:  Tender to palpation over the lateral epicondyle.  Pain with resisted wrist extension and middle finger extension.     Motion: Elbow: normal bilateral symmetric ext/flex  Wrist: normal bilateral symmetric ext/flex/sup/pro  Finger: full composite fist       CC: Follow - Up (RT ELBOW; STILL HAVING PAIN AFTER TENEX )        HPI: This 48 year old female presents with complaints of pain to the right elbow.  Patient states it hurts with certain activities such as gripping or grasping.  The pain is mostly along the lateral elbow.  Its been bothering them for 6 months.  They rate the pain moderate. Patient has tried anti-inflammatories and bracing as well as a Medrol Dosepak.     Interval history: Patient underwent Tenex 4 weeks ag  She has not noticed significant relief.    Past Medical History:   Diagnosis Date    Abdominal distention     Abdominal pain     Allergic rhinitis     Anxiety state, unspecified     Back pain     Bad breath     Belching     Bloating     Blood in the stool     Constipation     Depression     Dysmenorrhea     Esophageal reflux     Frequent use of laxatives     Heartburn     Hemorrhoids     High cholesterol     History of depression     History of stomach  ulcers     Indigestion     Irregular bowel habits     Loss of appetite     Migraine     Migraines     Nausea     Obesity     Other and unspecified hyperlipidemia     Pain with bowel movements     Problems with swallowing     Sleep disturbance     Stress     Uncomfortable fullness after meals     Weight gain     Weight loss      Past Surgical History:   Procedure Laterality Date    COLONOSCOPY      COLPOSCOPY, CERVIX W/UPPER ADJACENT VAGINA; W/BIOPSY(S), CERVIX  2008    HPV+    FRACTURE SURGERY      repair of nasal fracture    GASTRO - [facility]  8/16    healed ulcers    OTHER      wisdom teeth extraction    OTHER SURGICAL HISTORY      nose repair 2014    UP GI ENDOSCOPY,BALL DIL,30MM N/A 8/9/2016    Procedure: ESOPHAGOGASTRODUODENOSCOPY, POSSIBLE BIOPSY, POSSIBLE POLYPECTOMY 25524;  Surgeon: Sukh Samayoa MD;  Location: [facility]    UPPER GI ENDO NO BARRETTS  5/16    esophageal ulcer;     UPPER GI ENDOSCOPY,BIOPSY N/A 5/17/2016    Procedure: ESOPHAGOGASTRODUODENOSCOPY, POSSIBLE BIOPSY, POSSIBLE POLYPECTOMY 39190;  Surgeon: Sukh Samayoa MD;  Location: [facility]     Current Outpatient Medications   Medication Sig Dispense Refill    Acetaminophen 500 MG Oral Cap Take 2 capsules (1,000 mg total) by mouth every 6 (six) hours for 14 days. 50 capsule 1    diclofenac 75 MG Oral Tab EC Take 1 tablet (75 mg total) by mouth 2 (two) times daily. 28 tablet 1    desvenlafaxine  MG Oral Tablet 24 Hr TAKE 1 TABLET(100 MG) BY MOUTH DAILY 30 tablet 2    lamoTRIgine 150 MG Oral Tab Take 1 tablet (150 mg total) by mouth 2 (two) times daily. 180 tablet 0    SUMAtriptan Succinate 100 MG Oral Tab TAKE 1 TABLET BY MOUTH WITHIN 30 MINUTES OF HEADACHE ONSET 9 tablet 0    OMEPRAZOLE 20 MG Oral Capsule Delayed Release TAKE 1 CAPSULE(20 MG) BY MOUTH TWICE DAILY BEFORE MEALS 180 capsule 2    omega-3 fatty acids 1000 MG Oral Cap Take 1,000 mg by mouth daily.      Etonogestrel (NEXPLANON) 68 MG Subcutaneous Implant Inject  into the skin.       No Known Allergies  Family History   Problem Relation Age of Onset    Alcohol and Other Disorders Associated Father     Homicide History Father         father strangled mother, patient age 8    Psychiatric Sister         postpartum depression    Depression Son     Other (Other) Son         Duchennes muscular dystrophy    Cancer Maternal Grandmother         lung, brain    Breast Cancer Maternal Grandmother 52    Diabetes Maternal Grandmother     Stroke Maternal Grandmother     Anxiety Maternal Grandmother         undiagnosed    Cancer Maternal Grandfather     Stroke Maternal Grandfather     Lipids Maternal Grandfather     Diabetes Maternal Grandfather     Heart Attack Maternal Grandfather     Diabetes Paternal Grandmother     Depression Paternal Grandmother     Diabetes Paternal Grandfather     Crohn's Disease Maternal Aunt     Breast Cancer Maternal Aunt 62    Depression Other         pat uncle    Suicide History Other         pat uncle completed     Social History     Occupational History    Not on file   Tobacco Use    Smoking status: Former     Packs/day: 1.00     Years: 18.00     Additional pack years: 0.00     Total pack years: 18.00     Types: Cigarettes     Quit date: 1/23/2008     Years since quitting: 15.9    Smokeless tobacco: Never    Tobacco comments:     non-smoker   Vaping Use    Vaping Use: Never used   Substance and Sexual Activity    Alcohol use: Yes     Alcohol/week: 2.0 standard drinks of alcohol     Types: 2 Standard drinks or equivalent per week     Comment: socially    Drug use: Not Currently    Sexual activity: Yes     Partners: Male     Birth control/protection: Vasectomy, Implant          Review of Systems (negative unless bolded):  General: fevers, chills, fatigue  CV:  chest pain, palpitations, leg swelling  Msk: bodyaches, neck pain, neck stiffness  Skin: rashes, open wounds, nonhealing ulcers  Hem: bleeds easily, bruise easily, immunocompromised  Neuro: dizziness,  light headedness, headaches  Psych: anxious, depressed, anger issues      Arlette Greenfield PA-C  Hand, Wrist, & Elbow Surgery  Physician Assistant to Dr. Jeffery Osborn  [facility]  [address] [place] [address], [address], [email or web address]  [email or web address]  t: [phone]  f: [phone]

## 2024-01-08 NOTE — H&P
Sports Medicine Clinic Note     Subjective:    Chief Complaint: Tailbone pain.    Chief Complaint   Patient presents with    Other     Tailbone pain since September.      History of Present Illness: This is a pleasant 48 year old female presenting with tailbone pain for the last approx 4 months.     Inciting event: No acute trauma, pain began after a road tripped where she was in a seated position for over 5 hours.  Previous treatments and outcomes: The patient has not done PT. Tx under guidance of PT as included sacral pillow and gentle stretching/HEP without relief. She has also tried a Medrol dosepak with minimal improvement.  Associated symptoms: No reported numbness, tingling, or other neurological symptoms.    Objective:    General Inspection: Overall normal gait and posture observed. Appears uncomfortable in sitting position and when going from sit to stand.   Specific Inspection: No obvious visible deformity, swelling about the lumbosacral spine.   Palpation: Significant tenderness to palpation along the entire length of the coccyx. No significant lumbar spine ttp. Mild sacral tenderness, exacerbated with sacral rocking motion localizes to coccyx. No obvious abnormal mobility.   Range of Motion: Able to flex and extend the lower spine without significant pain or limitations.   Neurovascular Examination: Lower extremity strength, sensation intact.   Special Tests: Negative SLR and femoral stretch testing bilaterally.    Imaging:    Radiographs of the sacrum/coccyx were personally reviewed in the office. No fracture or significant dislocation is seen. Possible slight volar subluxation of the coccyx observed. Bones otherwise show normal alignment and architecture. Soft tissue are normal.    Previous MRI lumbar spine dated 11/22/21 with evidence of mild L5/S1 spondylolisthesis. There is some evidence of bilateral foraminal stenosis from L4-S1 with spondylosis throughout the lumbar spine.    Assessment:    Sacral  back pain    Spondylolisthesis of lumbosacral region     Plan:    The following non-operative treatment plan was discussed and outlined with the patient:    Imaging: MRI ordered to further evaluate. Additional imaging such as updated lumbar MRI may be considered to further evaluate the condition in the future, I do think this is possibly a referred issue given the clinical history however exam does localize very much to the coccyx and will evaluate this first.  Therapy: Referral to physical therapy for targeted exercises and modalities was deferred today.  Medications: Recommend OTC options like Tylenol for pain management. NSAID medications such as diclofenac (Cataflam, Voltaren) were also advised to be taken as needed with Tylenol for breakthrough pain.  Bracing/Casting: A lumbosacral orthosis was recommended for comfort and support.  Procedures: Discussed the option of in-office procedures like steroid injections to augment care and alleviate pain once MRI further characterizes her primary pain generator.  Activity Recommendations: Advised modifications in activities or exercises tailored to the condition.    Follow-Up: Tentatively scheduled once MRI is complete or sooner as clinically indicated.    Jefferson Gonzalez DO, CAM   Primary Care Sports Medicine    Department of Orthopaedic Surgery  [address] [address]   [address] [place] [id number]    t: [phone]  f: [phone]      [facility]

## 2024-01-16 ENCOUNTER — OFFICE VISIT (OUTPATIENT)
Dept: OBGYN CLINIC | Facility: CLINIC | Age: 49
End: 2024-01-16
Payer: COMMERCIAL

## 2024-01-16 VITALS
BODY MASS INDEX: 24.57 KG/M2 | HEIGHT: 65 IN | DIASTOLIC BLOOD PRESSURE: 62 MMHG | WEIGHT: 147.5 LBS | HEART RATE: 60 BPM | SYSTOLIC BLOOD PRESSURE: 116 MMHG

## 2024-01-16 DIAGNOSIS — Z30.46 ENCOUNTER FOR REMOVAL OF SUBDERMAL CONTRACEPTIVE IMPLANT: Primary | ICD-10-CM

## 2024-01-16 PROCEDURE — 11982 REMOVE DRUG IMPLANT DEVICE: CPT | Performed by: NURSE PRACTITIONER

## 2024-01-16 PROCEDURE — 3078F DIAST BP <80 MM HG: CPT | Performed by: NURSE PRACTITIONER

## 2024-01-16 PROCEDURE — 3074F SYST BP LT 130 MM HG: CPT | Performed by: NURSE PRACTITIONER

## 2024-01-16 PROCEDURE — 3008F BODY MASS INDEX DOCD: CPT | Performed by: NURSE PRACTITIONER

## 2024-01-16 RX ORDER — KETOCONAZOLE 20 MG/ML
SHAMPOO TOPICAL
COMMUNITY
Start: 2023-12-27

## 2024-01-16 RX ORDER — KETOCONAZOLE 20 MG/G
CREAM TOPICAL
COMMUNITY
Start: 2023-12-27

## 2024-01-16 RX ORDER — ACETAMINOPHEN AND CODEINE PHOSPHATE 120; 12 MG/5ML; MG/5ML
0.35 SOLUTION ORAL DAILY
Qty: 84 TABLET | Refills: 3 | Status: SHIPPED | OUTPATIENT
Start: 2024-01-16 | End: 2025-01-15

## 2024-01-16 RX ORDER — CLOBETASOL PROPIONATE 0.5 MG/G
OINTMENT TOPICAL
COMMUNITY
Start: 2023-08-31

## 2024-01-16 RX ORDER — CLOBETASOL PROPIONATE 0.46 MG/ML
SOLUTION TOPICAL
COMMUNITY
Start: 2023-12-27

## 2024-01-16 NOTE — PROGRESS NOTES
S:  Patient desires implant removal, she was counseled on various alternative methods of contraception, she now desires oral contraceptives.    O:  The patient was consented for the procedure, all risks and benefits discussed.   The implant device was palpated in her left arm. The site was marked with a marking pen then cleansed with betadyne solution. The area was then anesthesized with 1% lidocaine. A small incision near the distal end of the device was made with an 11 blade scalpel. The implant was pushed to the surface of the skin and with careful sharp dissection the tip was exposed. The tip of the implant was then grasped with a small hemostat. The device was then removed without difficulty. The incision was dressed with sterile dressings.     A/P:  1. Encounter for removal of subdermal contraceptive implant  - Nexplanon Removal Only [83910]    Rx sent for Ortho Micronor 1 pill PO daily #84 with 3 refills    Pt instructed to call with any bleeding, pain, swelling, fever, or unusual drainage at site.

## 2024-01-18 ENCOUNTER — APPOINTMENT (OUTPATIENT)
Dept: GENERAL RADIOLOGY | Facility: HOSPITAL | Age: 49
End: 2024-01-18
Attending: ANESTHESIOLOGY
Payer: COMMERCIAL

## 2024-01-18 ENCOUNTER — HOSPITAL ENCOUNTER (OUTPATIENT)
Facility: HOSPITAL | Age: 49
Setting detail: HOSPITAL OUTPATIENT SURGERY
Discharge: HOME | End: 2024-01-18
Attending: ANESTHESIOLOGY | Admitting: ANESTHESIOLOGY
Payer: COMMERCIAL

## 2024-01-18 VITALS
TEMPERATURE: 97 F | WEIGHT: 147.5 LBS | DIASTOLIC BLOOD PRESSURE: 79 MMHG | BODY MASS INDEX: 24.57 KG/M2 | HEART RATE: 85 BPM | RESPIRATION RATE: 18 BRPM | SYSTOLIC BLOOD PRESSURE: 116 MMHG | HEIGHT: 65 IN | OXYGEN SATURATION: 100 %

## 2024-01-18 PROCEDURE — 62321 NJX INTERLAMINAR CRV/THRC: CPT | Performed by: ANESTHESIOLOGY

## 2024-01-18 PROCEDURE — 3E0S33Z INTRODUCTION OF ANTI-INFLAMMATORY INTO EPIDURAL SPACE, PERCUTANEOUS APPROACH: ICD-10-PCS | Performed by: ANESTHESIOLOGY

## 2024-01-18 RX ORDER — SODIUM CHLORIDE, SODIUM LACTATE, POTASSIUM CHLORIDE, CALCIUM CHLORIDE 600; 310; 30; 20 MG/100ML; MG/100ML; MG/100ML; MG/100ML
100 INJECTION, SOLUTION INTRAVENOUS CONTINUOUS
Status: DISCONTINUED | OUTPATIENT
Start: 2024-01-18 | End: 2024-01-18

## 2024-01-18 RX ORDER — DIPHENHYDRAMINE HYDROCHLORIDE 50 MG/ML
50 INJECTION INTRAMUSCULAR; INTRAVENOUS ONCE AS NEEDED
Status: DISCONTINUED | OUTPATIENT
Start: 2024-01-18 | End: 2024-01-18

## 2024-01-18 RX ORDER — MIDAZOLAM HYDROCHLORIDE 1 MG/ML
INJECTION INTRAMUSCULAR; INTRAVENOUS
Status: DISCONTINUED | OUTPATIENT
Start: 2024-01-18 | End: 2024-01-18

## 2024-01-18 RX ORDER — ONDANSETRON 2 MG/ML
4 INJECTION INTRAMUSCULAR; INTRAVENOUS ONCE AS NEEDED
Status: DISCONTINUED | OUTPATIENT
Start: 2024-01-18 | End: 2024-01-18

## 2024-01-18 RX ORDER — SODIUM CHLORIDE 9 MG/ML
INJECTION, SOLUTION INTRAMUSCULAR; INTRAVENOUS; SUBCUTANEOUS
Status: DISCONTINUED | OUTPATIENT
Start: 2024-01-18 | End: 2024-01-18

## 2024-01-18 RX ORDER — LIDOCAINE HYDROCHLORIDE 10 MG/ML
INJECTION, SOLUTION EPIDURAL; INFILTRATION; INTRACAUDAL; PERINEURAL
Status: DISCONTINUED | OUTPATIENT
Start: 2024-01-18 | End: 2024-01-18

## 2024-01-18 NOTE — H&P
History & Physical Examination    Patient Name: Vita Gillis  MRN: [id number]  [facility]: [id number]  YOB: 1975    Pre-Operative Diagnosis:  Cervical radiculitis [M54.12]    Present Illness: Cervical radiculitis    Medications Prior to Admission   Medication Sig Dispense Refill Last Dose    omega-3 fatty acids 1000 MG Oral Cap Take 1,000 mg by mouth daily.   Past Week    clobetasol 0.05 % External Solution APPLY TO THE AFFECTED AREA OF SCALP TWICE DAILY TO ITCHY AREAS       clobetasol 0.05 % External Ointment        hydrocortisone 2.5 % External Cream        ketoconazole 2 % External Cream        ketoconazole 2 % External Shampoo        Norethindrone (ORTHO MICRONOR) 0.35 MG Oral Tab Take 1 tablet (0.35 mg total) by mouth daily. 84 tablet 3     lamoTRIgine 150 MG Oral Tab Take 1 tablet (150 mg total) by mouth 2 (two) times daily. 180 tablet 0     CLONIDINE 0.1 MG Oral Tab TAKE 1 TABLET(0.1 MG) BY MOUTH TWICE DAILY 180 tablet 0     desvenlafaxine  MG Oral Tablet 24 Hr TAKE 1 TABLET(100 MG) BY MOUTH DAILY 30 tablet 2     SUMAtriptan Succinate 100 MG Oral Tab TAKE 1 TABLET BY MOUTH WITHIN 30 MINUTES OF HEADACHE ONSET 9 tablet 0     OMEPRAZOLE 20 MG Oral Capsule Delayed Release TAKE 1 CAPSULE(20 MG) BY MOUTH TWICE DAILY BEFORE MEALS 180 capsule 2      Current Facility-Administered Medications   Medication Dose Route Frequency    lactated ringers infusion  100 mL/hr Intravenous Continuous    ondansetron (Zofran) 4 MG/2ML injection 4 mg  4 mg Intravenous Once PRN       Allergies: No Known Allergies    Past Medical History:   Diagnosis Date    Abdominal distention     Abdominal pain     Allergic rhinitis     Anxiety state, unspecified     Back pain     Bad breath     Belching     Bloating     Blood in the stool     Constipation     Depression     Dysmenorrhea     Esophageal reflux     Frequent use of laxatives     Heartburn     Hemorrhoids     High cholesterol     History of depression     History of  stomach ulcers     Indigestion     Irregular bowel habits     Loss of appetite     Migraine     Migraines     Nausea     Obesity     Other and unspecified hyperlipidemia     Pain with bowel movements     Problems with swallowing     Sleep disturbance     Stress     Uncomfortable fullness after meals     Weight gain     Weight loss      Past Surgical History:   Procedure Laterality Date    COLONOSCOPY      COLPOSCOPY, CERVIX W/UPPER ADJACENT VAGINA; W/BIOPSY(S), CERVIX  2008    HPV+    FRACTURE SURGERY      repair of nasal fracture    GASTRO - DMG  8/16    healed ulcers    OTHER      wisdom teeth extraction    OTHER SURGICAL HISTORY      nose repair 2014    UP GI ENDOSCOPY,BALL DIL,30MM N/A 8/9/2016    Procedure: ESOPHAGOGASTRODUODENOSCOPY, POSSIBLE BIOPSY, POSSIBLE POLYPECTOMY 76990;  Surgeon: Sukh Samayoa MD;  Location: [facility]    UPPER GI ENDO NO BARRETTS  5/16    esophageal ulcer; HH    UPPER GI ENDOSCOPY,BIOPSY N/A 5/17/2016    Procedure: ESOPHAGOGASTRODUODENOSCOPY, POSSIBLE BIOPSY, POSSIBLE POLYPECTOMY 34594;  Surgeon: Sukh Samayoa MD;  Location: [facility]     Family History   Problem Relation Age of Onset    Alcohol and Other Disorders Associated Father     Homicide History Father         father strangled mother, patient age 8    Psychiatric Sister         postpartum depression    Depression Son     Other (Other) Son         Duchennes muscular dystrophy    Cancer Maternal Grandmother         lung, brain    Breast Cancer Maternal Grandmother 52    Diabetes Maternal Grandmother     Stroke Maternal Grandmother     Anxiety Maternal Grandmother         undiagnosed    Cancer Maternal Grandfather     Stroke Maternal Grandfather     Lipids Maternal Grandfather     Diabetes Maternal Grandfather     Heart Attack Maternal Grandfather     Diabetes Paternal Grandmother     Depression Paternal Grandmother     Diabetes Paternal Grandfather     Crohn's Disease Maternal Aunt     Breast Cancer Maternal  Aunt 62    Depression Other         pat uncle    Suicide History Other         pat uncle completed     Social History     Tobacco Use    Smoking status: Former     Packs/day: 1.00     Years: 18.00     Additional pack years: 0.00     Total pack years: 18.00     Types: Cigarettes     Quit date: 1/23/2008     Years since quitting: 15.9    Smokeless tobacco: Never    Tobacco comments:     non-smoker   Substance Use Topics    Alcohol use: Yes     Alcohol/week: 2.0 standard drinks of alcohol     Types: 2 Standard drinks or equivalent per week     Comment: socially       SYSTEM Check if Review is Normal Check if Physical Exam is Normal If not normal, please explain:   HEENT [x ] [x ]    NECK & BACK [x ] [x ]    HEART [x ] [x ]    LUNGS [x ] [x ]    ABDOMEN [x ] [x ]    UROGENITAL [x ] [x ]    EXTREMITIES [x ] [x ]    OTHER        [ x ] I have discussed the risks and benefits and alternatives with the patient/family.  They understand and agree to proceed with plan of care.  [ x ] I have reviewed the History and Physical done within the last 30 days.  Any changes noted above.    Ryan Mccarty MD

## 2024-01-18 NOTE — DISCHARGE INSTRUCTIONS
You have been given medication that makes you sleepy. This may have included both pain medication and sleeping medication. Most of the effects have worn off but you may still have some drowsiness for the next 6 to 8 hours.    Home Care  Follow these guidelines when you get home:    --Don't drink any alcohol for the next 24 hours.    --Don't drive, operate dangerous machinery, make important business or personal    decisions, or sign legal documents during the next 24 hours.    Follow Up Care  Follow up with your healthcare provider if you are not alert and back to your usual level of activity within 12 hours    When to seek medical advice  Call your healthcare provider right away if any of these occur:    --Drowsiness that gets worse  --Weakness or dizziness that gets worse  --Repeated vomiting  --You can not be awakened   Home Care Instructions Following Your Pain Procedure     Vita,  It has been a pleasure to have you as our patient. To help you at home, you must follow these general discharge instructions. We will review these with you before you are discharged. It is our hope that you have a complete and uneventful recovery from our procedure.     General Instructions:  What to Expect:  Bandages from your procedure today can be removed when you get home.  Please avoid soaking and/or swimming for 24 hours.  Showering is okay  It is normal to have increased pain symptoms and/or pain at injection site for up to 3-5 days after procedure, you can use heat or ice (20 minutes on 20 minutes off) for comfort.  You may experience some temporary side effects which may include restlessness or insomnia, flushing of the face, or heart palpitations.  Please contact the provider if these symptoms do not resolve within 3-4 days.  Lightheadedness or nausea may occur and should resolve within 24 to 48 hours.  If you develop a headache after treatment, rest, drink fluids (with caffeine, if possible) and take mild  over-the-counter pain medication.  If the headache does not improve with the above treatment, contact the physician.  Home Medications:  Resume all previously prescribed medication.  Please avoid taking NSAIDs (Non-Steriodal Anti-Inflammatory Drugs) such as:  Ibuprofen ( Advil, Motrin) Aleve (Naproxen), Diclofenac, Meloxicam for 6 hours after procedure.   If you are on Coumadin (Warfarin) or any other anti-coagulant (or \"blood thinning\") medication such as Plavix (Clopidogrel), Xarelto (Rivaroxaban), Eliquis (Apixaban), Effient (Prasugrel) etc., restart on the following day from the procedure unless otherwise directed by your provider.  If you are a diabetic, please increase the frequency of your glucose monitoring after the procedure as steroids may cause a temporary (2-3 day) increase in your blood sugar.  Contact your primary care physician if your blood sugar remains elevated as you may require some medication adjustment.  Diet:  Resume your regular diet as tolerated.  Activity:  We recommend that you relax and rest during the rest of your procedure day.  If you feel weakness in your arms or legs do not drive.  Follow-up Appointment  Please schedule a follow-up visit within 3 to 4 weeks after your last procedure date.  Question or Concerns:  Feel free to call our office with any questions or concerns at [phone] (option #2)    Vita  Thank you for coming to [facility] for your procedure.  The nurses try very hard to make sure you receive the best care possible.  Your trust in them as well as us is greatly appreciated.    Thanks so much,   Dr. Ryan Mccarty

## 2024-01-18 NOTE — OPERATIVE REPORT
[facility]  Operative Report  2024     Vita Gillis Patient Status:  Hospital Outpatient Surgery    1975 MRN [id number]   Location [facility] Attending Ryan Mccarty MD   Hosp Day # 0 PCP Cary Bacon MD     Indication: Vita is a 48 year old female with cervical radiculitis    Preoperative Diagnosis:  Cervical radiculitis [M54.12]    Postoperative Diagnosis: Same as above.    Procedure performed: CERVICAL EPIDURAL STEROID INJECTION with sedation    Anesthesia: Local and IV Sedation.    EBL: Less than 1 ml.    Procedure Description:  After reviewing the patient's history and performing a focused physical examination, the diagnosis was confirmed and contraindications such as infection and coagulopathy were ruled out.  Following review of allergies, potential side effects, and complications, including but not necessarily limited to infection, allergic reaction, local tissue breakdown, nerve injury, post-dural puncture headache and paresis, the patient indicated they understood and agreed to proceed.  After obtaining the informed consent, the patient was brought to the procedure room and monitored.  Per my order and under my supervision, the patient was sedated with intermittent intravenous doses of versed and fentanyl.  The vital signs were monitored and recorded by an experienced RN.  The procedure started after the patient was adequately sedated. Moderate intravenous conscious sedation was provided for 8 minutes.    The patient was brought to the procedure room and positioned prone.  After comprehensive monitors were applied, the patient's neck was prepped and draped sterilely.  After local anesthetic was instilled in the skin and subcutaneous tissue, a 20-gauge Tuohy needle was introduced and advanced under fluoroscopy at C7-T1.  The epidural space was reached by using a loss of resistance to air technique. There was no C.S.F. or blood through the  needle. After obtaining a good epidurogram by injecting Omnipaque 180 1 mL, a combination of normal saline and dexamethasone 10 mg in total volume of 4 mL was injected.  The needle was then flushed with normal saline 1 mL.  The stylet re-applied.  The needle was withdrawn with the tip intact.  The patient tolerated the procedure very well and recovered and was discharged to a responsible adult after discharge criteria were met.        Complications: None.    Follow up:  The patient was followed in the pain clinic as needed basis.          Ryan Mccarty MD

## 2024-01-19 ENCOUNTER — TELEPHONE (OUTPATIENT)
Dept: PAIN CLINIC | Facility: CLINIC | Age: 49
End: 2024-01-19

## 2024-01-19 NOTE — TELEPHONE ENCOUNTER
Courtesy called placed to patient for post procedure follow up. Patient stated \"I'm . IPt verbalized understanding to call with any questions or concerns.      Procedure: RAMSEY  Date: 1/18/2024  Follow up Visit Scheduled:  2/1/2024 with Shubham Nance

## 2024-01-22 ENCOUNTER — PATIENT MESSAGE (OUTPATIENT)
Dept: ORTHOPEDICS CLINIC | Facility: CLINIC | Age: 49
End: 2024-01-22

## 2024-01-22 NOTE — TELEPHONE ENCOUNTER
Do you want patient to come into clinic for follow-up appointment after MRI?  Please advise, thank you!    MRI completed 1/17/24    IMPRESSION:   1. Unremarkable MRI of the sacrum/coccyx. No significant surrounding soft tissue abnormality is identified.   2. Grade 1 anterolisthesis and bilateral spondylolysis are present at L5-S1 and there is grade 1 retrolisthesis at L4-5. Lower lumbar degenerative disc disease is most pronounced at L4-5.   3. Small uterine fibroids.

## 2024-01-22 NOTE — TELEPHONE ENCOUNTER
From: Vita Gillis  To: Jefferson RAZO  Sent: 1/22/2024 2:38 PM CST  Subject: Tailbone MRI    I see the results for the MRI have come back. I wanted to see what next steps are. I have been taking the meds that were prescribed. That has not helped at all. I added some additional stretches/exercises. I am still having same pain no changes. I spoke with company about back brace but need to wait until deductible has been met. (Which should be next month)  Thanks

## 2024-01-23 NOTE — TELEPHONE ENCOUNTER
Yes would like to discuss findings, seem to be coming from the lumbar spine not the sacrum and we can discuss options.

## 2024-01-31 ENCOUNTER — OFFICE VISIT (OUTPATIENT)
Dept: ORTHOPEDICS CLINIC | Facility: CLINIC | Age: 49
End: 2024-01-31
Payer: COMMERCIAL

## 2024-01-31 DIAGNOSIS — M51.36 DEGENERATIVE DISC DISEASE, LUMBAR: ICD-10-CM

## 2024-01-31 DIAGNOSIS — M43.17 SPONDYLOLISTHESIS OF LUMBOSACRAL REGION: Primary | ICD-10-CM

## 2024-01-31 DIAGNOSIS — M54.16 LUMBAR RADICULOPATHY: ICD-10-CM

## 2024-01-31 PROCEDURE — 99214 OFFICE O/P EST MOD 30 MIN: CPT | Performed by: FAMILY MEDICINE

## 2024-01-31 NOTE — PROGRESS NOTES
Sports Medicine Clinic Note     Subjective:    Chief Complaint   Patient presents with    Follow - Up     Saccrum mri tr  - no new or worsening symptoms  - pain is the same      Interval History: 48-year-old female patient returns for a follow-up visit after her last appointment concerning persistent tailbone pain, here to review MRI which was ordered at last visit. There has been no significant change in her symptoms since the last visit. No new symptoms or complications have arisen since the last assessment. She reports ongoing discomfort, particularly when sitting or transitioning from sitting to standing. The patient has an established relationship with her pain management team for cervical spine issues and has a scheduled appointment with them to discuss both her cervical concerns, is asking if she should also discuss the lumbar spine findings noted on the MRI.    Objective:    Inspection:   The patient continues to exhibit normal gait and posture. She appears uncomfortable when sitting and transitioning from sit to stand. No visible deformities, swelling, or changes noted in the lumbosacral spine area since the last visit.  Palpation:   Persistent tenderness along the sacrococcygeal region, with no significant change since the last examination.  Range of Motion: The patient maintains the ability to flex and extend the lower spine with some discomfort in flexion.  Neurovascular Examination: Lower extremity strength and sensation remain intact, with no changes from the previous assessment.    Imaging: Review of recent MRI of the sacrum/coccyx reveals unremarkable findings in the sacrum/coccyx area with no significant soft tissue abnormality. Notably, there is a grade 1 anterolisthesis and bilateral spondylolysis at L5-S1, and grade 1 retrolisthesis at L4-5 with severe degenerative disc disease at L4-5. Small uterine fibroids were also noted.    Assessment:    The MRI findings do not indicate significant  abnormalities in the sacrum/coccyx area that could explain the patient's coccygeal pain. However, the findings in the lumbar spine, particularly at L4-L5 and L5-S1, may be contributing factors to her pain, potentially through a referred pain mechanism. The patient's symptoms remain localized primarily to the coccyx.     Plan:    Physical Therapy: Prescribed physical therapy focusing on the lower spine and pelvic area to help alleviate pain and improve mobility.  Medications: Recommending the continuation of over-the-counter options like Tylenol for pain management, and NSAIDs as needed for breakthrough pain.  Pain Management Consultation: Given the chronic nature of the patient's symptoms, a discussion with her pain management team regarding the potential for epidural steroid injections is recommended which may provide not only therapeutic benefit but can help diagnostically as well.   Activity Modifications: The patient should continue with recommended activity modifications and exercises tailored to her condition.    Follow-Up: Schedule for a follow-up as needed appointment to reassess her condition and response to the treatment plan.    Jefferson Gonzalez DO, CAQSM   Primary Care Sports Medicine    Department of Orthopaedic Surgery  [address] [address]   [address] [place] [id number]    t: [phone]  f: [phone]      [facility]

## 2024-02-01 ENCOUNTER — OFFICE VISIT (OUTPATIENT)
Dept: PAIN CLINIC | Facility: CLINIC | Age: 49
End: 2024-02-01
Payer: COMMERCIAL

## 2024-02-01 VITALS
BODY MASS INDEX: 24 KG/M2 | DIASTOLIC BLOOD PRESSURE: 56 MMHG | WEIGHT: 147 LBS | SYSTOLIC BLOOD PRESSURE: 98 MMHG | OXYGEN SATURATION: 98 % | HEART RATE: 82 BPM

## 2024-02-01 DIAGNOSIS — M53.3 COCCYDYNIA: Primary | ICD-10-CM

## 2024-02-01 PROCEDURE — 99214 OFFICE O/P EST MOD 30 MIN: CPT | Performed by: PHYSICIAN ASSISTANT

## 2024-02-01 NOTE — PROGRESS NOTES
Last procedure: CERVICAL EPIDURAL STEROID INJECTION with sedation   Date: 01/18/24  Percentage of relief obtained: 0  Duration of relief: NA    Current Pain Score: 6

## 2024-02-01 NOTE — PROGRESS NOTES
HPI:   Vita Gillis presents with complaints of neck and R UE pain, coccygeal pain.    The pain is described as moderate aching that is intermittent.  The patient’s activity level has remained the same since last visit.  The pain is worst unrelated to time of day.    Changes in condition/history since last visit: Patient is here today for follow-up, having had RAMSEY on 1/18/2024.  Procedure was well-tolerated, and had no adverse effects.  Overall, reports no significant relief.    In addition, she has had chronic coccygeal pain for past 5 months.  She has been given round of PO sterlid, using donut pillow, and has been doing HEP.  All have been ineffective, and was evaluated by ortho.  Given NSAIDs, without relief, and was sent for consideration of injection.      Last procedure: RAMSEY #1    date: 1/18/2024    Percentage of relief experienced from the procedure: 0%    Duration of the relief: N/A    The following activities will increase the patient’s pain: sitting    The following activities decrease the patient’s pain:  Standing    Functional Assessment: Patient reports that they are able to complete all of their ADL's such as eating, bathing, using the toilet, dressing and getting up from a bed or a chair independently.    Current Medications:  Current Outpatient Medications   Medication Sig Dispense Refill    clobetasol 0.05 % External Solution APPLY TO THE AFFECTED AREA OF SCALP TWICE DAILY TO ITCHY AREAS      clobetasol 0.05 % External Ointment       hydrocortisone 2.5 % External Cream       ketoconazole 2 % External Cream       ketoconazole 2 % External Shampoo       Norethindrone (ORTHO MICRONOR) 0.35 MG Oral Tab Take 1 tablet (0.35 mg total) by mouth daily. 84 tablet 3    lamoTRIgine 150 MG Oral Tab Take 1 tablet (150 mg total) by mouth 2 (two) times daily. 180 tablet 0    CLONIDINE 0.1 MG Oral Tab TAKE 1 TABLET(0.1 MG) BY MOUTH TWICE DAILY 180 tablet 0    desvenlafaxine  MG Oral Tablet 24 Hr  TAKE 1 TABLET(100 MG) BY MOUTH DAILY 30 tablet 2    SUMAtriptan Succinate 100 MG Oral Tab TAKE 1 TABLET BY MOUTH WITHIN 30 MINUTES OF HEADACHE ONSET 9 tablet 0    OMEPRAZOLE 20 MG Oral Capsule Delayed Release TAKE 1 CAPSULE(20 MG) BY MOUTH TWICE DAILY BEFORE MEALS 180 capsule 2    omega-3 fatty acids 1000 MG Oral Cap Take 1,000 mg by mouth daily.        Patient requires assistance with: No assistance required    Reviewed Patient History Dated: 1/18/2024 no changes noted    Physical Exam:   LMP 01/06/2024 (Exact Date)   VAS Pain Score:  /10  General Appearance: Well developed, well nourished, normal build, independent body habitus, no apparent physical disabilities, well groomed    Neurological Exam: WNL-Orientation to time, place and person, normal mood & effect, normal concentration & attention span  Inspection: Nonantalgic, no acute distress   pain to palpation coccygeal tip  Radiology/Lab Test Reviewed: MRI C spine 11/9/23:     C2-C3:  No significant disc/facet abnormality, spinal stenosis, or foraminal narrowing.   C3-C4:  Mild disc desiccation mild disc height loss.  There is a mild right paracentral disc protrusion with slight superior and inferior extrusion measuring approximately 0.8 x 0.2 x 0.9 cm in craniocaudal, AP and transverse dimensions respectively.    There is mild effacement of the ventral thecal sac without significant central canal stenosis.  There is secondary mild right neural foraminal stenosis.   C4-C5:  Mild disc desiccation and minimal annular disc bulge.  No significant stenosis.   C5-C6:  Mild disc desiccation and minimal annular disc bulge.  No significant stenosis.   C6-C7:  Moderate disc desiccation with disc height loss and broad-based disc bulge/osteophyte complex, including bilateral uncovertebral joint disc/osteophyte complexes, greater on the right relative to the left.  There is secondary mild central canal stenosis and moderate to severe right neural foraminal stenosis.  No  significant left foraminal stenosis.   C7-T1:  No significant disc/facet abnormality, spinal stenosis, or foraminal narrowing.          Lab Results   Component Value Date    WBC 5.4 10/09/2023    WBC 8.4 11/28/2022    WBC 6.9 07/19/2022     Lab Results   Component Value Date    HEMOGLOBIN 12.0 10/06/2017     Lab Results   Component Value Date    .0 10/09/2023    .0 11/28/2022    .0 07/19/2022       Do you have any known blood/bleeding disorders?  No  Does patient currently take blood thinners?   None  Does patient currently take any antibiotics?   No  Patient educated and verbalized understanding.  Medical Decision Making:   Diagnosis:    Encounter Diagnosis   Name Primary?    Coccydynia Yes     Impression: With regards to cervical and right upper extremity complaints, no improvement with RAMSEY, and has been offered ADR with Dr. Tony.  Would asked that she return to neurosurgery to discuss more definitive surgical options.    With regards to her coccygeal pain, this has been intermittently present for the past 4.5 months and has failed time, meds, HEP.  She is awaiting formal physical therapy, and was sent by Ortho for consideration of injection.  Will proceed with coccygeal injection at her earliest convenience.  Of note, an MRI of the sacrum and coccyx did reveal no structural anomalies of the coccygeal tip, though did incidentally note bilateral L5 spondylolysis with spondylolisthesis, though again, she has no complaints of low back or radicular lower extremity pain.    Plan: Patient to schedule the following injection: Coccygeal injection Levels: N/A, Procedure and risks were discussed with pt. including headache, bleeding, infection and potential nerve damage.  With regards to cervical complaints, follow-up with neurosurgery.    No orders of the defined types were placed in this encounter.      Meds & Refills for this Visit:  Requested Prescriptions      No prescriptions requested or ordered  in this encounter       Imaging & Consults:  None    The patient indicates understanding of these issues and agrees to the plan.    MICKI Hickman

## 2024-02-01 NOTE — PATIENT INSTRUCTIONS
Refill policies:    Allow 2-3 business days for refills; controlled substances may take longer.  Contact your pharmacy at least 5 days prior to running out of medication and have them send an electronic request or submit request through the “request refill” option in your [employer] account.  Refills are not addressed on weekends; covering physicians do not authorize routine medications on weekends.  No narcotics or controlled substances are refilled after noon on Fridays or by on call physicians.  By law, narcotics must be electronically prescribed.  A 30 day supply with no refills is the maximum allowed.  If your prescription is due for a refill, you may be due for a follow up appointment.  To best provide you care, patients receiving routine medications need to be seen at least once a year.  Patients receiving narcotic/controlled substance medications need to be seen at least once every 3 months.  In the event that your preferred pharmacy does not have the requested medication in stock (e.g. Backordered), it is your responsibility to find another pharmacy that has the requested medication available.  We will gladly send a new prescription to that pharmacy at your request.    Scheduling Tests:    If your physician has ordered radiology tests such as MRI or CT scans, please contact Central Scheduling at [phone] right away to schedule the test.  Once scheduled, the [facility] Centralized Referral Team will work with your insurance carrier to obtain pre-certification or prior authorization.  Depending on your insurance carrier, approval may take 3-10 days.  It is highly recommended patients assure they have received an authorization before having a test performed.  If test is done without insurance authorization, patient may be responsible for the entire amount billed.      Precertification and Prior Authorizations:  If your physician has recommended that you have a procedure or additional testing performed the [facility]  Centralized Referral Team will contact your insurance carrier to obtain pre-certification or prior authorization.    You are strongly encouraged to contact your insurance carrier to verify that your procedure/test has been approved and is a COVERED benefit.  Although the [facility] Centralized Referral Team does its due diligence, the insurance carrier gives the disclaimer that \"Although the procedure is authorized, this does not guarantee payment.\"    Ultimately the patient is responsible for payment.   Thank you for your understanding in this matter.  Paperwork Completion:  If you require FMLA or disability paperwork for your recovery, please make sure to either drop it off or have it faxed to our office at [phone]. Be sure the form has your name and date of birth on it.  The form will be faxed to our Forms Department and they will complete it for you.  There is a 25$ fee for all forms that need to be filled out.  Please be aware there is a 10-14 day turnaround time.  You will need to sign a release of information (NGA) form if your paperwork does not come with one.  You may call the Forms Department with any questions at [phone].  Their fax number is [phone].

## 2024-02-05 ENCOUNTER — TELEPHONE (OUTPATIENT)
Facility: CLINIC | Age: 49
End: 2024-02-05

## 2024-02-05 ENCOUNTER — TELEPHONE (OUTPATIENT)
Dept: PAIN CLINIC | Facility: CLINIC | Age: 49
End: 2024-02-05

## 2024-02-05 ENCOUNTER — OFFICE VISIT (OUTPATIENT)
Facility: CLINIC | Age: 49
End: 2024-02-05
Payer: COMMERCIAL

## 2024-02-05 ENCOUNTER — TELEPHONE (OUTPATIENT)
Dept: FAMILY MEDICINE CLINIC | Facility: CLINIC | Age: 49
End: 2024-02-05

## 2024-02-05 VITALS
OXYGEN SATURATION: 97 % | HEIGHT: 65.5 IN | WEIGHT: 141 LBS | BODY MASS INDEX: 23.21 KG/M2 | SYSTOLIC BLOOD PRESSURE: 112 MMHG | HEART RATE: 94 BPM | DIASTOLIC BLOOD PRESSURE: 62 MMHG

## 2024-02-05 DIAGNOSIS — M53.3 COCCYDYNIA: Primary | ICD-10-CM

## 2024-02-05 DIAGNOSIS — M54.12 CERVICAL RADICULOPATHY: Primary | ICD-10-CM

## 2024-02-05 PROCEDURE — 99213 OFFICE O/P EST LOW 20 MIN: CPT | Performed by: NEUROLOGICAL SURGERY

## 2024-02-05 NOTE — TELEPHONE ENCOUNTER
Received fax from [facility]  Patient having surgery 4/2/24 for C6-C7 ADR  Needs labs and MRSA/MSSA    Can you call patient to schedule?    Form in [facility] pre op pending folder

## 2024-02-05 NOTE — PATIENT INSTRUCTIONS
Refill policies:    Allow 2-3 business days for refills; controlled substances may take longer.  Contact your pharmacy at least 5 days prior to running out of medication and have them send an electronic request or submit request through the “request refill” option in your [employer] account.  Refills are not addressed on weekends; covering physicians do not authorize routine medications on weekends.  No narcotics or controlled substances are refilled after noon on Fridays or by on call physicians.  By law, narcotics must be electronically prescribed.  A 30 day supply with no refills is the maximum allowed.  If your prescription is due for a refill, you may be due for a follow up appointment.  To best provide you care, patients receiving routine medications need to be seen at least once a year.  Patients receiving narcotic/controlled substance medications need to be seen at least once every 3 months.  In the event that your preferred pharmacy does not have the requested medication in stock (e.g. Backordered), it is your responsibility to find another pharmacy that has the requested medication available.  We will gladly send a new prescription to that pharmacy at your request.    Scheduling Tests:    If your physician has ordered radiology tests such as MRI or CT scans, please contact Central Scheduling at [phone] right away to schedule the test.  Once scheduled, the [facility] Centralized Referral Team will work with your insurance carrier to obtain pre-certification or prior authorization.  Depending on your insurance carrier, approval may take 3-10 days.  It is highly recommended patients assure they have received an authorization before having a test performed.  If test is done without insurance authorization, patient may be responsible for the entire amount billed.      Precertification and Prior Authorizations:  If your physician has recommended that you have a procedure or additional testing performed the [facility]  Centralized Referral Team will contact your insurance carrier to obtain pre-certification or prior authorization.    You are strongly encouraged to contact your insurance carrier to verify that your procedure/test has been approved and is a COVERED benefit.  Although the [facility] Centralized Referral Team does its due diligence, the insurance carrier gives the disclaimer that \"Although the procedure is authorized, this does not guarantee payment.\"    Ultimately the patient is responsible for payment.   Thank you for your understanding in this matter.  Paperwork Completion:  If you require FMLA or disability paperwork for your recovery, please make sure to either drop it off or have it faxed to our office at [phone]. Be sure the form has your name and date of birth on it.  The form will be faxed to our Forms Department and they will complete it for you.  There is a 25$ fee for all forms that need to be filled out.  Please be aware there is a 10-14 day turnaround time.  You will need to sign a release of information (NGA) form if your paperwork does not come with one.  You may call the Forms Department with any questions at [phone].  Their fax number is [phone].    You are scheduled for C6-C7 ADR on 4/2/2024 with Dr. Beckford at [facility].    Pre-op instructions discussed with patient and surgical packet provided:    You will need to contact the Pre-admission department at [phone]. You will speak with a nurse who will review your health history and give you information from the hospital. The nurse will also get you scheduled for all your pre-op testing required for your surgery. If they do not answer when you call please leave a message and they will call you back, they return calls in surgical order, it may be a few days before they return your call.    No blood thinning medications, over the counter non-steroidal antiinflammatories, herbal supplements (garlic,tumeric etc.), vitamin E, fish oil or  krill oil for at least 7-14 days prior to surgery.     You may only take Tylenol, Extra Strength Tylenol, Arthritis Tylenol, or prescription Norco or Tramadol for pain if something is needed.    You will need to prep your skin using Hibiclens. Detailed instructions are provided below. Your showers are to start 5 days prior to your scheduled surgery. The last shower should be the night before surgery.     You should have nothing to eat or drink after 11:00pm the night prior to surgery except for the following:    Do drink 12 ounces of regular Gatorade (NOT RED) 12 hours and 4 hours prior to your scheduled surgery time, Do not drink any other liquids (including water) before your surgery. Do not chew gum or eat candies before surgery.  Take 1000 mg of Tylenol (Acetaminophen) 4 hours before your scheduled surgery time, take this with your scheduled Gatorade.    Our office will get authorization for surgery. We will attempt to contact you 3 days before surgery if we run into any complications or have not received your surgery authorization. We will continue to attempt to get authorization until 2pm the day before surgery. If authorization is not received we will give you a call to discuss next steps. Our goal is to make sure you do not proceed with an un-authorized surgery so that you do not end up having to pay for this surgery out of pocket.    Surgery is usually scheduled as 23 hour admission.  This is an estimate and varies from person to person.  Ultimately, the surgeon will determine when you are ready to be discharged.    PCP clearance is needed.  Our office will send a letter to your PCP, Dr. Cary Bacon. Please contact their office for appointment. Please make sure this appointment is completed at least 1 week prior to surgical date    The hospital will contact you 1-2 days before surgery with your arrival time.    Post operative you can move your neck as usual, refrain from excessive range of motion, you  cannot drive for the first week post operative. No heavy lifting for one week.   If you were on blood thinners (such as Coumadin, Pradaxa, Xarelto, etc) prior to surgery that we had you stop for surgery, please make sure you get instructions about when to resume the medication before you are discharged from the hospital    Per Dr. Beckford's surgery protocol your appointments are as follows:     2 week pre-op surgical telehealth (phone) appointment is on 3/21/2024 at 11:15 am with Cristy Riley RN     1 week pre-op surgical review scheduled on 3/25/2024 at 1:00 pm with Dr. Beckford.     1 week post-op appointment scheduled on 4/8/2024 at 2:00 pm with SHELL Ruby     4 week post-op appointment scheduled on 4/29/2024 at 1:00 pm with Dr. Beckford     3 month post surgery appointment to be scheduled at time of 4 week office visit.       Please make sure to arrive at least 15 minutes prior to your scheduled appointment in order to get checked in and roomed in a timely manner.  Depending on provider availability, late patients may be required to reschedule.  REFILLS:  After surgery, please remember that we do have a 48 hour refill policy that does not include weekends, please make sure to request your medications in a timely manner so that you do not go days without medication.    *Refills should be requested through your pharmacy or through the refill request in [employer] (log in, go to medications, then select refill request).    [employer] MESSAGING:  Please remember that our office is closed during the weekend and no one is available for [employer] messages. If you have an urgent or emergent matter please go to a walk-in center or the emergency room. Also please remember your [employer] messages are part of your legal medical record and should not be utilized as a personal email with our providers as it is visible to all [facility] employees. Also, Since [employer] messages are not for emergent matters it may be several days  before there is a response to your message.    FMLA/PAPERWORK COMPLETED BY OUR MEDICAL FORMS DEPARTMENT:  If you require FMLA paperwork for your surgery, please make sure to either Drop it off or have it faxed to our office at [phone]. Make sure it has your NAME, , and has your signature. You will need to have a Release of Information on file. To facilitate this process we ask that you requested it at the  on your way out and sign it. Without a signed NGA or signature on the form we will not be able to fax it and this will cause a delay with your forms. Fees charged for forms are $25 for initial submission and $15 for recertifications. If you have questions on the status of your forms please call the forms department at [phone].  **We do have a 3 week policy for all forms and paperwork, please make sure to allow plenty of time for completion. Same day paperwork will not be completed. **    Spine Navigator  You will have a telehealth visit with our spine navigator approximately 2 weeks before your surgery. This class is NOT optional like the spine class listed below. This appointment will get booked when you schedule your spinal surgery.      Please visit the following website for the detailed and up-to-date visitor screening and restriction policy at [email or web address].     When speaking with Pre-admissions you will be told about a spine class as it relates to your surgery. Although the class is not mandatory, you are strongly encouraged to attend. Make sure to bring your surgical binder to the class    The Pre-op Spine Surgery Class is held at [facility] most  from 4:00-5:00 pm.  Call Pre-admission Testing (PAT) to register at:  [phone].    Please park in the [facility] garage, check in at registration and meet in the [facility] lobby for your escort to class on the Ortho Spine unit.    The spine class is also available online at  [email or web address].     Hibiclens Bathing  Hibiclens is a body soap that is used before surgery protect you from getting an infection after surgery   Hibiclens comes in a large blue bottle and can be found in most pharmacies in the First Aid supplies  Shower with this daily for FIVE consecutive days before surgery, using the entire bottle over the five days.  The last shower should be the night before surgery.   Steps to bathing with Hibiclens  Do not use Hibiclens on your hair, face or private areas  Wash your hair and face as normal with your usual cleansers  Rinse well  Using a clean wet washcloth apply enough Hibiclens to cover your body. Wash from the neck down avoiding the genital areas and concentrating on the surgical area  Rinse well  Dry yourself with a clean, dry towel  Do not use any powders, creams, lotions or sprays on your body as these attract bacteria  Deodorant and facial creams are acceptable.   (Laundering/cleaning: Chlorhexidine gluconate skin cleansers will cause stains if used with chlorine releasing products. Rinse completely and use only non-chlorine detergents.)

## 2024-02-05 NOTE — TELEPHONE ENCOUNTER
Prior authorization request completed for: coccygeal injection (bursa/joint injection)  Authorization #  not required   Pre-D: not recommended   Exclusions/Restrictions: no  Covered Benefit: yes   Authorization dates: n/a  CPT codes approved: 61307  Number of visits/dates of service approved: 1  Physician: preston  Location: [facility]    Call Ref#: [id number]  Representative Name: Luis E   Insurance Carrier: [phone] passcode [id number]    Patient can be scheduled. Routed to Navigator.

## 2024-02-05 NOTE — TELEPHONE ENCOUNTER
Patient advised of insurance approval to proceed with injections and is agreeable to scheduling. Patient scheduled for procedure, pre-procedure instructions reviewed. Patient prefers conscious  sedation. Reviewed sedation instructions including Fasting 8 hours prior, Required. Patient encouraged but not required to hold NSAIDS for 24 hours and Omega-3 for 5 days prior to procedure. Patient verbalized understanding of instructions, no further needs at this time.        [facility]  PRE-PROCEDURE INSTRUCTIONS WITH IV SEDATION     Procedure: Coccygeal Injection    Appointment Date: 02/20/2024     Check-In Time: 08:00 AM     Follow-Up Date/Time with MICKI Charles : 03/08/2024 @ 03:30 PM    Prior to the procedure:  Please update us prior to the procedure if you are experiencing any symptoms of infection such as cough, fever, chills, urinary symptoms, or have recently been prescribed antibiotics, have open wounds, have recently had surgery or dental procedures.    Day of Procedure:  Do not eat or drink anything (including water) 8 hours prior to your procedure.  If you take morning blood pressure medication or oral diabetic medication, please take with a small sip of water.  You are required to have a responsible adult drive you home after your procedure. You may not take a cab or ride share unless you have a responsible adult with you in the cab or ride share.  A family member or friend is required to stay in our waiting room or hospital garage because of the sedation you will receive, you may be sleepy and forgetful. You may not remember anything told to you after your procedure including discharge instructions. Please note: children are not allowed in the holding area so please make appropriate arrangements.  Any additional family members and friends will need to remain in the surgical waiting room or hospital garage for the duration of your procedure. *If your family member or friend elects to  wait in the garage, they must leave a cell phone number with the lab staff in case they need to be reached.  Please park in the [facility] garage and follow the signs to the [facility].  Please bring your Insurance Card, Photo ID, List of Current Medications and Referral (if applicable) to your appointment. Check in at [facility] ([address] [place] [address]) outpatient registration in the [facility].  Please note-No prescriptions will be written by Pain Clinic in OR on the day of procedure. If you require a refill of medications, please contact the office 48 hours prior to your procedure.  If you have an implanted Spinal Cord or Peripheral Nerve Stimulator: Please remember to turn device off for procedure    *If you are fasting, you may take blood pressure and thyroid medications with a small sip of water the day of your procedure.   *If you are diabetic, your glucose must be within a normal range for you. If you are fasting, you should check your glucose levels and adjust with medication if needed.    Medication Hold:  Number of days you need to be off for the following medications:    Aggrenox 10 days   Agrylin (Anagrelide) 10 days  Brilinta (Ticagrelor) 7 days  Imbruvica (Ibrutinib) 3 days   Enbrel (Etanercept) 24 hours   Fragmin (Dalteparin) 24 hours   Pletal (Cilostazol) 7 days  Effient (Prasugrel) 7 days  Pradaxa 10 days  Trental 7 days  Eliquis (Apixaban) 3 days  Xarelto (Rivaroxaban) 3 days  Lovenox (Enoxaparin) 24 hours  Aspirin  Greater than 81mg but less than 325mg   5 days  325mg and greater                  7 days  (*81 mg      24 hours preferred, but not required)  Coumadin       5 days  Procedure may be cancelled if INR is elevated.   Excedrin (with aspirin) 7 days  Plavix (Clopidogrel)                             7 days    NSAIDs: 24 hours preferred, but not required      Ibuprofen (Motrin, Advil, Vicoprofen), Naproxen (Naprosyn, Aleve), Piroxcam (Feldene), Meloxicam (Mobic),  Oxaprozin (Daypro), Diclofenac (Voltaren), Indomethacin (Indocin), Etodolac (Lodine), Nabumetone (Relafen), Celebrex (Celecoxib)           HERBAL SUPPLEMENTS  5 days preferred, but not required  Fish oil, krill oil, Omega-3, Vascepa, Vitamin E, Turmeric, Garlic                       Insurance Authorization:   Most insurances are now requiring a preauthorization for all procedures.     Please contact your insurance carrier to determine what your financial responsibility will be for the procedure(s).    Cancellation/Rescheduling Appointment:   In the event you need to cancel or reschedule your appointment, you must notify the office 24 hours prior.    Post-procedure instructions:        Please schedule a follow up visit within 2 to 4 weeks after your last procedure date   Please call our office with any questions or concerns before or after your procedure at [phone], #[phone].  If you are a diabetic, please increase the frequency of your glucose monitoring after the procedure as this may cause a temporary increase in your blood sugar. Contact your primary care physician if your blood sugar rises as you may require some medication adjustment.        It is normal to have increased pain at injection site for up to 3-5 days after procedure, you can        use heat or ice (20 minutes on 20 minutes off) for comfort.

## 2024-02-05 NOTE — TELEPHONE ENCOUNTER
You are scheduled for C6-C7 ADR on 4/2/2024 with Dr. Beckford at [facility].    Pre-op instructions discussed with patient and surgical packet provided:    You will need to contact the Pre-admission department at [phone]. You will speak with a nurse who will review your health history and give you information from the hospital. The nurse will also get you scheduled for all your pre-op testing required for your surgery. If they do not answer when you call please leave a message and they will call you back, they return calls in surgical order, it may be a few days before they return your call.    No blood thinning medications, over the counter non-steroidal antiinflammatories, herbal supplements (garlic,tumeric etc.), vitamin E, fish oil or krill oil for at least 7-14 days prior to surgery.     You may only take Tylenol, Extra Strength Tylenol, Arthritis Tylenol, or prescription Norco or Tramadol for pain if something is needed.    You will need to prep your skin using Hibiclens. Detailed instructions are provided below. Your showers are to start 5 days prior to your scheduled surgery. The last shower should be the night before surgery.     You should have nothing to eat or drink after 11:00pm the night prior to surgery except for the following:    Do drink 12 ounces of regular Gatorade (NOT RED) 12 hours and 4 hours prior to your scheduled surgery time, Do not drink any other liquids (including water) before your surgery. Do not chew gum or eat candies before surgery.  Take 1000 mg of Tylenol (Acetaminophen) 4 hours before your scheduled surgery time, take this with your scheduled Gatorade.    Our office will get authorization for surgery. We will attempt to contact you 3 days before surgery if we run into any complications or have not received your surgery authorization. We will continue to attempt to get authorization until 2pm the day before surgery. If authorization is not received we will give you a call  to discuss next steps. Our goal is to make sure you do not proceed with an un-authorized surgery so that you do not end up having to pay for this surgery out of pocket.    Surgery is usually scheduled as 23 hour admission.  This is an estimate and varies from person to person.  Ultimately, the surgeon will determine when you are ready to be discharged.    PCP clearance is needed.  Our office will send a letter to your PCP, Dr. Cary Bacon. Please contact their office for appointment. Please make sure this appointment is completed at least 1 week prior to surgical date    The hospital will contact you 1-2 days before surgery with your arrival time.    Post operative you can move your neck as usual, refrain from excessive range of motion, you cannot drive for the first week post operative. No heavy lifting for one week.   If you were on blood thinners (such as Coumadin, Pradaxa, Xarelto, etc) prior to surgery that we had you stop for surgery, please make sure you get instructions about when to resume the medication before you are discharged from the hospital    Per Dr. Beckford's surgery protocol your appointments are as follows:     2 week pre-op surgical telehealth (phone) appointment is on 3/21/2024 at 11:15 am with Cristy Riley RN     1 week pre-op surgical review scheduled on 3/25/2024 at 1:00 pm with Dr. Beckford.     1 week post-op appointment scheduled on 4/8/2024 at 2:00 pm with SHELL Ruby     4 week post-op appointment scheduled on 4/29/2024 at 1:00 pm with Dr. Beckford     3 month post surgery appointment to be scheduled at time of 4 week office visit.       Please make sure to arrive at least 15 minutes prior to your scheduled appointment in order to get checked in and roomed in a timely manner.  Depending on provider availability, late patients may be required to reschedule.  REFILLS:  After surgery, please remember that we do have a 48 hour refill policy that does not include weekends, please make sure  to request your medications in a timely manner so that you do not go days without medication.    *Refills should be requested through your pharmacy or through the refill request in [employer] (log in, go to medications, then select refill request).    [employer] MESSAGING:  Please remember that our office is closed during the weekend and no one is available for [employer] messages. If you have an urgent or emergent matter please go to a walk-in center or the emergency room. Also please remember your [employer] messages are part of your legal medical record and should not be utilized as a personal email with our providers as it is visible to all [facility] employees. Also, Since [employer] messages are not for emergent matters it may be several days before there is a response to your message.    FMLA/PAPERWORK COMPLETED BY OUR MEDICAL FORMS DEPARTMENT:  If you require FMLA paperwork for your surgery, please make sure to either Drop it off or have it faxed to our office at [phone]. Make sure it has your NAME, , and has your signature. You will need to have a Release of Information on file. To facilitate this process we ask that you requested it at the  on your way out and sign it. Without a signed NGA or signature on the form we will not be able to fax it and this will cause a delay with your forms. Fees charged for forms are $25 for initial submission and $15 for recertifications. If you have questions on the status of your forms please call the forms department at [phone].  **We do have a 3 week policy for all forms and paperwork, please make sure to allow plenty of time for completion. Same day paperwork will not be completed. **    Spine Navigator  You will have a telehealth visit with our spine navigator approximately 2 weeks before your surgery. This class is NOT optional like the spine class listed below. This appointment will get booked when you schedule your spinal surgery.      Please visit the  following website for the detailed and up-to-date visitor screening and restriction policy at [facility] [email or web address].     When speaking with Pre-admissions you will be told about a spine class as it relates to your surgery. Although the class is not mandatory, you are strongly encouraged to attend. Make sure to bring your surgical binder to the class    The Pre-op Spine Surgery Class is held at [facility] most Wednesdays from 4:00-5:00 pm.  Call Pre-admission Testing (PAT) to register at:  [phone].    Please park in the [facility] Parking garage, check in at registration and meet in the [facility] lobby for your escort to class on the Ortho Spine unit.    The spine class is also available online at [email or web address].     Hibiclens Bathing  Hibiclens is a body soap that is used before surgery protect you from getting an infection after surgery   Hibiclens comes in a large blue bottle and can be found in most pharmacies in the First Aid supplies  Shower with this daily for FIVE consecutive days before surgery, using the entire bottle over the five days.  The last shower should be the night before surgery.   Steps to bathing with Hibiclens  Do not use Hibiclens on your hair, face or private areas  Wash your hair and face as normal with your usual cleansers  Rinse well  Using a clean wet washcloth apply enough Hibiclens to cover your body. Wash from the neck down avoiding the genital areas and concentrating on the surgical area  Rinse well  Dry yourself with a clean, dry towel  Do not use any powders, creams, lotions or sprays on your body as these attract bacteria  Deodorant and facial creams are acceptable.   (Laundering/cleaning: Chlorhexidine gluconate skin cleansers will cause stains if used with chlorine releasing products. Rinse completely and use only non-chlorine detergents.)  For office use only:   Surgical clearances needed:   Prior-Auth needed:   CPT  Codes:

## 2024-02-05 NOTE — TELEPHONE ENCOUNTER
Future Appointments   Date Time Provider Department Center   2/19/2024  5:40 PM Joby Mcclure APRN LOMGPLFD LOM[facility] [place]   3/6/2024  1:00 PM Vita Deluna APRN EMGOSW [facility] [place]   3/8/2024  3:30 PM Shubham Nance PA ENIPain [facility] [place]   3/21/2024 11:15 AM LD RN SPINE NAVIGATOR ENINAPER2 [facility] [place]   3/22/2024 10:00 AM EH NUC RM4 EH [facility]   3/25/2024  1:00 PM SOLE Beckford DO ENINAPER2 [facility] [place]   4/8/2024  2:00 PM Luana Oseguera APRN ENINAPER2 [facility] [place]   4/29/2024  1:00 PM SOLE Beckford DO ENINAPER2 [facility] [place]     Pt scheduled Pre-op on 3/6/24

## 2024-02-05 NOTE — PROGRESS NOTES
Patient is here today for follow up on Cervical Pain w/ radiation to right UE down to hand and fingers            Treatments:  RAMSEY injection 2024-- no relief      Imagin23--MRI Spine Cervical    Pain Scale:  6/10

## 2024-02-06 ENCOUNTER — TELEPHONE (OUTPATIENT)
Dept: SURGERY | Facility: CLINIC | Age: 49
End: 2024-02-06

## 2024-02-06 DIAGNOSIS — M54.12 CERVICAL RADICULOPATHY: Primary | ICD-10-CM

## 2024-02-06 NOTE — PROGRESS NOTES
[facility]  Neurological Surgery Clinic Note    Vita Gillis  12/30/1975  [id number]  PCP: Cary Bacon MD    REASON FOR VISIT:  RUE radiculoapthy    HISTORY OF PRESENT ILLNESS:  Vita Gillis is a(n) 48 year old female with RUE radiculopathy.  She has completed PT with no improvement in her symptoms.  She recently underwent an KELLY with no significant improvement.  She has no weakness.  She has pain down her R arm.  She cares for her son.        Location: neck pain  Quality: RUE radiculopathy  Severity: progressive  Duration: months  Timing: any  Context: C6-7 DDD and DJD  Modifying Factors: PT and KELLY offered no help   Associated signs/symptoms: pain      PAST MEDICAL HISTORY:  Past Medical History:   Diagnosis Date    Abdominal distention     Abdominal pain     Allergic rhinitis     Anxiety state, unspecified     Back pain     Bad breath     Belching     Bloating     Blood in the stool     Constipation     Depression     Dysmenorrhea     Esophageal reflux     Frequent use of laxatives     Heartburn     Hemorrhoids     High cholesterol     History of depression     History of stomach ulcers     Indigestion     Irregular bowel habits     Loss of appetite     Migraine     Migraines     Nausea     Obesity     Other and unspecified hyperlipidemia     Pain with bowel movements     Problems with swallowing     Sleep disturbance     Stress     Uncomfortable fullness after meals     Weight gain     Weight loss        PAST SURGICAL HISTORY:  Past Surgical History:   Procedure Laterality Date    COLONOSCOPY      COLPOSCOPY, CERVIX W/UPPER ADJACENT VAGINA; W/BIOPSY(S), CERVIX  2008    HPV+    FRACTURE SURGERY      repair of nasal fracture    GASTRO - [facility]  8/16    healed ulcers    OTHER      wisdom teeth extraction    OTHER SURGICAL HISTORY      nose repair 2014    UP GI ENDOSCOPY,BALL DIL,30MM N/A 8/9/2016    Procedure: ESOPHAGOGASTRODUODENOSCOPY, POSSIBLE BIOPSY, POSSIBLE POLYPECTOMY  60634;  Surgeon: Sukh Samayoa MD;  Location: [facility]    UPPER GI ENDO NO BARRETTS  5/16    esophageal ulcer; HH    UPPER GI ENDOSCOPY,BIOPSY N/A 5/17/2016    Procedure: ESOPHAGOGASTRODUODENOSCOPY, POSSIBLE BIOPSY, POSSIBLE POLYPECTOMY 63215;  Surgeon: Sukh Samayoa MD;  Location: [facility]       FAMILY HISTORY:  family history includes Alcohol and Other Disorders Associated in her father; Anxiety in her maternal grandmother; Breast Cancer (age of onset: 52) in her maternal grandmother; Breast Cancer (age of onset: 62) in her maternal aunt; Cancer in her maternal grandfather and maternal grandmother; Crohn's Disease in her maternal aunt; Depression in her paternal grandmother, son, and another family member; Diabetes in her maternal grandfather, maternal grandmother, paternal grandfather, and paternal grandmother; Heart Attack in her maternal grandfather; Homicide History in her father; Lipids in her maternal grandfather; Other in her son; Psychiatric in her sister; Stroke in her maternal grandfather and maternal grandmother; Suicide History in an other family member.    SOCIAL HISTORY:   reports that she quit smoking about 16 years ago. Her smoking use included cigarettes. She has a 18 pack-year smoking history. She has never used smokeless tobacco. She reports current alcohol use of about 2.0 standard drinks of alcohol per week. She reports that she does not currently use drugs.    ALLERGIES:  No Known Allergies    MEDICATIONS:  Current Outpatient Medications on File Prior to Visit   Medication Sig Dispense Refill    clobetasol 0.05 % External Solution APPLY TO THE AFFECTED AREA OF SCALP TWICE DAILY TO ITCHY AREAS      hydrocortisone 2.5 % External Cream       ketoconazole 2 % External Cream       ketoconazole 2 % External Shampoo       Norethindrone (ORTHO MICRONOR) 0.35 MG Oral Tab Take 1 tablet (0.35 mg total) by mouth daily. 84 tablet 3    lamoTRIgine 150 MG Oral Tab Take 1 tablet (150  mg total) by mouth 2 (two) times daily. 180 tablet 0    CLONIDINE 0.1 MG Oral Tab TAKE 1 TABLET(0.1 MG) BY MOUTH TWICE DAILY 180 tablet 0    desvenlafaxine  MG Oral Tablet 24 Hr TAKE 1 TABLET(100 MG) BY MOUTH DAILY 30 tablet 2    SUMAtriptan Succinate 100 MG Oral Tab TAKE 1 TABLET BY MOUTH WITHIN 30 MINUTES OF HEADACHE ONSET 9 tablet 0    OMEPRAZOLE 20 MG Oral Capsule Delayed Release TAKE 1 CAPSULE(20 MG) BY MOUTH TWICE DAILY BEFORE MEALS 180 capsule 2    omega-3 fatty acids 1000 MG Oral Cap Take 1,000 mg by mouth daily.       No current facility-administered medications on file prior to visit.       REVIEW OF SYSTEMS:  Review of Systems   All other systems reviewed and are negative.       PHYSICAL EXAMINATION:  Neurologic Exam     Mental Status   Oriented to person, place, and time.     Cranial Nerves   Cranial nerves II through XII intact.     Motor Exam   Muscle bulk: normal  Overall muscle tone: normal  Right arm tone: normal  Left arm tone: normal  Right arm pronator drift: absent  Left arm pronator drift: absent  Right leg tone: normal  Left leg tone: normal    Strength   Strength 5/5 throughout.     Sensory Exam   Light touch normal.   Vibration normal.   Proprioception normal.   Pinprick normal.     Gait, Coordination, and Reflexes     Gait  Gait: normal    Reflexes   Right brachioradialis: 2+  Left brachioradialis: 2+  Right biceps: 2+  Left biceps: 2+  Right triceps: 2+  Left triceps: 2+  Right patellar: 2+  Left patellar: 2+  Right achilles: 2+  Left achilles: 2+  Right : 2+  Left : 2+       IMAGING:  C6-7 DDD and DJD    ASSESSMENT:  RUE radiculopthy    Plan:  Recommend C6-7 ADR    Antonio Beckford DO  Neurological Surgery  [facility]

## 2024-02-06 NOTE — TELEPHONE ENCOUNTER
You are scheduled for C6-C7 ADR on 3.28.24 with Dr. Beckford at [facility].     You will need to contact the Pre-admission department at [phone]. You will speak with a nurse who will review your health history and give you information from the hospital. The nurse will also get you scheduled for all your pre-op testing required for your surgery. If they do not answer when you call please leave a message and they will call you back, they return calls in surgical order, it may be a few days before they return your call.     No blood thinning medications, over the counter non-steroidal antiinflammatories, herbal supplements (garlic,tumeric etc.), vitamin E, fish oil or krill oil for at least 7-14 days prior to surgery.      You may only take Tylenol, Extra Strength Tylenol, Arthritis Tylenol, or prescription Norco or Tramadol for pain if something is needed.     You will need to prep your skin using Hibiclens. Detailed instructions are provided below. Your showers are to start 5 days prior to your scheduled surgery. The last shower should be the night before surgery.      You should have nothing to eat or drink after 11:00pm the night prior to surgery except for the following:     Do drink 12 ounces of regular Gatorade (NOT RED) 12 hours and 4 hours prior to your scheduled surgery time, Do not drink any other liquids (including water) before your surgery. Do not chew gum or eat candies before surgery.  Take 1000 mg of Tylenol (Acetaminophen) 4 hours before your scheduled surgery time, take this with your scheduled Gatorade.     Our office will get authorization for surgery. We will attempt to contact you 3 days before surgery if we run into any complications or have not received your surgery authorization. We will continue to attempt to get authorization until 2pm the day before surgery. If authorization is not received we will give you a call to discuss next steps. Our goal is to make sure you do not proceed with  an un-authorized surgery so that you do not end up having to pay for this surgery out of pocket.     Surgery is usually scheduled as 23 hour admission.  This is an estimate and varies from person to person.  Ultimately, the surgeon will determine when you are ready to be discharged.     PCP clearance is needed.  Our office will send a letter to your PCP, Dr. Cary Bacon. Please contact their office for appointment. Please make sure this appointment is completed at least 1 week prior to surgical date     The hospital will contact you 1-2 days before surgery with your arrival time.     Post operative you can move your neck as usual, refrain from excessive range of motion, you cannot drive for the first week post operative. No heavy lifting for one week.   If you were on blood thinners (such as Coumadin, Pradaxa, Xarelto, etc) prior to surgery that we had you stop for surgery, please make sure you get instructions about when to resume the medication before you are discharged from the hospital     Per Dr. Beckford's surgery protocol your appointments are as follows:      2 week pre-op surgical telehealth (phone) appointment is on 3/21/2024 at 11:15 am with Cristy Riley RN     1 week pre-op surgical review scheduled on 3/25/2024 at 1:00 pm with Dr. Beckford.     1 week post-op appointment scheduled on 4/8/2024 at 2:00 pm with SHELL Ruby     4 week post-op appointment scheduled on 4/29/2024 at 1:00 pm with Dr. Beckford     3 month post surgery appointment to be scheduled at time of 4 week office visit.        Please make sure to arrive at least 15 minutes prior to your scheduled appointment in order to get checked in and roomed in a timely manner.  Depending on provider availability, late patients may be required to reschedule.  REFILLS:  After surgery, please remember that we do have a 48 hour refill policy that does not include weekends, please make sure to request your medications in a timely manner so that you do not  go days without medication.     *Refills should be requested through your pharmacy or through the refill request in [employer] (log in, go to medications, then select refill request).     [employer] MESSAGING:  Please remember that our office is closed during the weekend and no one is available for [employer] messages. If you have an urgent or emergent matter please go to a walk-in center or the emergency room. Also please remember your [employer] messages are part of your legal medical record and should not be utilized as a personal email with our providers as it is visible to all [facility] employees. Also, Since [employer] messages are not for emergent matters it may be several days before there is a response to your message.     FMLA/PAPERWORK COMPLETED BY OUR MEDICAL FORMS DEPARTMENT:  If you require FMLA paperwork for your surgery, please make sure to either Drop it off or have it faxed to our office at [phone]. Make sure it has your NAME, , and has your signature. You will need to have a Release of Information on file. To facilitate this process we ask that you requested it at the  on your way out and sign it. Without a signed NGA or signature on the form we will not be able to fax it and this will cause a delay with your forms. Fees charged for forms are $25 for initial submission and $15 for recertifications. If you have questions on the status of your forms please call the forms department at [phone].  **We do have a 3 week policy for all forms and paperwork, please make sure to allow plenty of time for completion. Same day paperwork will not be completed. **     Spine Navigator  You will have a telehealth visit with our spine navigator approximately 2 weeks before your surgery. This class is NOT optional like the spine class listed below. This appointment will get booked when you schedule your spinal surgery.       Please visit the following website for the detailed and up-to-date visitor  screening and restriction policy at [facility] [email or web address].     When speaking with Pre-admissions you will be told about a spine class as it relates to your surgery. Although the class is not mandatory, you are strongly encouraged to attend. Make sure to bring your surgical binder to the class     The Pre-op Spine Surgery Class is held at [facility] most Wednesdays from 4:00-5:00 pm.  Call Pre-admission Testing (PAT) to register at:  [phone].     Please park in the [facility] Parking garage, check in at registration and meet in the [facility] lobby for your escort to class on the Ortho Spine unit.     The spine class is also available online at [email or web address].      Hibiclens Bathing  Hibiclens is a body soap that is used before surgery protect you from getting an infection after surgery   Hibiclens comes in a large blue bottle and can be found in most pharmacies in the First Aid supplies  Shower with this daily for FIVE consecutive days before surgery, using the entire bottle over the five days.  The last shower should be the night before surgery.   Steps to bathing with Hibiclens  Do not use Hibiclens on your hair, face or private areas  Wash your hair and face as normal with your usual cleansers  Rinse well  Using a clean wet washcloth apply enough Hibiclens to cover your body. Wash from the neck down avoiding the genital areas and concentrating on the surgical area  Rinse well  Dry yourself with a clean, dry towel  Do not use any powders, creams, lotions or sprays on your body as these attract bacteria  Deodorant and facial creams are acceptable.   (Laundering/cleaning: Chlorhexidine gluconate skin cleansers will cause stains if used with chlorine releasing products. Rinse completely and use only non-chlorine detergents.)  For office use only:   Surgical clearances needed: PCP  Prior-Auth needed: [facility]  CPT Codes:

## 2024-02-20 ENCOUNTER — APPOINTMENT (OUTPATIENT)
Dept: GENERAL RADIOLOGY | Facility: HOSPITAL | Age: 49
End: 2024-02-20
Attending: ANESTHESIOLOGY
Payer: COMMERCIAL

## 2024-02-20 ENCOUNTER — HOSPITAL ENCOUNTER (OUTPATIENT)
Facility: HOSPITAL | Age: 49
Setting detail: HOSPITAL OUTPATIENT SURGERY
Discharge: HOME | End: 2024-02-20
Attending: ANESTHESIOLOGY | Admitting: ANESTHESIOLOGY
Payer: COMMERCIAL

## 2024-02-20 VITALS
TEMPERATURE: 98 F | WEIGHT: 141 LBS | BODY MASS INDEX: 23.21 KG/M2 | SYSTOLIC BLOOD PRESSURE: 108 MMHG | HEART RATE: 93 BPM | OXYGEN SATURATION: 100 % | HEIGHT: 65.5 IN | RESPIRATION RATE: 16 BRPM | DIASTOLIC BLOOD PRESSURE: 75 MMHG

## 2024-02-20 PROCEDURE — 20605 DRAIN/INJ JOINT/BURSA W/O US: CPT | Performed by: ANESTHESIOLOGY

## 2024-02-20 PROCEDURE — 3E0U33Z INTRODUCTION OF ANTI-INFLAMMATORY INTO JOINTS, PERCUTANEOUS APPROACH: ICD-10-PCS | Performed by: ANESTHESIOLOGY

## 2024-02-20 PROCEDURE — 77002 NEEDLE LOCALIZATION BY XRAY: CPT | Performed by: ANESTHESIOLOGY

## 2024-02-20 RX ORDER — DIPHENHYDRAMINE HYDROCHLORIDE 50 MG/ML
50 INJECTION INTRAMUSCULAR; INTRAVENOUS ONCE AS NEEDED
OUTPATIENT
Start: 2024-02-20 | End: 2024-02-20

## 2024-02-20 RX ORDER — MIDAZOLAM HYDROCHLORIDE 1 MG/ML
INJECTION INTRAMUSCULAR; INTRAVENOUS
Status: DISCONTINUED | OUTPATIENT
Start: 2024-02-20 | End: 2024-02-20

## 2024-02-20 RX ORDER — LIDOCAINE HYDROCHLORIDE 10 MG/ML
INJECTION, SOLUTION EPIDURAL; INFILTRATION; INTRACAUDAL; PERINEURAL
Status: DISCONTINUED | OUTPATIENT
Start: 2024-02-20 | End: 2024-02-20

## 2024-02-20 RX ORDER — METHYLPREDNISOLONE ACETATE 40 MG/ML
INJECTION, SUSPENSION INTRA-ARTICULAR; INTRALESIONAL; INTRAMUSCULAR; SOFT TISSUE
Status: DISCONTINUED | OUTPATIENT
Start: 2024-02-20 | End: 2024-02-20

## 2024-02-20 RX ORDER — ONDANSETRON 2 MG/ML
4 INJECTION INTRAMUSCULAR; INTRAVENOUS ONCE AS NEEDED
OUTPATIENT
Start: 2024-02-20 | End: 2024-02-20

## 2024-02-20 RX ORDER — SODIUM CHLORIDE, SODIUM LACTATE, POTASSIUM CHLORIDE, CALCIUM CHLORIDE 600; 310; 30; 20 MG/100ML; MG/100ML; MG/100ML; MG/100ML
100 INJECTION, SOLUTION INTRAVENOUS CONTINUOUS
Status: DISCONTINUED | OUTPATIENT
Start: 2024-02-20 | End: 2024-02-20

## 2024-02-20 RX ORDER — ONDANSETRON 2 MG/ML
4 INJECTION INTRAMUSCULAR; INTRAVENOUS ONCE AS NEEDED
Status: DISCONTINUED | OUTPATIENT
Start: 2024-02-20 | End: 2024-02-20

## 2024-02-20 RX ORDER — SODIUM CHLORIDE 9 MG/ML
INJECTION, SOLUTION INTRAMUSCULAR; INTRAVENOUS; SUBCUTANEOUS
Status: DISCONTINUED | OUTPATIENT
Start: 2024-02-20 | End: 2024-02-20

## 2024-02-20 NOTE — H&P
History & Physical Examination    Patient Name: Vita Gillis  MRN: [id number]  [facility]: [id number]  YOB: 1975    Pre-Operative Diagnosis:  Coccydynia [M53.3]    Present Illness: Coccydynia    Medications Prior to Admission   Medication Sig Dispense Refill Last Dose    clobetasol 0.05 % External Solution APPLY TO THE AFFECTED AREA OF SCALP TWICE DAILY TO ITCHY AREAS       hydrocortisone 2.5 % External Cream        ketoconazole 2 % External Cream        ketoconazole 2 % External Shampoo        Norethindrone (ORTHO MICRONOR) 0.35 MG Oral Tab Take 1 tablet (0.35 mg total) by mouth daily. 84 tablet 3     lamoTRIgine 150 MG Oral Tab Take 1 tablet (150 mg total) by mouth 2 (two) times daily. 180 tablet 0     CLONIDINE 0.1 MG Oral Tab TAKE 1 TABLET(0.1 MG) BY MOUTH TWICE DAILY 180 tablet 0     desvenlafaxine  MG Oral Tablet 24 Hr TAKE 1 TABLET(100 MG) BY MOUTH DAILY 30 tablet 2     SUMAtriptan Succinate 100 MG Oral Tab TAKE 1 TABLET BY MOUTH WITHIN 30 MINUTES OF HEADACHE ONSET 9 tablet 0     OMEPRAZOLE 20 MG Oral Capsule Delayed Release TAKE 1 CAPSULE(20 MG) BY MOUTH TWICE DAILY BEFORE MEALS 180 capsule 2     omega-3 fatty acids 1000 MG Oral Cap Take 1,000 mg by mouth daily.        Current Facility-Administered Medications   Medication Dose Route Frequency    lactated ringers infusion  100 mL/hr Intravenous Continuous    ondansetron (Zofran) 4 MG/2ML injection 4 mg  4 mg Intravenous Once PRN       Allergies: No Known Allergies    Past Medical History:   Diagnosis Date    Abdominal distention     Abdominal pain     Allergic rhinitis     Anxiety state, unspecified     Back pain     Bad breath     Belching     Bloating     Blood in the stool     Constipation     Depression     Dysmenorrhea     Esophageal reflux     Frequent use of laxatives     Heartburn     Hemorrhoids     High cholesterol     History of depression     History of stomach ulcers     Indigestion     Irregular bowel habits     Loss of  appetite     Migraine     Migraines     Nausea     Obesity     Other and unspecified hyperlipidemia     Pain with bowel movements     Problems with swallowing     Sleep disturbance     Stress     Uncomfortable fullness after meals     Weight gain     Weight loss      Past Surgical History:   Procedure Laterality Date    COLONOSCOPY      COLPOSCOPY, CERVIX W/UPPER ADJACENT VAGINA; W/BIOPSY(S), CERVIX  2008    HPV+    FRACTURE SURGERY      repair of nasal fracture    GASTRO - DMG  8/16    healed ulcers    OTHER      wisdom teeth extraction    OTHER SURGICAL HISTORY      nose repair 2014    UP GI ENDOSCOPY,BALL DIL,30MM N/A 8/9/2016    Procedure: ESOPHAGOGASTRODUODENOSCOPY, POSSIBLE BIOPSY, POSSIBLE POLYPECTOMY 78796;  Surgeon: Sukh Samayoa MD;  Location: [facility]    UPPER GI ENDO NO BARRETTS  5/16    esophageal ulcer; HH    UPPER GI ENDOSCOPY,BIOPSY N/A 5/17/2016    Procedure: ESOPHAGOGASTRODUODENOSCOPY, POSSIBLE BIOPSY, POSSIBLE POLYPECTOMY 83074;  Surgeon: Sukh Samayoa MD;  Location: [facility]     Family History   Problem Relation Age of Onset    Alcohol and Other Disorders Associated Father     Homicide History Father         father strangled mother, patient age 8    Psychiatric Sister         postpartum depression    Depression Son     Other (Other) Son         Duchennes muscular dystrophy    Cancer Maternal Grandmother         lung, brain    Breast Cancer Maternal Grandmother 52    Diabetes Maternal Grandmother     Stroke Maternal Grandmother     Anxiety Maternal Grandmother         undiagnosed    Cancer Maternal Grandfather     Stroke Maternal Grandfather     Lipids Maternal Grandfather     Diabetes Maternal Grandfather     Heart Attack Maternal Grandfather     Diabetes Paternal Grandmother     Depression Paternal Grandmother     Diabetes Paternal Grandfather     Crohn's Disease Maternal Aunt     Breast Cancer Maternal Aunt 62    Depression Other         pat uncle    Suicide History  Other         pat uncle completed     Social History     Tobacco Use    Smoking status: Former     Packs/day: 1.00     Years: 18.00     Additional pack years: 0.00     Total pack years: 18.00     Types: Cigarettes     Quit date: 2008     Years since quittin.0    Smokeless tobacco: Never    Tobacco comments:     non-smoker   Substance Use Topics    Alcohol use: Yes     Alcohol/week: 2.0 standard drinks of alcohol     Types: 2 Standard drinks or equivalent per week     Comment: socially       SYSTEM Check if Review is Normal Check if Physical Exam is Normal If not normal, please explain:   HEENT [x ] [x ]    NECK & BACK [x ] [x ]    HEART [x ] [x ]    LUNGS [x ] [x ]    ABDOMEN [x ] [x ]    UROGENITAL [x ] [x ]    EXTREMITIES [x ] [x ]    OTHER        [ x ] I have discussed the risks and benefits and alternatives with the patient/family.  They understand and agree to proceed with plan of care.  [ x ] I have reviewed the History and Physical done within the last 30 days.  Any changes noted above.    Ryan Mccarty MD

## 2024-02-20 NOTE — DISCHARGE INSTRUCTIONS
Home Care Instructions Following Your Pain Procedure     Vita,  It has been a pleasure to have you as our patient. To help you at home, you must follow these general discharge instructions. We will review these with you before you are discharged. It is our hope that you have a complete and uneventful recovery from our procedure.     General Instructions:  What to Expect:  Bandages from your procedure today can be removed when you get home.  Please avoid soaking and/or swimming for 24 hours.  Showering is okay  It is normal to have increased pain symptoms and/or pain at injection site for up to 3-5 days after procedure, you can use heat or ice (20 minutes on 20 minutes off) for comfort.  You may experience some temporary side effects which may include restlessness or insomnia, flushing of the face, or heart palpitations.  Please contact the provider if these symptoms do not resolve within 3-4 days.  Lightheadedness or nausea may occur and should resolve within 24 to 48 hours.  If you develop a headache after treatment, rest, drink fluids (with caffeine, if possible) and take mild over-the-counter pain medication.  If the headache does not improve with the above treatment, contact the physician.  Home Medications:  Resume all previously prescribed medication.  Please avoid taking NSAIDs (Non-Steriodal Anti-Inflammatory Drugs) such as:  Ibuprofen ( Advil, Motrin) Aleve (Naproxen), Diclofenac, Meloxicam for 6 hours after procedure.   If you are on Coumadin (Warfarin) or any other anti-coagulant (or \"blood thinning\") medication such as Plavix (Clopidogrel), Xarelto (Rivaroxaban), Eliquis (Apixaban), Effient (Prasugrel) etc., restart on the following day from the procedure unless otherwise directed by your provider.  If you are a diabetic, please increase the frequency of your glucose monitoring after the procedure as steroids may cause a temporary (2-3 day) increase in your blood sugar.  Contact your primary care  physician if your blood sugar remains elevated as you may require some medication adjustment.  Diet:  Resume your regular diet as tolerated.  Activity:  We recommend that you relax and rest during the rest of your procedure day.  If you feel weakness in your arms or legs do not drive.  Follow-up Appointment  Please schedule a follow-up visit within 3 to 4 weeks after your last procedure date.  Question or Concerns:  Feel free to call our office with any questions or concerns at [phone] (option #2)    Vita  Thank you for coming to [facility] for your procedure.  The nurses try very hard to make sure you receive the best care possible.  Your trust in them as well as us is greatly appreciated.    Thanks so much,   Dr. Ryan Gutiérrez have been given medication that makes you sleepy. This may have included both pain medication and sleeping medication. Most of the effects have worn off but you may still have some drowsiness for the next 6 to 8 hours.    Home Care  Follow these guidelines when you get home:    --Don't drink any alcohol for the next 24 hours.    --Don't drive, operate dangerous machinery, make important business or personal    decisions, or sign legal documents during the next 24 hours.    Follow Up Care  Follow up with your healthcare provider if you are not alert and back to your usual level of activity within 12 hours    When to seek medical advice  Call your healthcare provider right away if any of these occur:    --Drowsiness that gets worse  --Weakness or dizziness that gets worse  --Repeated vomiting  --You can not be awakened   Home Care Instructions Following Your Pain Procedure     Vita,  It has been a pleasure to have you as our patient. To help you at home, you must follow these general discharge instructions. We will review these with you before you are discharged. It is our hope that you have a complete and uneventful recovery from our procedure.     General  Instructions:  What to Expect:  Bandages from your procedure today can be removed when you get home.  Please avoid soaking and/or swimming for 24 hours.  Showering is okay  It is normal to have increased pain symptoms and/or pain at injection site for up to 3-5 days after procedure, you can use heat or ice (20 minutes on 20 minutes off) for comfort.  You may experience some temporary side effects which may include restlessness or insomnia, flushing of the face, or heart palpitations.  Please contact the provider if these symptoms do not resolve within 3-4 days.  Lightheadedness or nausea may occur and should resolve within 24 to 48 hours.  If you develop a headache after treatment, rest, drink fluids (with caffeine, if possible) and take mild over-the-counter pain medication.  If the headache does not improve with the above treatment, contact the physician.  Home Medications:  Resume all previously prescribed medication.  Please avoid taking NSAIDs (Non-Steriodal Anti-Inflammatory Drugs) such as:  Ibuprofen ( Advil, Motrin) Aleve (Naproxen), Diclofenac, Meloxicam for 6 hours after procedure.   If you are on Coumadin (Warfarin) or any other anti-coagulant (or \"blood thinning\") medication such as Plavix (Clopidogrel), Xarelto (Rivaroxaban), Eliquis (Apixaban), Effient (Prasugrel) etc., restart on the following day from the procedure unless otherwise directed by your provider.  If you are a diabetic, please increase the frequency of your glucose monitoring after the procedure as steroids may cause a temporary (2-3 day) increase in your blood sugar.  Contact your primary care physician if your blood sugar remains elevated as you may require some medication adjustment.  Diet:  Resume your regular diet as tolerated.  Activity:  We recommend that you relax and rest during the rest of your procedure day.  If you feel weakness in your arms or legs do not drive.  Follow-up Appointment  Please schedule a follow-up visit within  3 to 4 weeks after your last procedure date.  Question or Concerns:  Feel free to call our office with any questions or concerns at [phone] (option #2)    Vita  Thank you for coming to [facility] for your procedure.  The nurses try very hard to make sure you receive the best care possible.  Your trust in them as well as us is greatly appreciated.    Thanks so much,   Dr. Ryan Mccarty

## 2024-02-20 NOTE — OPERATIVE REPORT
[facility]  Operative Report  2024     Vita Gillis Patient Status:  Hospital Outpatient Surgery    1975 MRN [id number]   Location [facility] Attending Ryan Mccarty MD   Hosp Day # 0 PCP Cary Bacon MD     Indication: Vita is a 48 year old female with coccydynia    Preoperative Diagnosis:  Coccydynia [M53.3]    Postoperative Diagnosis: Same as above.    Procedure performed: COCCYGEAL INJECTION with sedation    Anesthesia: Local and IV Sedation.    EBL: Less than 1 ml.    Procedure Description:   After reviewing the patient's history and performing a focused physical examination, the diagnosis was confirmed and contraindications such as infection and coagulopathy were ruled out.  Following review of potential side effects and complications, including but not necessarily limited to infection, allergic reaction, local tissue breakdown, nerve injury, and paresis, the patient indicated they understood and agreed to proceed. After obtaining the informed consent, the patient was brought to the procedure room and monitored. Per my order and under my supervision, the patient was sedated with intermittent intravenous doses of versed and fentanyl. The vital signs were monitored and recorded by an experienced RN. The procedure started after the patient was adequately sedated. The moderate intravenous conscious sedation was provided for 5 minutes.    The patient was brought to the procedure room and positioned prone on the procedure table.  After comprehensive monitors were applied, the lumbosacral area was prepped and draped sterilely.  After local anesthetic was instilled in the skin and subcutaneous tissue, a 22 gauge spinal needle was introduced and advanced under fluoroscopy to contact bone along the distal tip of the coccyx.  Reconfirmation of bony contact and position of the needle on both AP and lateral views, a combination of normal saline and Depo-Medrol  40 mg was injected in a total volume of 10 ml. The needle was flushed with 1 mL 1% PF lidocaine.  The patient tolerated the procedure very well and was discharged to a responsible adult after discharge criteria were met.     Complications: None.    Follow up: Clinic        Ryan Mccarty MD

## 2024-02-21 ENCOUNTER — TELEPHONE (OUTPATIENT)
Dept: PAIN CLINIC | Facility: CLINIC | Age: 49
End: 2024-02-21

## 2024-02-21 NOTE — TELEPHONE ENCOUNTER
Nazanin called placed to patient for post procedure follow up. Patient stated 'I'm ok but a little sore\". Informed patient that soreness is to be expected after the procedure. Educated patient that it takes 3-5 days for the steroid to be effective and to allow adequate time for medication to work. Encouraged patient to alternate ice and heat and to take medications as prescribed. Pt verbalized understanding to call with any questions or concerns.      Procedure: COCCYGEAL INJECTION   Date: 2/20/2024  Follow up Visit Scheduled: 3/8/2024 with Shubham Nance

## 2024-03-04 NOTE — TELEPHONE ENCOUNTER
Patient is scheduled for C6-C7 Artifical Disc Replacement on 3.28.24 with Dr Beckford at [facility].    Y pre-op apt scheduled (if sx is more than 30 days from last apt)  Ypre-op apt scheduled with RN spine navigator  Y Surgical instructions reviewed by nursing staff with patient  Y  form completed  Y Surgery order signed   Y Placed sx on surgery sheet  Y Placed on outlook calendar  Y [employer] message sent to patient with sx instructions  Y Faxed pre-op clearance request to PCP LETY SKAGGS Faxed letter to prescribing provider requesting anticoagulants be held for surgery  Y E-mail sent to [employer]  Y Post-op appointments made  Y PA NEEDED. Routed to PA team to initiate.  Y Post-Op outreach pt reminder placed.   Y Entire Neurosurgery Checklist Completed    Clearances: PCP  PA:[facility]  CPT Codes: 47280, 23188

## 2024-03-06 ENCOUNTER — OFFICE VISIT (OUTPATIENT)
Dept: FAMILY MEDICINE CLINIC | Facility: CLINIC | Age: 49
End: 2024-03-06
Payer: COMMERCIAL

## 2024-03-06 ENCOUNTER — LAB ENCOUNTER (OUTPATIENT)
Dept: LAB | Age: 49
End: 2024-03-06
Attending: NURSE PRACTITIONER
Payer: COMMERCIAL

## 2024-03-06 VITALS
HEART RATE: 87 BPM | BODY MASS INDEX: 23.27 KG/M2 | TEMPERATURE: 97 F | DIASTOLIC BLOOD PRESSURE: 78 MMHG | WEIGHT: 138 LBS | HEIGHT: 64.5 IN | OXYGEN SATURATION: 97 % | SYSTOLIC BLOOD PRESSURE: 106 MMHG

## 2024-03-06 DIAGNOSIS — Z13.220 SCREENING CHOLESTEROL LEVEL: ICD-10-CM

## 2024-03-06 DIAGNOSIS — M54.12 CERVICAL RADICULOPATHY: ICD-10-CM

## 2024-03-06 DIAGNOSIS — Z01.818 PREOPERATIVE CLEARANCE: Primary | ICD-10-CM

## 2024-03-06 DIAGNOSIS — G43.109 MIGRAINE WITH AURA AND WITHOUT STATUS MIGRAINOSUS, NOT INTRACTABLE: ICD-10-CM

## 2024-03-06 DIAGNOSIS — F33.2 SEVERE EPISODE OF RECURRENT MAJOR DEPRESSIVE DISORDER, WITHOUT PSYCHOTIC FEATURES (HCC): ICD-10-CM

## 2024-03-06 DIAGNOSIS — Z01.818 PREOPERATIVE CLEARANCE: ICD-10-CM

## 2024-03-06 DIAGNOSIS — F43.20 PERSISTENT ADJUSTMENT DISORDER: ICD-10-CM

## 2024-03-06 DIAGNOSIS — F41.1 GAD (GENERALIZED ANXIETY DISORDER): ICD-10-CM

## 2024-03-06 DIAGNOSIS — R12 CHRONIC HEARTBURN: ICD-10-CM

## 2024-03-06 DIAGNOSIS — F43.10 PTSD (POST-TRAUMATIC STRESS DISORDER): ICD-10-CM

## 2024-03-06 DIAGNOSIS — F39 MOOD DISORDER (HCC): ICD-10-CM

## 2024-03-06 DIAGNOSIS — K25.7 CHRONIC GASTRIC ULCER WITHOUT HEMORRHAGE AND WITHOUT PERFORATION: ICD-10-CM

## 2024-03-06 LAB
BASOPHILS # BLD AUTO: 0.03 X10(3) UL (ref 0–0.2)
BASOPHILS NFR BLD AUTO: 0.4 %
EOSINOPHIL # BLD AUTO: 0 X10(3) UL (ref 0–0.7)
EOSINOPHIL NFR BLD AUTO: 0 %
ERYTHROCYTE [DISTWIDTH] IN BLOOD BY AUTOMATED COUNT: 13.2 %
HCT VFR BLD AUTO: 41.2 %
HGB BLD-MCNC: 14.2 G/DL
IMM GRANULOCYTES # BLD AUTO: 0.02 X10(3) UL (ref 0–1)
IMM GRANULOCYTES NFR BLD: 0.3 %
LYMPHOCYTES # BLD AUTO: 1.88 X10(3) UL (ref 1–4)
LYMPHOCYTES NFR BLD AUTO: 23.8 %
MCH RBC QN AUTO: 28.8 PG (ref 26–34)
MCHC RBC AUTO-ENTMCNC: 34.5 G/DL (ref 31–37)
MCV RBC AUTO: 83.6 FL
MONOCYTES # BLD AUTO: 0.4 X10(3) UL (ref 0.1–1)
MONOCYTES NFR BLD AUTO: 5.1 %
NEUTROPHILS # BLD AUTO: 5.56 X10 (3) UL (ref 1.5–7.7)
NEUTROPHILS # BLD AUTO: 5.56 X10(3) UL (ref 1.5–7.7)
NEUTROPHILS NFR BLD AUTO: 70.4 %
PLATELET # BLD AUTO: 240 10(3)UL (ref 150–450)
RBC # BLD AUTO: 4.93 X10(6)UL
WBC # BLD AUTO: 7.9 X10(3) UL (ref 4–11)

## 2024-03-06 PROCEDURE — 36415 COLL VENOUS BLD VENIPUNCTURE: CPT

## 2024-03-06 PROCEDURE — 99214 OFFICE O/P EST MOD 30 MIN: CPT | Performed by: NURSE PRACTITIONER

## 2024-03-06 PROCEDURE — 85730 THROMBOPLASTIN TIME PARTIAL: CPT

## 2024-03-06 PROCEDURE — 85025 COMPLETE CBC W/AUTO DIFF WBC: CPT

## 2024-03-06 PROCEDURE — 85610 PROTHROMBIN TIME: CPT

## 2024-03-06 PROCEDURE — 80053 COMPREHEN METABOLIC PANEL: CPT

## 2024-03-06 PROCEDURE — 80061 LIPID PANEL: CPT

## 2024-03-06 PROCEDURE — 87081 CULTURE SCREEN ONLY: CPT

## 2024-03-06 NOTE — PROGRESS NOTES
Vita Gillis is a 48 year old female who presents for a pre-operative physical exam.     Patient is scheduled for a C6-C7 ADR procedure at [facility] on 3/28/24 performed by Dr Beckford.   Indication: Cervical Radiculopathy    She has had previous anesthesia:  Yes    Previous complications:  No  Personal or Family History Malignant Hyperthermia: No    Respiratory Disease: No  Cardiac Disease: No  Sleep Apnea: No    Social History     Socioeconomic History    Marital status:    Tobacco Use    Smoking status: Former     Packs/day: 1.00     Years: 18.00     Additional pack years: 0.00     Total pack years: 18.00     Types: Cigarettes     Quit date: 2008     Years since quittin.1    Smokeless tobacco: Never    Tobacco comments:     non-smoker   Vaping Use    Vaping Use: Never used   Substance and Sexual Activity    Alcohol use: Yes     Alcohol/week: 2.0 standard drinks of alcohol     Types: 2 Standard drinks or equivalent per week     Comment: socially    Drug use: Not Currently    Sexual activity: Yes     Partners: Male     Birth control/protection: Vasectomy, Implant   Other Topics Concern    Caffeine Concern Yes     Comment: 1 cup of coffee x day    Exercise Yes     Comment: 2 x week, cardio    Seat Belt Yes      Past Medical History:   Diagnosis Date    Abdominal distention     Abdominal pain     Allergic rhinitis     Anxiety state, unspecified     Back pain     Bad breath     Belching     Bloating     Blood in the stool     Constipation     Depression     Dysmenorrhea     Esophageal reflux     Frequent use of laxatives     Heartburn     Hemorrhoids     High cholesterol     History of depression     History of stomach ulcers     Indigestion     Irregular bowel habits     Loss of appetite     Migraine     Migraines     Nausea     Obesity     Other and unspecified hyperlipidemia     Pain with bowel movements     Problems with swallowing     Sleep disturbance     Stress     Uncomfortable  fullness after meals     Weight gain     Weight loss      Past Surgical History:   Procedure Laterality Date    COLONOSCOPY      COLPOSCOPY, CERVIX W/UPPER ADJACENT VAGINA; W/BIOPSY(S), CERVIX  2008    HPV+    FRACTURE SURGERY      repair of nasal fracture    GASTRO - DMG  8/16    healed ulcers    OTHER      wisdom teeth extraction    OTHER SURGICAL HISTORY      nose repair 2014    UP GI ENDOSCOPY,BALL DIL,30MM N/A 8/9/2016    Procedure: ESOPHAGOGASTRODUODENOSCOPY, POSSIBLE BIOPSY, POSSIBLE POLYPECTOMY 48772;  Surgeon: Sukh Samayoa MD;  Location: [facility]    UPPER GI ENDO NO BARRETTS  5/16    esophageal ulcer; HH    UPPER GI ENDOSCOPY,BIOPSY N/A 5/17/2016    Procedure: ESOPHAGOGASTRODUODENOSCOPY, POSSIBLE BIOPSY, POSSIBLE POLYPECTOMY 18504;  Surgeon: Sukh Samayoa MD;  Location: [facility]     Allergies: No Known Allergies  Current Outpatient Medications   Medication Sig Dispense Refill    lurasidone 20 MG Oral Tab Take 1 tablet (20 mg total) by mouth daily with food. 30 tablet 2    [START ON 3/9/2024] desvenlafaxine  MG Oral Tablet 24 Hr Take 1 tablet (100 mg total) by mouth daily. 30 tablet 2    clobetasol 0.05 % External Solution APPLY TO THE AFFECTED AREA OF SCALP TWICE DAILY TO ITCHY AREAS      hydrocortisone 2.5 % External Cream       ketoconazole 2 % External Cream       ketoconazole 2 % External Shampoo       Norethindrone (ORTHO MICRONOR) 0.35 MG Oral Tab Take 1 tablet (0.35 mg total) by mouth daily. 84 tablet 3    lamoTRIgine 150 MG Oral Tab Take 1 tablet (150 mg total) by mouth 2 (two) times daily. 180 tablet 0    CLONIDINE 0.1 MG Oral Tab TAKE 1 TABLET(0.1 MG) BY MOUTH TWICE DAILY 180 tablet 0    SUMAtriptan Succinate 100 MG Oral Tab TAKE 1 TABLET BY MOUTH WITHIN 30 MINUTES OF HEADACHE ONSET 9 tablet 0    OMEPRAZOLE 20 MG Oral Capsule Delayed Release TAKE 1 CAPSULE(20 MG) BY MOUTH TWICE DAILY BEFORE MEALS 180 capsule 2        LABORATORY DATA:   CBC:  Lab Results   Component  Value Date    WBC 5.4 10/09/2023    WBC 8.4 11/28/2022    WBC 6.9 07/19/2022     Lab Results   Component Value Date    HEMOGLOBIN 12.0 10/06/2017    HGB 13.1 10/09/2023    HGB 12.9 11/28/2022    HGB 12.6 07/19/2022      Lab Results   Component Value Date    .0 10/09/2023    .0 11/28/2022    .0 07/19/2022      BMP:   No results found for: \"GLUCOSE\"  Lab Results   Component Value Date    K 4.1 10/09/2023    K 4.3 11/28/2022    K 4.2 07/19/2022     Lab Results   Component Value Date    BUN 15 10/09/2023    BUN 13 11/28/2022    BUN 16 07/19/2022     Lab Results   Component Value Date    CREATSERUM 0.88 10/09/2023    CREATSERUM 0.94 11/28/2022    CREATSERUM 0.97 07/19/2022      PT/INR:   Lab Results   Component Value Date    INR 1.07 11/30/2016        REVIEW OF SYSTEMS:   Review of Systems   Constitutional:  Negative for chills, fever and malaise/fatigue.   HENT:  Negative for congestion, ear pain, sinus pain and sore throat.    Respiratory:  Negative for cough and shortness of breath.    Cardiovascular:  Negative for chest pain, palpitations and orthopnea.   Gastrointestinal:  Negative for abdominal pain, constipation, diarrhea, nausea and vomiting.   Genitourinary:  Negative for dysuria, frequency and hematuria.   Musculoskeletal:  Positive for neck pain. Negative for falls.   Skin:  Negative for itching and rash.   Neurological:  Positive for tingling (from neck issue, in right hand). Negative for headaches.   Endo/Heme/Allergies:  Negative for polydipsia.        PHYSICAL EXAM:   /78 (BP Location: Left arm, Patient Position: Sitting, Cuff Size: adult)   Pulse 87   Temp 97.4 °F (36.3 °C)   Ht 5' 4.5\" (1.638 m)   Wt 138 lb (62.6 kg)   LMP 02/25/2024 (Exact Date)   SpO2 97%   BMI 23.32 kg/m²    Physical Exam  Constitutional:       Appearance: Normal appearance. She is not ill-appearing.   HENT:      Right Ear: Tympanic membrane, ear canal and external ear normal.      Left Ear: Tympanic  membrane, ear canal and external ear normal.      Nose: Nose normal.      Mouth/Throat:      Mouth: Mucous membranes are moist.      Pharynx: Oropharynx is clear.   Eyes:      Conjunctiva/sclera: Conjunctivae normal.      Pupils: Pupils are equal, round, and reactive to light.   Cardiovascular:      Rate and Rhythm: Normal rate and regular rhythm.      Heart sounds: Normal heart sounds.   Pulmonary:      Effort: Pulmonary effort is normal.      Breath sounds: Normal breath sounds.   Abdominal:      General: Bowel sounds are normal.      Palpations: Abdomen is soft.   Skin:     General: Skin is warm and dry.      Findings: No rash.   Neurological:      Mental Status: She is alert.   Psychiatric:         Mood and Affect: Mood normal.          ASSESSMENT AND PLAN:   Lorie was seen today for pre-op exam.    Diagnoses and all orders for this visit:    Preoperative clearance  -     CBC W Differential W Platelet [E]; Future  -     Comp Metabolic Panel (14) [E]; Future  -     Prothrombin Time (PT) [E]; Future  -     PTT, Activated [E]; Future  -     MSSA and MRSA Culture Screen; Future    Cervical radiculopathy  -     CBC W Differential W Platelet [E]; Future  -     Comp Metabolic Panel (14) [E]; Future  -     Prothrombin Time (PT) [E]; Future  -     PTT, Activated [E]; Future  -     MSSA and MRSA Culture Screen; Future    Mood disorder (HCC)    Severe episode of recurrent major depressive disorder, without psychotic features (HCC)    Screening cholesterol level  -     Lipid Panel [E]; Future    Migraine with aura and without status migrainosus, not intractable    LIZ (generalized anxiety disorder)    Persistent adjustment disorder    PTSD (post-traumatic stress disorder)    Chronic gastric ulcer without hemorrhage and without perforation    Chronic heartburn    Check preop lab work today

## 2024-03-07 LAB
ALBUMIN SERPL-MCNC: 4.7 G/DL (ref 3.4–5)
ALBUMIN/GLOB SERPL: 1.5 {RATIO} (ref 1–2)
ALP LIVER SERPL-CCNC: 60 U/L
ALT SERPL-CCNC: 27 U/L
ANION GAP SERPL CALC-SCNC: 1 MMOL/L (ref 0–18)
APTT PPP: 32.8 SECONDS (ref 23.3–35.6)
AST SERPL-CCNC: 18 U/L (ref 15–37)
BILIRUB SERPL-MCNC: 0.7 MG/DL (ref 0.1–2)
BUN BLD-MCNC: 15 MG/DL (ref 9–23)
CALCIUM BLD-MCNC: 9.4 MG/DL (ref 8.5–10.1)
CHLORIDE SERPL-SCNC: 106 MMOL/L (ref 98–112)
CHOLEST SERPL-MCNC: 194 MG/DL (ref ?–200)
CO2 SERPL-SCNC: 31 MMOL/L (ref 21–32)
CREAT BLD-MCNC: 0.99 MG/DL
EGFRCR SERPLBLD CKD-EPI 2021: 71 ML/MIN/1.73M2 (ref 60–?)
FASTING PATIENT LIPID ANSWER: NO
FASTING STATUS PATIENT QL REPORTED: NO
GLOBULIN PLAS-MCNC: 3.2 G/DL (ref 2.8–4.4)
GLUCOSE BLD-MCNC: 93 MG/DL (ref 70–99)
HDLC SERPL-MCNC: 54 MG/DL (ref 40–59)
INR BLD: 1.14 (ref 0.8–1.2)
LDLC SERPL CALC-MCNC: 125 MG/DL (ref ?–100)
NONHDLC SERPL-MCNC: 140 MG/DL (ref ?–130)
OSMOLALITY SERPL CALC.SUM OF ELEC: 287 MOSM/KG (ref 275–295)
POTASSIUM SERPL-SCNC: 4 MMOL/L (ref 3.5–5.1)
PROT SERPL-MCNC: 7.9 G/DL (ref 6.4–8.2)
PROTHROMBIN TIME: 14.7 SECONDS (ref 11.6–14.8)
SODIUM SERPL-SCNC: 138 MMOL/L (ref 136–145)
TRIGL SERPL-MCNC: 83 MG/DL (ref 30–149)
VLDLC SERPL CALC-MCNC: 15 MG/DL (ref 0–30)

## 2024-03-08 ENCOUNTER — TELEPHONE (OUTPATIENT)
Dept: FAMILY MEDICINE CLINIC | Facility: CLINIC | Age: 49
End: 2024-03-08

## 2024-03-08 ENCOUNTER — OFFICE VISIT (OUTPATIENT)
Dept: PAIN CLINIC | Facility: CLINIC | Age: 49
End: 2024-03-08
Payer: COMMERCIAL

## 2024-03-08 VITALS — OXYGEN SATURATION: 97 % | DIASTOLIC BLOOD PRESSURE: 70 MMHG | HEART RATE: 88 BPM | SYSTOLIC BLOOD PRESSURE: 118 MMHG

## 2024-03-08 DIAGNOSIS — M53.3 COCCYDYNIA: Primary | ICD-10-CM

## 2024-03-08 PROCEDURE — 99214 OFFICE O/P EST MOD 30 MIN: CPT | Performed by: PHYSICIAN ASSISTANT

## 2024-03-08 NOTE — TELEPHONE ENCOUNTER
PCP presurgical clearance received per OV note in chart dated 3.6.24,   \"Preoperative labs reviewed  Patient is a good surgical candidate.   Patient's medical conditions are being optimally managed  Patient is cleared with low risk for upcoming proposed surgery.  This consult was sent back the referring physician\"     SHELL Darling

## 2024-03-08 NOTE — PROGRESS NOTES
Last procedure:   COCCYGEAL INJECTION with sedation     Date: 2/20/24  Percentage of relief obtained: 40%  Duration of relief: Onset of relief took about 6 days. Pt still has some pain, but has a little bit of relief    Current Pain Score: 5    Narcotic Contract Exp: NA

## 2024-03-08 NOTE — PATIENT INSTRUCTIONS
Refill policies:    Allow 2-3 business days for refills; controlled substances may take longer.  Contact your pharmacy at least 5 days prior to running out of medication and have them send an electronic request or submit request through the “request refill” option in your [employer] account.  Refills are not addressed on weekends; covering physicians do not authorize routine medications on weekends.  No narcotics or controlled substances are refilled after noon on Fridays or by on call physicians.  By law, narcotics must be electronically prescribed.  A 30 day supply with no refills is the maximum allowed.  If your prescription is due for a refill, you may be due for a follow up appointment.  To best provide you care, patients receiving routine medications need to be seen at least once a year.  Patients receiving narcotic/controlled substance medications need to be seen at least once every 3 months.  In the event that your preferred pharmacy does not have the requested medication in stock (e.g. Backordered), it is your responsibility to find another pharmacy that has the requested medication available.  We will gladly send a new prescription to that pharmacy at your request.    Scheduling Tests:    If your physician has ordered radiology tests such as MRI or CT scans, please contact Central Scheduling at [phone] right away to schedule the test.  Once scheduled, the [facility] Centralized Referral Team will work with your insurance carrier to obtain pre-certification or prior authorization.  Depending on your insurance carrier, approval may take 3-10 days.  It is highly recommended patients assure they have received an authorization before having a test performed.  If test is done without insurance authorization, patient may be responsible for the entire amount billed.      Precertification and Prior Authorizations:  If your physician has recommended that you have a procedure or additional testing performed the [facility]  Centralized Referral Team will contact your insurance carrier to obtain pre-certification or prior authorization.    You are strongly encouraged to contact your insurance carrier to verify that your procedure/test has been approved and is a COVERED benefit.  Although the [facility] Centralized Referral Team does its due diligence, the insurance carrier gives the disclaimer that \"Although the procedure is authorized, this does not guarantee payment.\"    Ultimately the patient is responsible for payment.   Thank you for your understanding in this matter.  Paperwork Completion:  If you require FMLA or disability paperwork for your recovery, please make sure to either drop it off or have it faxed to our office at [phone]. Be sure the form has your name and date of birth on it.  The form will be faxed to our Forms Department and they will complete it for you.  There is a 25$ fee for all forms that need to be filled out.  Please be aware there is a 10-14 day turnaround time.  You will need to sign a release of information (NGA) form if your paperwork does not come with one.  You may call the Forms Department with any questions at [phone].  Their fax number is [phone].

## 2024-03-08 NOTE — PROGRESS NOTES
Preoperative labs reviewed  Patient is a good surgical candidate.   Patient's medical conditions are being optimally managed  Patient is cleared with low risk for upcoming proposed surgery.  This consult was sent back the referring physician     SHELL Darling

## 2024-03-08 NOTE — PROGRESS NOTES
HPI:   Vita Gillis presents with complaints of neck and R UE pain, coccygeal pain.    The pain is described as moderate aching that is intermittent.  The patient’s activity level has remained the same since last visit.  The pain is worst unrelated to time of day.    Changes in condition/history since last visit: Patient is here today for follow-up, having had Coccygeal inj 2/20/24.  Procedure was well-tolerated, and had no adverse effects.  Overall, reports 40% relief, and while she is certainly still improved, pain continues on a daily basis.  Wishes to repeat procedure.      Last procedure: Coccygeal injection date: 2/20/2024    Percent relief: 40%    Duration: Sustained    Previous procedure: RAMSEY #1    date: 1/18/2024    Percentage of relief experienced from the procedure: 0%    Duration of the relief: N/A    The following activities will increase the patient’s pain: sitting    The following activities decrease the patient’s pain:  Standing    Functional Assessment: Patient reports that they are able to complete all of their ADL's such as eating, bathing, using the toilet, dressing and getting up from a bed or a chair independently.    Current Medications:  Current Outpatient Medications   Medication Sig Dispense Refill    lurasidone 20 MG Oral Tab Take 1 tablet (20 mg total) by mouth daily with food. 30 tablet 2    [START ON 3/9/2024] desvenlafaxine  MG Oral Tablet 24 Hr Take 1 tablet (100 mg total) by mouth daily. 30 tablet 2    clobetasol 0.05 % External Solution APPLY TO THE AFFECTED AREA OF SCALP TWICE DAILY TO ITCHY AREAS      hydrocortisone 2.5 % External Cream       ketoconazole 2 % External Cream       ketoconazole 2 % External Shampoo       Norethindrone (ORTHO MICRONOR) 0.35 MG Oral Tab Take 1 tablet (0.35 mg total) by mouth daily. 84 tablet 3    lamoTRIgine 150 MG Oral Tab Take 1 tablet (150 mg total) by mouth 2 (two) times daily. 180 tablet 0    CLONIDINE 0.1 MG Oral Tab TAKE 1  TABLET(0.1 MG) BY MOUTH TWICE DAILY 180 tablet 0    SUMAtriptan Succinate 100 MG Oral Tab TAKE 1 TABLET BY MOUTH WITHIN 30 MINUTES OF HEADACHE ONSET 9 tablet 0    OMEPRAZOLE 20 MG Oral Capsule Delayed Release TAKE 1 CAPSULE(20 MG) BY MOUTH TWICE DAILY BEFORE MEALS 180 capsule 2      Patient requires assistance with: No assistance required    Reviewed Patient History Dated: 2/20/2024 no changes noted    Physical Exam:   [facility] 02/25/2024 (Exact Date)   VAS Pain Score:  /10  General Appearance: Well developed, well nourished, normal build, independent body habitus, no apparent physical disabilities, well groomed    Neurological Exam: WNL-Orientation to time, place and person, normal mood & effect, normal concentration & attention span  Inspection: Nonantalgic, no acute distress   pain to palpation coccygeal tip  Radiology/Lab Test Reviewed: MRI C spine 11/9/23:     C2-C3:  No significant disc/facet abnormality, spinal stenosis, or foraminal narrowing.   C3-C4:  Mild disc desiccation mild disc height loss.  There is a mild right paracentral disc protrusion with slight superior and inferior extrusion measuring approximately 0.8 x 0.2 x 0.9 cm in craniocaudal, AP and transverse dimensions respectively.    There is mild effacement of the ventral thecal sac without significant central canal stenosis.  There is secondary mild right neural foraminal stenosis.   C4-C5:  Mild disc desiccation and minimal annular disc bulge.  No significant stenosis.   C5-C6:  Mild disc desiccation and minimal annular disc bulge.  No significant stenosis.   C6-C7:  Moderate disc desiccation with disc height loss and broad-based disc bulge/osteophyte complex, including bilateral uncovertebral joint disc/osteophyte complexes, greater on the right relative to the left.  There is secondary mild central canal stenosis and moderate to severe right neural foraminal stenosis.  No significant left foraminal stenosis.   C7-T1:  No significant disc/facet  abnormality, spinal stenosis, or foraminal narrowing.          Lab Results   Component Value Date    WBC 7.9 03/06/2024    WBC 5.4 10/09/2023    WBC 8.4 11/28/2022     Lab Results   Component Value Date    HEMOGLOBIN 12.0 10/06/2017     Lab Results   Component Value Date    .0 03/06/2024    .0 10/09/2023    .0 11/28/2022       Do you have any known blood/bleeding disorders?  No  Does patient currently take blood thinners?   None  Does patient currently take any antibiotics?   No  Patient educated and verbalized understanding.  Medical Decision Making:   Diagnosis:    Encounter Diagnosis   Name Primary?    Coccydynia Yes       Impression: With regards to cervical and right upper extremity complaints, no improvement with RAMSEY, and has been offered ADR with Dr. Beckford.  Scheduled to proceed at end of this month.      With regards to her coccygeal pain, this has been intermittently present for the past 5-6 months and has failed time, meds, HEP.  She is awaiting formal physical therapy, and was sent by Ortho for consideration of injection.  Will did proceed with coccygeal injection on 2/20/24, from which, she has had 40% relief.  Still with daily pain complaints, though less intense, and wishes to proceed with repeat injection.    Plan: Patient to schedule the following injection: Coccygeal injection Levels: N/A, Procedure and risks were discussed with pt. including headache, bleeding, infection and potential nerve damage.  With regards to cervical complaints, follow-up with neurosurgery, as planned.      No orders of the defined types were placed in this encounter.      Meds & Refills for this Visit:  Requested Prescriptions      No prescriptions requested or ordered in this encounter       Imaging & Consults:  None    The patient indicates understanding of these issues and agrees to the plan.    MICKI Hickman

## 2024-03-08 NOTE — TELEPHONE ENCOUNTER
----- Message from SHELL Duque sent at 3/8/2024 10:55 AM CST -----  LDL midlly elevated but not fasting. No meds at this point. Recheck in 6 months  MRSA screen negative  PT, PTT, INR normal  Chemistries normal  No anemia    Cleared for surgery. Note addendum done

## 2024-03-11 ENCOUNTER — TELEPHONE (OUTPATIENT)
Dept: PAIN CLINIC | Facility: CLINIC | Age: 49
End: 2024-03-11

## 2024-03-11 NOTE — TELEPHONE ENCOUNTER
Case change request placed to change the status of the surgery as outpatient in a bed with the stay being 24 hours or less

## 2024-03-11 NOTE — TELEPHONE ENCOUNTER
Prior authorization request completed for: coccygeal injection  Authorization # no auth needed   Pre-D: not recommended   Exclusions/Restrictions: no  Covered Benefit: yes  Authorization dates: n/a  CPT codes approved: 20605  Number of visits/dates of service approved: 1  Physician: preston  Location: [facility]   Call Ref#: [id number]  Representative Name: [place]  Insurance Carrier: [phone]    Patient can be scheduled. Routed to Navigator.

## 2024-03-12 NOTE — TELEPHONE ENCOUNTER
Prior Authorization for Outpatient  surgery initiated with Andrey MIXON at [facility] [phone].  Requesting coverage for:anterior cervical discectomy at cervical 6-7 with placement of arthroplasty device   Date of Service: 03/28/24   Inpatient days requested:Outpatient   CPT codes: 36018, 78544    Request for surgery pending review, pending reference #[id number]. Faxed clinical notes to [phone], confirmation received.      Per Andrey MIXON No Prior Auth Required but recommend predetermination.

## 2024-03-14 ENCOUNTER — APPOINTMENT (OUTPATIENT)
Dept: GENERAL RADIOLOGY | Facility: HOSPITAL | Age: 49
End: 2024-03-14
Attending: ANESTHESIOLOGY
Payer: COMMERCIAL

## 2024-03-14 ENCOUNTER — HOSPITAL ENCOUNTER (OUTPATIENT)
Facility: HOSPITAL | Age: 49
Setting detail: HOSPITAL OUTPATIENT SURGERY
Discharge: HOME | End: 2024-03-14
Attending: ANESTHESIOLOGY | Admitting: ANESTHESIOLOGY
Payer: COMMERCIAL

## 2024-03-14 ENCOUNTER — MED REC SCAN ONLY (OUTPATIENT)
Dept: SURGERY | Facility: CLINIC | Age: 49
End: 2024-03-14

## 2024-03-14 VITALS
SYSTOLIC BLOOD PRESSURE: 117 MMHG | WEIGHT: 138 LBS | HEIGHT: 66 IN | BODY MASS INDEX: 22.18 KG/M2 | DIASTOLIC BLOOD PRESSURE: 78 MMHG | RESPIRATION RATE: 18 BRPM | OXYGEN SATURATION: 100 % | TEMPERATURE: 99 F | HEART RATE: 107 BPM

## 2024-03-14 PROCEDURE — 3E0U3BZ INTRODUCTION OF ANESTHETIC AGENT INTO JOINTS, PERCUTANEOUS APPROACH: ICD-10-PCS | Performed by: ANESTHESIOLOGY

## 2024-03-14 PROCEDURE — 3E0U33Z INTRODUCTION OF ANTI-INFLAMMATORY INTO JOINTS, PERCUTANEOUS APPROACH: ICD-10-PCS | Performed by: ANESTHESIOLOGY

## 2024-03-14 PROCEDURE — 99152 MOD SED SAME PHYS/QHP 5/>YRS: CPT | Performed by: ANESTHESIOLOGY

## 2024-03-14 RX ORDER — SODIUM CHLORIDE 9 MG/ML
INJECTION, SOLUTION INTRAMUSCULAR; INTRAVENOUS; SUBCUTANEOUS
Status: DISCONTINUED | OUTPATIENT
Start: 2024-03-14 | End: 2024-03-14

## 2024-03-14 RX ORDER — LIDOCAINE HYDROCHLORIDE 10 MG/ML
INJECTION, SOLUTION EPIDURAL; INFILTRATION; INTRACAUDAL; PERINEURAL
Status: DISCONTINUED | OUTPATIENT
Start: 2024-03-14 | End: 2024-03-14

## 2024-03-14 RX ORDER — DIPHENHYDRAMINE HYDROCHLORIDE 50 MG/ML
50 INJECTION INTRAMUSCULAR; INTRAVENOUS ONCE AS NEEDED
OUTPATIENT
Start: 2024-03-14 | End: 2024-03-14

## 2024-03-14 RX ORDER — SODIUM CHLORIDE, SODIUM LACTATE, POTASSIUM CHLORIDE, CALCIUM CHLORIDE 600; 310; 30; 20 MG/100ML; MG/100ML; MG/100ML; MG/100ML
100 INJECTION, SOLUTION INTRAVENOUS CONTINUOUS
Status: DISCONTINUED | OUTPATIENT
Start: 2024-03-14 | End: 2024-03-14

## 2024-03-14 RX ORDER — MIDAZOLAM HYDROCHLORIDE 1 MG/ML
INJECTION INTRAMUSCULAR; INTRAVENOUS
Status: DISCONTINUED | OUTPATIENT
Start: 2024-03-14 | End: 2024-03-14

## 2024-03-14 RX ORDER — ONDANSETRON 2 MG/ML
4 INJECTION INTRAMUSCULAR; INTRAVENOUS ONCE AS NEEDED
Status: DISCONTINUED | OUTPATIENT
Start: 2024-03-14 | End: 2024-03-14

## 2024-03-14 RX ORDER — METHYLPREDNISOLONE ACETATE 40 MG/ML
INJECTION, SUSPENSION INTRA-ARTICULAR; INTRALESIONAL; INTRAMUSCULAR; SOFT TISSUE
Status: DISCONTINUED | OUTPATIENT
Start: 2024-03-14 | End: 2024-03-14

## 2024-03-14 RX ORDER — ONDANSETRON 2 MG/ML
4 INJECTION INTRAMUSCULAR; INTRAVENOUS ONCE AS NEEDED
OUTPATIENT
Start: 2024-03-14 | End: 2024-03-14

## 2024-03-14 NOTE — TELEPHONE ENCOUNTER
Completed R Predetermination Form for Anterior cervical discectomy at cervical 6-cervical 7 with placement of arthroplasty device with Dr. Beckford Scheduled for 3/28/2024     Provided completed for to prior auth department to submit.   Copy sent to scan with cover sheet Y  Copy kept in the office until verified in media Y

## 2024-03-14 NOTE — DISCHARGE INSTRUCTIONS
You have been given medication that makes you sleepy. This may have included both pain medication and sleeping medication. Most of the effects have worn off but you may still have some drowsiness for the next 6 to 8 hours.    Home Care  Follow these guidelines when you get home:    --Don't drink any alcohol for the next 24 hours.    --Don't drive, operate dangerous machinery, make important business or personal    decisions, or sign legal documents during the next 24 hours.    Follow Up Care  Follow up with your healthcare provider if you are not alert and back to your usual level of activity within 12 hours    When to seek medical advice  Call your healthcare provider right away if any of these occur:    --Drowsiness that gets worse  --Weakness or dizziness that gets worse  --Repeated vomiting  --You can not be awakened   Home Care Instructions Following Your Pain Procedure     Vita,  It has been a pleasure to have you as our patient. To help you at home, you must follow these general discharge instructions. We will review these with you before you are discharged. It is our hope that you have a complete and uneventful recovery from our procedure.     General Instructions:  What to Expect:  Bandages from your procedure today can be removed when you get home.  Please avoid soaking and/or swimming for 24 hours.  Showering is okay  It is normal to have increased pain symptoms and/or pain at injection site for up to 3-5 days after procedure, you can use heat or ice (20 minutes on 20 minutes off) for comfort.  You may experience some temporary side effects which may include restlessness or insomnia, flushing of the face, or heart palpitations.  Please contact the provider if these symptoms do not resolve within 3-4 days.  Lightheadedness or nausea may occur and should resolve within 24 to 48 hours.  If you develop a headache after treatment, rest, drink fluids (with caffeine, if possible) and take mild  over-the-counter pain medication.  If the headache does not improve with the above treatment, contact the physician.  Home Medications:  Resume all previously prescribed medication.  Please avoid taking NSAIDs (Non-Steriodal Anti-Inflammatory Drugs) such as:  Ibuprofen ( Advil, Motrin) Aleve (Naproxen), Diclofenac, Meloxicam for 6 hours after procedure.   If you are on Coumadin (Warfarin) or any other anti-coagulant (or \"blood thinning\") medication such as Plavix (Clopidogrel), Xarelto (Rivaroxaban), Eliquis (Apixaban), Effient (Prasugrel) etc., restart on the following day from the procedure unless otherwise directed by your provider.  If you are a diabetic, please increase the frequency of your glucose monitoring after the procedure as steroids may cause a temporary (2-3 day) increase in your blood sugar.  Contact your primary care physician if your blood sugar remains elevated as you may require some medication adjustment.  Diet:  Resume your regular diet as tolerated.  Activity:  We recommend that you relax and rest during the rest of your procedure day.  If you feel weakness in your arms or legs do not drive.  Follow-up Appointment  Please schedule a follow-up visit within 3 to 4 weeks after your last procedure date.  Question or Concerns:  Feel free to call our office with any questions or concerns at [phone] (option #2)    Vita  Thank you for coming to [facility] for your procedure.  The nurses try very hard to make sure you receive the best care possible.  Your trust in them as well as us is greatly appreciated.    Thanks so much,   Dr. Ryan Mccarty

## 2024-03-14 NOTE — H&P
History & Physical Examination    Patient Name: Vita Gillis  MRN: [id number]  [facility]: [id number]  YOB: 1975    Pre-Operative Diagnosis:  Coccydynia [M53.3]    Present Illness: Coccydynia    Medications Prior to Admission   Medication Sig Dispense Refill Last Dose    CLONIDINE 0.1 MG Oral Tab TAKE 1 TABLET(0.1 MG) BY MOUTH TWICE DAILY 180 tablet 0 3/13/2024    semaglutide 2 MG/1.5ML Subcutaneous Solution Pen-injector Inject 0.25 mg into the skin once a week. For weightloss  Sundays       lurasidone 20 MG Oral Tab Take 1 tablet (20 mg total) by mouth daily with food. 30 tablet 2     desvenlafaxine  MG Oral Tablet 24 Hr Take 1 tablet (100 mg total) by mouth daily. 30 tablet 2     clobetasol 0.05 % External Solution APPLY TO THE AFFECTED AREA OF SCALP TWICE DAILY TO ITCHY AREAS       hydrocortisone 2.5 % External Cream        ketoconazole 2 % External Cream        ketoconazole 2 % External Shampoo        Norethindrone (ORTHO MICRONOR) 0.35 MG Oral Tab Take 1 tablet (0.35 mg total) by mouth daily. 84 tablet 3     lamoTRIgine 150 MG Oral Tab Take 1 tablet (150 mg total) by mouth 2 (two) times daily. 180 tablet 0     SUMAtriptan Succinate 100 MG Oral Tab TAKE 1 TABLET BY MOUTH WITHIN 30 MINUTES OF HEADACHE ONSET 9 tablet 0     OMEPRAZOLE 20 MG Oral Capsule Delayed Release TAKE 1 CAPSULE(20 MG) BY MOUTH TWICE DAILY BEFORE MEALS (Patient taking differently: Take 1 capsule (20 mg total) by mouth 2 (two) times daily as needed.) 180 capsule 2      Current Facility-Administered Medications   Medication Dose Route Frequency    lactated ringers infusion  100 mL/hr Intravenous Continuous    ondansetron (Zofran) 4 MG/2ML injection 4 mg  4 mg Intravenous Once PRN       Allergies: No Known Allergies    Past Medical History:   Diagnosis Date    Abdominal distention     Abdominal pain     Allergic rhinitis     Anxiety state, unspecified     Back pain     Back problem     Bad breath     Belching     Bloating      Blood in the stool     Constipation     Depression     Dysmenorrhea     Esophageal reflux     Frequent use of laxatives     Heartburn     Hemorrhoids     High cholesterol     History of depression     History of stomach ulcers     Hx of motion sickness     Indigestion     Irregular bowel habits     Loss of appetite     Migraine     Migraines     Nausea     Obesity     Other and unspecified hyperlipidemia     Pain with bowel movements     Problems with swallowing     history of    Sleep disturbance     Stress     Uncomfortable fullness after meals     Weight gain     Weight loss      Past Surgical History:   Procedure Laterality Date    BUNIONECTOMY Bilateral     COLONOSCOPY      COLPOSCOPY, CERVIX W/UPPER ADJACENT VAGINA; W/BIOPSY(S), CERVIX  2008    HPV+    FRACTURE SURGERY      repair of nasal fracture    GASTRO - DMG  08/2016    healed ulcers    OTHER      wisdom teeth extraction    OTHER SURGICAL HISTORY      nose repair 2014    UP GI ENDOSCOPY,BALL DIL,30MM N/A 08/09/2016    Procedure: ESOPHAGOGASTRODUODENOSCOPY, POSSIBLE BIOPSY, POSSIBLE POLYPECTOMY 28458;  Surgeon: Sukh Samayoa MD;  Location: [facility]    UPPER GI ENDO NO BARRETTS  05/2016    esophageal ulcer; HH    UPPER GI ENDOSCOPY,BIOPSY N/A 05/17/2016    Procedure: ESOPHAGOGASTRODUODENOSCOPY, POSSIBLE BIOPSY, POSSIBLE POLYPECTOMY 18650;  Surgeon: Sukh Samayoa MD;  Location: [facility]     Family History   Problem Relation Age of Onset    Alcohol and Other Disorders Associated Father     Homicide History Father         father strangled mother, patient age 8    Psychiatric Sister         postpartum depression    Depression Son     Other (Other) Son         Duchennes muscular dystrophy    Cancer Maternal Grandmother         lung, brain    Breast Cancer Maternal Grandmother 52    Diabetes Maternal Grandmother     Stroke Maternal Grandmother     Anxiety Maternal Grandmother         undiagnosed    Cancer Maternal Grandfather      Stroke Maternal Grandfather     Lipids Maternal Grandfather     Diabetes Maternal Grandfather     Heart Attack Maternal Grandfather     Diabetes Paternal Grandmother     Depression Paternal Grandmother     Diabetes Paternal Grandfather     Crohn's Disease Maternal Aunt     Breast Cancer Maternal Aunt 62    Depression Other         pat uncle    Suicide History Other         pat uncle completed     Social History     Tobacco Use    Smoking status: Former     Packs/day: 1.00     Years: 18.00     Additional pack years: 0.00     Total pack years: 18.00     Types: Cigarettes     Quit date: 2008     Years since quittin.1    Smokeless tobacco: Never    Tobacco comments:     non-smoker   Substance Use Topics    Alcohol use: Not Currently     Comment: socially       SYSTEM Check if Review is Normal Check if Physical Exam is Normal If not normal, please explain:   HEENT [x ] [x ]    NECK & BACK [x ] [x ]    HEART [x ] [x ]    LUNGS [x ] [x ]    ABDOMEN [x ] [x ]    UROGENITAL [x ] [x ]    EXTREMITIES [x ] [x ]    OTHER        [ x ] I have discussed the risks and benefits and alternatives with the patient/family.  They understand and agree to proceed with plan of care.  [ x ] I have reviewed the History and Physical done within the last 30 days.  Any changes noted above.    Ryan Mccarty MD

## 2024-03-14 NOTE — OPERATIVE REPORT
[facility]  Operative Report  3/14/2024     Vita Gillis Patient Status:  Hospital Outpatient Surgery    1975 MRN [id number]   Location [facility] Attending Ryan Mccarty MD   Hosp Day # 0 PCP Cary Bacon MD     Indication: Vita is a 48 year old female coccydynia    Preoperative Diagnosis:  Coccydynia [M53.3]    Postoperative Diagnosis: Same as above.    Procedure performed: COCCYGEAL INJECTION with sedation    Anesthesia: Local and IV Sedation.    EBL: Less than 1 ml.    Procedure Description:   After reviewing the patient's history and performing a focused physical examination, the diagnosis was confirmed and contraindications such as infection and coagulopathy were ruled out.  Following review of potential side effects and complications, including but not necessarily limited to infection, allergic reaction, local tissue breakdown, nerve injury, and paresis, the patient indicated they understood and agreed to proceed. After obtaining the informed consent, the patient was brought to the procedure room and monitored. Per my order and under my supervision, the patient was sedated with intermittent intravenous doses of versed and fentanyl. The vital signs were monitored and recorded by an experienced RN. The procedure started after the patient was adequately sedated. The moderate intravenous conscious sedation was provided for  5 minutes.    The patient was brought to the procedure room and positioned prone on the procedure table.  After comprehensive monitors were applied, the lumbosacral area was prepped and draped sterilely.  After local anesthetic was instilled in the skin and subcutaneous tissue, a 22 gauge spinal needle was introduced and advanced under fluoroscopy to contact bone along the tip of the coccyx.  1 cc of Omnipaque was injected which was negative for vascular spread.  At this point, a combination of normal saline and Depo-Medrol 40 mg was  injected in a total volume of 10 ml. The needle was flushed with 1 mL 1% PF lidocaine.  The patient tolerated the procedure very well and was discharged to a responsible adult after discharge criteria were met.     Complications: None.    Follow up: Clinic        Ryan Mccarty MD

## 2024-03-15 ENCOUNTER — TELEPHONE (OUTPATIENT)
Dept: PAIN CLINIC | Facility: CLINIC | Age: 49
End: 2024-03-15

## 2024-03-15 NOTE — TELEPHONE ENCOUNTER
Faxed spine surgery request form to [facility] at [phone].    Confirmation received.

## 2024-03-15 NOTE — TELEPHONE ENCOUNTER
Nazanin called placed to patient for post procedure follow up. Patient stated she is doing well, no questions or concerns.  Pt verbalized understanding to call with any questions or concerns.      Procedure:   COCCYGEAL INJECTION with sedation       Date: 3/14/24  Follow up Visit Scheduled: Pt stated she will contact us to schedule

## 2024-03-17 ENCOUNTER — LABORATORY ENCOUNTER (OUTPATIENT)
Dept: LAB | Facility: HOSPITAL | Age: 49
End: 2024-03-17
Attending: NEUROLOGICAL SURGERY
Payer: COMMERCIAL

## 2024-03-17 DIAGNOSIS — Z01.818 PRE-OP TESTING: ICD-10-CM

## 2024-03-17 LAB
ANTIBODY SCREEN: NEGATIVE
BILIRUB UR QL STRIP.AUTO: NEGATIVE
CHLORIDE SERPL-SCNC: 109 MMOL/L (ref 98–112)
CO2 SERPL-SCNC: 30 MMOL/L (ref 21–32)
GLUCOSE UR STRIP.AUTO-MCNC: NORMAL MG/DL
KETONES UR STRIP.AUTO-MCNC: NEGATIVE MG/DL
LEUKOCYTE ESTERASE UR QL STRIP.AUTO: 500
NITRITE UR QL STRIP.AUTO: NEGATIVE
PH UR STRIP.AUTO: 5 [PH] (ref 5–8)
POTASSIUM SERPL-SCNC: 4.7 MMOL/L (ref 3.5–5.1)
PROT UR STRIP.AUTO-MCNC: NEGATIVE MG/DL
RBC #/AREA URNS AUTO: >10 /HPF
RBC UR QL AUTO: NEGATIVE
RH BLOOD TYPE: NEGATIVE
SODIUM SERPL-SCNC: 136 MMOL/L (ref 136–145)
SP GR UR STRIP.AUTO: 1.02 (ref 1–1.03)
UROBILINOGEN UR STRIP.AUTO-MCNC: NORMAL MG/DL
WBC #/AREA URNS AUTO: >50 /HPF

## 2024-03-17 PROCEDURE — 86850 RBC ANTIBODY SCREEN: CPT

## 2024-03-17 PROCEDURE — 81001 URINALYSIS AUTO W/SCOPE: CPT

## 2024-03-17 PROCEDURE — 86900 BLOOD TYPING SEROLOGIC ABO: CPT

## 2024-03-17 PROCEDURE — 36415 COLL VENOUS BLD VENIPUNCTURE: CPT

## 2024-03-17 PROCEDURE — 86901 BLOOD TYPING SEROLOGIC RH(D): CPT

## 2024-03-17 PROCEDURE — 80051 ELECTROLYTE PANEL: CPT

## 2024-03-17 PROCEDURE — 93010 ELECTROCARDIOGRAM REPORT: CPT | Performed by: INTERNAL MEDICINE

## 2024-03-17 PROCEDURE — 93005 ELECTROCARDIOGRAM TRACING: CPT

## 2024-03-18 LAB
ATRIAL RATE: 73 BPM
P AXIS: 61 DEGREES
P-R INTERVAL: 140 MS
Q-T INTERVAL: 388 MS
QRS DURATION: 74 MS
QTC CALCULATION (BEZET): 427 MS
R AXIS: 61 DEGREES
T AXIS: 69 DEGREES
VENTRICULAR RATE: 73 BPM

## 2024-03-18 RX ORDER — LEVOFLOXACIN 500 MG/1
500 TABLET, FILM COATED ORAL DAILY
Qty: 5 TABLET | Refills: 0 | Status: SHIPPED | OUTPATIENT
Start: 2024-03-18 | End: 2024-03-25 | Stop reason: ALTCHOICE

## 2024-03-18 NOTE — TELEPHONE ENCOUNTER
Per Dr. Beckford note in the patient preop lab orders.     \"I have ordered the patient Cipro for what appears like a possible contaminated urine collection.     Preferable to do treatment in the setting of a planned ADR.     MKK\"    Called the patient to inform  Pt acknowledged.   Educated pt on medication administration.  Educated pt to start medication as soon as it is available to patient.   Pt acknowledged and is appreciative   Call was ended.

## 2024-03-18 NOTE — PROGRESS NOTES
Hello    I have ordered the patient Cipro for what appears like a possible contaminated urine collection.    Preferable to do treatment in the setting of a planned ADR.    MKK

## 2024-03-21 ENCOUNTER — NURSE ONLY (OUTPATIENT)
Dept: SURGERY | Facility: CLINIC | Age: 49
End: 2024-03-21
Payer: COMMERCIAL

## 2024-03-21 DIAGNOSIS — Z71.9 ENCOUNTER FOR EDUCATION: Primary | ICD-10-CM

## 2024-03-21 NOTE — PROGRESS NOTES
RN Spine Navigator Education for [facility]     If you have received instructions from your surgeon that are different from those listed below, please follow your physician's instructions.    You are scheduled for a Arthroplasty with Dr. Beckford on 3/28.      Patient Surgical Goals: Decreased symptoms in neck and arms.     Before Your Surgery    Choose a Care Partner  Patient attended spine navigator visit independently.  Care partner is Edgard. Your care partner(s) should be able to provide transportation to and from the hospital. They should be able to help at home for the first week after discharge, including helping you with meals, medication, and dressing changes.    Clearance Before Surgery  You will need to see your primary care provider within 30 days before surgery. Please make sure this appointment is at least a week before surgery as more testing or doctor visits may be ordered. Presurgical testing may include labs, nasal swab, imaging, EKG.   Make sure that you complete all preadmission testing so that surgery does not get delayed.    Home Environment  Assessed home status: home has stairs, bedroom and bathroom are on first floor, patient has dependents at home, and patient has pets. Suggested arrangements for childcare  and pet care.  It is recommended that you prepare your home by putting clean sheets on your bed, freezing meals, and putting frequently used items at waist level.   Prevent falls by removing items that could cause you to trip, adding nightlights and adding a nonskid mat in shower.   Assistive devices can be purchased at a medical supply store or online including canes, walkers, toileting devices (commodes, raised toilet seats, toilet paper wiping aid), long handled sponge, shower chair or tub transfer bench, grabber/reacher tool, sock aid, long handled shoehorn, if needed.      Tobacco, Nicotine and Marijuana Use   Not applicable    Medications to Stop   For 7-10 days before surgery do not take  any blood thinning medications. This includes non-steroidal anti-inflammatories or NSAIDs (Advil, Aleve, Motrin, ibuprofen, naproxen, meloxicam, diclofenac, celecoxib, etc.), aspirin (unless told otherwise by your cardiologist or surgeon), herbal supplements and vitamins (garlic, turmeric, vitamin E, fish oil or krill oil, etc.). You may only take Tylenol or prescribed narcotic medication if needed for pain.   Other medications to stop include: appetite suppressants GLP-1 day 24 hours before for daily or a week for weekly    Leading Up to Day of Surgery  Five days before surgery wash with Hibiclens soap after your regular body soap every day. Do not put use Hibiclens on your face, hair or privates. Your last shower should be the night before surgery.  One-two business days before surgery, the preadmission testing staff will call you and let you know what time to arrive, where to park, when to take your Tylenol and Gatorade, and will provide any additional instructions.   After 11pm the day before surgery, do not eat or drink anything (including water, gum, or candies) except for Tylenol and Gatorade.   Drink 12 ounces of regular Gatorade (NOT RED) 12 hours prior to your scheduled surgery time. Drink your second 12 ounces of regular Gatorade and take 1000 mg of Tylenol (acetaminophen) 4 hours before your scheduled surgery time.     Items to Bring to the Hospital   Bring insurance card, ID, advanced directive, or medical power of  paperwork, loose fitting clothing, shoes with a back that can accommodate swollen feet, long phone charging cord, glasses.  Do not bring jewelry, valuables, or medications.   If you take an uncommon medication that the hospital may not have, it must be brought to the hospital in the original container, and you must notify the nurse of this medication.     In the Hospital     Operative Day and Hospital Stay  In the preoperative area, you will change into a gown, have an IV placed in  your hand or arm by the nurse, and sign any consent paperwork that is needed for your procedure. You will speak to the surgeon and anesthesiologist. It is important to tell the doctors and nurses if you have had any significant side effects from pain medications or anesthesia such as a rash or severe nausea.    In the operating room, the anesthesiologist will attach monitors, give you oxygen through a mask, and give you medicine through the IV to fall asleep. After you are sleeping, the breathing tube will be placed. The surgeon may use additional nerve monitoring during your surgery. This is to make sure that the muscles and nerves in your arms and legs are working normally as he operates. The equipment will be hooked up and removed while you are asleep. You will wake up on the hospital bed.     During the surgery, your care partner can sit in the surgical waiting area. There are TV screens in that area that keep them informed of your progress. If the procedure is at [facility], there is a person who can speak to them about your surgical progress.     In the recovery room, monitors will be attached that check your heart rate, blood pressure and oxygen levels. While you may not remember this part, a nurse is with you and constantly checking on your condition. Medications for pain and nausea will be given if you need them. You may have a east catheter to empty your bladder or a drain at your surgical site. Your family is not allowed in the recovery room. When you are ready to leave the recovery room, you will be transported on your bed to the inpatient unit accompanied by your family once a room is available.  On the inpatient unit, a team of doctors and advanced practice providers will manage your care. The spine care nurses will check your blood pressure, temperature, heartbeat, breathing, stomach sounds and your incision. They may assess the strength and sensation in your arms and legs. Medications that are given in  the hospital include antibiotics, IV fluids, pain medications, muscle relaxers, stool softeners, and your home medications. You may get blood drawn and another x-ray. Physical and occupational therapists may come to your room to teach you how to move around safely after surgery.     Post Op Plan   The average length of hospital stay is one to three days. A  may visit you to help arrange extra care for you once you leave the hospital. Occasionally, it is recommended that a home health nurse or therapist visit you in your home for a short time. The best place to recover is your own home, but if you need more assistance than home health and your care partner can provide, the  will help you and your family choose other facilities to help you recover your strength.    Preventing surgical complications  It is important to follow all instructions before and after surgery to decrease the risks of surgery and prevent complications.     Blood clots: walk, wear leg compression devices in the hospital, and do ankle pumps at home  Infection: wash with Hibiclens before surgery, wash your hands, don't touch your incision  Pneumonia: take deep breaths and cough and use the breathing exercise (incentive spirometer)   Nausea/vomiting: start with liquids and small meals and do not take pills on an empty stomach  Constipation: drink water and walk frequently    Tell your nurse if you are experiencing nausea, vomiting or constipation as they have medications to help treat these.      At home     Understanding Pain After Surgery  We will do our best to manage your pain after surgery, but it is not possible to be completely pain-free. There will be operative pain in your back or neck. Pain in the arms or legs may be one of the first things to improve. Numbness, weakness, and tingling should improve over time. However, there can be a temporary increase in symptoms in the first few days due to inflammation from  surgery.   Pain medications will be prescribed to take home at discharge. The goal of pain medicine after surgery is to make your pain tolerable, not to make you pain free. We encourage you to use the medication prescribed to you after your surgery, but please take the lowest possible dose to manage your pain. Taking high doses of narcotics can cause side effects. Transition to plain Tylenol when your pain improves. At [facility], your prescriptions can be filled by our pharmacy and brought to you bedside. You may get more continuous pain relief by alternating between medications if you have multiple instead of taking them at the same time. Write down when you have taken a medication as it may be difficult to remember after a few doses.    Post operative medication   Tylenol (acetaminophen): take for pain. Do not take more than 3000 mg - 4000 mg in 24 hours because it can damage your liver.   Narcotics: take for moderate to severe pain. Do not drink alcohol or drive while taking narcotics. Some narcotic medications (Norco, Tylenol #3, Percocet, Vicodin) contain Tylenol (acetaminophen). Make sure to not exceed the maximum dose if you are taking additional Tylenol with these medications.  Muscle relaxers: take for muscle cramping. These can cause drowsiness.    NSAIDS (Advil, Aleve, Motrin, ibuprofen, naproxen, meloxicam, diclofenac, celecoxib, etc.) or aspirin: Do not take these unless your physician says it is ok.  Stool softeners: take to prevent constipation while you are taking narcotic medications. You can get these over the counter at the pharmacy. You may use laxatives, which are stronger, if needed.    If you believe you will need more of medication your surgeon has prescribed to you, request a refill through your pharmacy or through the refill request in [employer] (log in, go to medications, then select refill request) at least two business days before you run out of medication.     Nonpharmacologic pain  management   You may use ice on your incision for 20 minutes every hour to help with pain and swelling. Do not place ice directly on your skin. You can use heat to sooth your muscles but avoid placing heat directly on your incision. Make frequent position changes. You can do gentle stretching while avoiding significant bending or twisting. Use deep breathing techniques and distractions such as TV, music, reading, or games.   Additional Recommendations for Anterior Cervical Surgery  Smoothies or protein shakes may be more comfortable to consume then solid food for the first week after surgery. To help decrease throat swelling, suck on ice chips and drink cold liquids. Cough drops can also be help decrease throat irritation.    Movement restrictions  After surgery, no bending or twisting your neck or back. Do not lift anything over 10lbs (about the weight of a gallon of milk) or lift anything over your shoulders. Avoid pulling or pushing. You may use stairs while holding the handrail.  It is ok to ride in the car but refrain from driving or traveling long distances until cleared to do so by your surgeon. You may not drive while taking narcotics or muscle relaxants. If you have cervical surgery, it may be several weeks before you can drive. , If you had a fracture or fusion surgery, your doctor may give you a brace. Braces are worn for comfort, when up and moving around, or constantly depending on your doctor's order. Wear your brace as instructed.     Post Op Exercise   Walk frequently. Start with walking short distances and increase as you start to feel better. Do ankle pumps (bending at your ankles, bring your feet towards your head then point them towards the ground) 15-20 times every hour when awake to help prevent blood clots.     Your surgeon will let you know at your post operative appointment if you are ready to decrease your movement restrictions and increase your exercise. If you have questions in between  appointments about lessening your restrictions, please contact the office.     You and your doctor will discuss how you are feeling as you heal and decide if outpatient physical therapy or a medical fitness referral is needed.    Caring for your incision  Always wash your hands before touching your incision. Your incision will be closed with sutures under the skin and skin glue or Steri-Strips (thin white bandages) on top of the skin. Do not attempt to peal off Skin glue/Steri-Strips as they will come off on their own. If the incision is closed with sutures or staples on top of the skin, they will be removed at a post op appointment. The incision may be covered with a gauze dressing that can come off after three days. Once the original gauze dressing is removed, we prefer that you leave your incision open to air (without a gauze dressing). If the incision has drainage or is rubbing against your clothes or brace, you may place gauze and medical tape over it. Change the gauze and medical tape daily. Look at your incision daily to check for signs of infection.   You can shower three days after your surgery or sooner if your surgeon allows. We recommend the care partner be present during the first shower for safety. Let soapy water run over the incision, but do not scrub it or spray it directly. Gently pat it dry after with a clean towel. Do not apply any creams or lotions to the incision. Do not soak in a tub, pool, or any body of water until your incision is fully healed.    Signs of Infection   Check your incision daily for swelling, redness, drainage, pus, bad smell, or opening of the incision.     When to Call for Assistance  Call the Neurosurgery Office ([phone] Option #2) if you experience signs of infection, opening of the incision, continuous nausea or vomiting, poor pain control despite using the pain medication as directed, a sudden increase in pain, temperature over 101F, difficulty swallowing, leg  swelling, or with any concerns, unanswered questions, or new problems, such as new numbness/weakness/tingling.  Call 911 or go to the nearest emergency room if you experience chest pain, difficulty breathing, loss of bowel or bladder control, extreme drowsiness, or any other life-threatening situation.     Follow-up Plan   Appointments with Dr. Beckford or Luana at 1, 4 and 12 weeks    Answered questions regarding: none     You can contact the RN Spine Navigator at [phone] or [email or web address] with additional questions or feedback on your care. It may take several business days to receive a reply so please do not call the RN spine navigator for refills or for emergencies.    Spine navigator spent 39 minutes conducting a virtual visit to provide education. Thank you for letting the RN Spine Navigator participate in your care.

## 2024-03-25 ENCOUNTER — OFFICE VISIT (OUTPATIENT)
Dept: SURGERY | Facility: CLINIC | Age: 49
End: 2024-03-25
Payer: COMMERCIAL

## 2024-03-25 VITALS — HEART RATE: 88 BPM | DIASTOLIC BLOOD PRESSURE: 62 MMHG | OXYGEN SATURATION: 99 % | SYSTOLIC BLOOD PRESSURE: 102 MMHG

## 2024-03-25 DIAGNOSIS — M54.12 CERVICAL RADICULOPATHY: Primary | ICD-10-CM

## 2024-03-25 PROCEDURE — 99215 OFFICE O/P EST HI 40 MIN: CPT | Performed by: NEUROLOGICAL SURGERY

## 2024-03-25 NOTE — PROGRESS NOTES
Critical access hospital  Neurological Surgery Clinic Note    Vita Gillis  12/30/1975  [id number]  PCP: Cary Bacon MD    REASON FOR VISIT:  RUE radiculopathy    HISTORY OF PRESENT ILLNESS:  Vita Gillis is a(n) 48 year old female with progressive RUE radiculopathy. Patient complains of subscapular pain as well.  She has no weakness.  She denies any gait instability.     MRI shows disc disease at C6-7 on R with foraminal stenosis      Location: neck pain  Quality: numbness in arm  Severity: progressive  Duration: months  Timing: any  Context: MRI shows disc at C6-7 on R  Modifying Factors: none   Associated signs/symptoms: pain      PAST MEDICAL HISTORY:  Past Medical History:   Diagnosis Date    Abdominal distention     Abdominal pain     Allergic rhinitis     Anxiety state, unspecified     Back pain     Back problem     Bad breath     Belching     Bloating     Blood in the stool     Constipation     Depression     Dysmenorrhea     Esophageal reflux     Frequent use of laxatives     Heartburn     Hemorrhoids     High cholesterol     History of depression     History of stomach ulcers     Hx of motion sickness     Indigestion     Irregular bowel habits     Loss of appetite     Migraine     Migraines     Nausea     Obesity     Other and unspecified hyperlipidemia     Pain with bowel movements     Problems with swallowing     history of    Sleep disturbance     Stress     Uncomfortable fullness after meals     Weight gain     Weight loss        PAST SURGICAL HISTORY:  Past Surgical History:   Procedure Laterality Date    BUNIONECTOMY Bilateral     COLONOSCOPY      COLPOSCOPY, CERVIX W/UPPER ADJACENT VAGINA; W/BIOPSY(S), CERVIX  2008    HPV+    FRACTURE SURGERY      repair of nasal fracture    GASTRO - [facility]  08/2016    healed ulcers    OTHER      wisdom teeth extraction    OTHER SURGICAL HISTORY      nose repair 2014    UP GI ENDOSCOPY,BALL DIL,30MM N/A 08/09/2016    Procedure:  ESOPHAGOGASTRODUODENOSCOPY, POSSIBLE BIOPSY, POSSIBLE POLYPECTOMY 95110;  Surgeon: Sukh Samayoa MD;  Location: [facility]    UPPER GI ENDO NO BARRETTS  05/2016    esophageal ulcer; HH    UPPER GI ENDOSCOPY,BIOPSY N/A 05/17/2016    Procedure: ESOPHAGOGASTRODUODENOSCOPY, POSSIBLE BIOPSY, POSSIBLE POLYPECTOMY 60697;  Surgeon: Sukh Samayoa MD;  Location: [facility]       FAMILY HISTORY:  family history includes Alcohol and Other Disorders Associated in her father; Anxiety in her maternal grandmother; Breast Cancer (age of onset: 52) in her maternal grandmother; Breast Cancer (age of onset: 62) in her maternal aunt; Cancer in her maternal grandfather and maternal grandmother; Crohn's Disease in her maternal aunt; Depression in her paternal grandmother, son, and another family member; Diabetes in her maternal grandfather, maternal grandmother, paternal grandfather, and paternal grandmother; Heart Attack in her maternal grandfather; Homicide History in her father; Lipids in her maternal grandfather; Other in her son; Psychiatric in her sister; Stroke in her maternal grandfather and maternal grandmother; Suicide History in an other family member.    SOCIAL HISTORY:   reports that she quit smoking about 16 years ago. Her smoking use included cigarettes. She has a 18 pack-year smoking history. She has never used smokeless tobacco. She reports that she does not currently use alcohol. She reports that she does not use drugs.    ALLERGIES:  No Known Allergies    MEDICATIONS:  Current Outpatient Medications on File Prior to Visit   Medication Sig Dispense Refill    semaglutide 2 MG/1.5ML Subcutaneous Solution Pen-injector Inject 0.25 mg into the skin once a week. For weightloss  Sundays      lurasidone 20 MG Oral Tab Take 1 tablet (20 mg total) by mouth daily with food. 30 tablet 2    desvenlafaxine  MG Oral Tablet 24 Hr Take 1 tablet (100 mg total) by mouth daily. 30 tablet 2    clobetasol 0.05 %  External Solution APPLY TO THE AFFECTED AREA OF SCALP TWICE DAILY TO ITCHY AREAS      hydrocortisone 2.5 % External Cream       ketoconazole 2 % External Cream       ketoconazole 2 % External Shampoo       Norethindrone (ORTHO MICRONOR) 0.35 MG Oral Tab Take 1 tablet (0.35 mg total) by mouth daily. 84 tablet 3    lamoTRIgine 150 MG Oral Tab Take 1 tablet (150 mg total) by mouth 2 (two) times daily. 180 tablet 0    CLONIDINE 0.1 MG Oral Tab TAKE 1 TABLET(0.1 MG) BY MOUTH TWICE DAILY 180 tablet 0    SUMAtriptan Succinate 100 MG Oral Tab TAKE 1 TABLET BY MOUTH WITHIN 30 MINUTES OF HEADACHE ONSET 9 tablet 0    OMEPRAZOLE 20 MG Oral Capsule Delayed Release TAKE 1 CAPSULE(20 MG) BY MOUTH TWICE DAILY BEFORE MEALS (Patient taking differently: Take 1 capsule (20 mg total) by mouth 2 (two) times daily as needed.) 180 capsule 2     No current facility-administered medications on file prior to visit.       REVIEW OF SYSTEMS:  Review of Systems   All other systems reviewed and are negative.       PHYSICAL EXAMINATION:  Neurologic Exam     Mental Status   Oriented to person, place, and time.     Cranial Nerves   Cranial nerves II through XII intact.     Motor Exam   Muscle bulk: normal  Overall muscle tone: normal  Right arm tone: normal  Left arm tone: normal  Right arm pronator drift: absent  Left arm pronator drift: absent  Right leg tone: normal  Left leg tone: normal    Strength   Strength 5/5 throughout.     Sensory Exam   Light touch normal.   Vibration normal.   Proprioception normal.   Pinprick normal.     Gait, Coordination, and Reflexes     Gait  Gait: normal    Reflexes   Right brachioradialis: 2+  Left brachioradialis: 2+  Right biceps: 2+  Left biceps: 2+  Right triceps: 2+  Left triceps: 2+  Right patellar: 2+  Left patellar: 2+  Right achilles: 2+  Left achilles: 2+  Right : 2+  Left : 2+       IMAGING:  As above    ASSESSMENT:  RUE radiculopathy    Plan:  Plan for C6-7 ADR-risks and benefits discussed at  hebert Beckford, DO  Neurological Surgery  [facility]

## 2024-03-25 NOTE — PATIENT INSTRUCTIONS
Refill policies:    Allow 2-3 business days for refills; controlled substances may take longer.  Contact your pharmacy at least 5 days prior to running out of medication and have them send an electronic request or submit request through the “request refill” option in your [employer] account.  Refills are not addressed on weekends; covering physicians do not authorize routine medications on weekends.  No narcotics or controlled substances are refilled after noon on Fridays or by on call physicians.  By law, narcotics must be electronically prescribed.  A 30 day supply with no refills is the maximum allowed.  If your prescription is due for a refill, you may be due for a follow up appointment.  To best provide you care, patients receiving routine medications need to be seen at least once a year.  Patients receiving narcotic/controlled substance medications need to be seen at least once every 3 months.  In the event that your preferred pharmacy does not have the requested medication in stock (e.g. Backordered), it is your responsibility to find another pharmacy that has the requested medication available.  We will gladly send a new prescription to that pharmacy at your request.    Scheduling Tests:    If your physician has ordered radiology tests such as MRI or CT scans, please contact Central Scheduling at [phone] right away to schedule the test.  Once scheduled, the [facility] Centralized Referral Team will work with your insurance carrier to obtain pre-certification or prior authorization.  Depending on your insurance carrier, approval may take 3-10 days.  It is highly recommended patients assure they have received an authorization before having a test performed.  If test is done without insurance authorization, patient may be responsible for the entire amount billed.      Precertification and Prior Authorizations:  If your physician has recommended that you have a procedure or additional testing performed the [facility]  Centralized Referral Team will contact your insurance carrier to obtain pre-certification or prior authorization.    You are strongly encouraged to contact your insurance carrier to verify that your procedure/test has been approved and is a COVERED benefit.  Although the [facility] Centralized Referral Team does its due diligence, the insurance carrier gives the disclaimer that \"Although the procedure is authorized, this does not guarantee payment.\"    Ultimately the patient is responsible for payment.   Thank you for your understanding in this matter.  Paperwork Completion:  If you require FMLA or disability paperwork for your recovery, please make sure to either drop it off or have it faxed to our office at [phone]. Be sure the form has your name and date of birth on it.  The form will be faxed to our Forms Department and they will complete it for you.  There is a 25$ fee for all forms that need to be filled out.  Please be aware there is a 10-14 day turnaround time.  You will need to sign a release of information (NGA) form if your paperwork does not come with one.  You may call the Forms Department with any questions at [phone].  Their fax number is [phone].

## 2024-03-25 NOTE — PROGRESS NOTES
Patient is here today for Pre surgical visit -- 3/28/24-- Sx:  Right anterior cervical discectomy at cervical 6-cervical 7 with placement of arthroplasty device    Patient completed oral course of abx -- Levaquin as prescribed

## 2024-03-25 NOTE — H&P (VIEW-ONLY)
Formerly [facility], later [facility]  Neurological Surgery Clinic Note    Vita Gillis  12/30/1975  [id number]  PCP: Cary Bacon MD    REASON FOR VISIT:  RUE radiculopathy    HISTORY OF PRESENT ILLNESS:  Vita Gillis is a(n) 48 year old female with progressive RUE radiculopathy. Patient complains of subscapular pain as well.  She has no weakness.  She denies any gait instability.     MRI shows disc disease at C6-7 on R with foraminal stenosis      Location: neck pain  Quality: numbness in arm  Severity: progressive  Duration: months  Timing: any  Context: MRI shows disc at C6-7 on R  Modifying Factors: none   Associated signs/symptoms: pain      PAST MEDICAL HISTORY:  Past Medical History:   Diagnosis Date    Abdominal distention     Abdominal pain     Allergic rhinitis     Anxiety state, unspecified     Back pain     Back problem     Bad breath     Belching     Bloating     Blood in the stool     Constipation     Depression     Dysmenorrhea     Esophageal reflux     Frequent use of laxatives     Heartburn     Hemorrhoids     High cholesterol     History of depression     History of stomach ulcers     Hx of motion sickness     Indigestion     Irregular bowel habits     Loss of appetite     Migraine     Migraines     Nausea     Obesity     Other and unspecified hyperlipidemia     Pain with bowel movements     Problems with swallowing     history of    Sleep disturbance     Stress     Uncomfortable fullness after meals     Weight gain     Weight loss        PAST SURGICAL HISTORY:  Past Surgical History:   Procedure Laterality Date    BUNIONECTOMY Bilateral     COLONOSCOPY      COLPOSCOPY, CERVIX W/UPPER ADJACENT VAGINA; W/BIOPSY(S), CERVIX  2008    HPV+    FRACTURE SURGERY      repair of nasal fracture    GASTRO - DMG  08/2016    healed ulcers    OTHER      wisdom teeth extraction    OTHER SURGICAL HISTORY      nose repair 2014    UP GI ENDOSCOPY,BALL DIL,30MM N/A 08/09/2016    Procedure:  ESOPHAGOGASTRODUODENOSCOPY, POSSIBLE BIOPSY, POSSIBLE POLYPECTOMY 55909;  Surgeon: Sukh Samayoa MD;  Location: [facility]    UPPER GI ENDO NO BARRETTS  05/2016    esophageal ulcer; HH    UPPER GI ENDOSCOPY,BIOPSY N/A 05/17/2016    Procedure: ESOPHAGOGASTRODUODENOSCOPY, POSSIBLE BIOPSY, POSSIBLE POLYPECTOMY 19078;  Surgeon: Sukh Samayoa MD;  Location: [facility]       FAMILY HISTORY:  family history includes Alcohol and Other Disorders Associated in her father; Anxiety in her maternal grandmother; Breast Cancer (age of onset: 52) in her maternal grandmother; Breast Cancer (age of onset: 62) in her maternal aunt; Cancer in her maternal grandfather and maternal grandmother; Crohn's Disease in her maternal aunt; Depression in her paternal grandmother, son, and another family member; Diabetes in her maternal grandfather, maternal grandmother, paternal grandfather, and paternal grandmother; Heart Attack in her maternal grandfather; Homicide History in her father; Lipids in her maternal grandfather; Other in her son; Psychiatric in her sister; Stroke in her maternal grandfather and maternal grandmother; Suicide History in an other family member.    SOCIAL HISTORY:   reports that she quit smoking about 16 years ago. Her smoking use included cigarettes. She has a 18 pack-year smoking history. She has never used smokeless tobacco. She reports that she does not currently use alcohol. She reports that she does not use drugs.    ALLERGIES:  No Known Allergies    MEDICATIONS:  Current Outpatient Medications on File Prior to Visit   Medication Sig Dispense Refill    semaglutide 2 MG/1.5ML Subcutaneous Solution Pen-injector Inject 0.25 mg into the skin once a week. For weightloss  Sundays      lurasidone 20 MG Oral Tab Take 1 tablet (20 mg total) by mouth daily with food. 30 tablet 2    desvenlafaxine  MG Oral Tablet 24 Hr Take 1 tablet (100 mg total) by mouth daily. 30 tablet 2    clobetasol 0.05 %  External Solution APPLY TO THE AFFECTED AREA OF SCALP TWICE DAILY TO ITCHY AREAS      hydrocortisone 2.5 % External Cream       ketoconazole 2 % External Cream       ketoconazole 2 % External Shampoo       Norethindrone (ORTHO MICRONOR) 0.35 MG Oral Tab Take 1 tablet (0.35 mg total) by mouth daily. 84 tablet 3    lamoTRIgine 150 MG Oral Tab Take 1 tablet (150 mg total) by mouth 2 (two) times daily. 180 tablet 0    CLONIDINE 0.1 MG Oral Tab TAKE 1 TABLET(0.1 MG) BY MOUTH TWICE DAILY 180 tablet 0    SUMAtriptan Succinate 100 MG Oral Tab TAKE 1 TABLET BY MOUTH WITHIN 30 MINUTES OF HEADACHE ONSET 9 tablet 0    OMEPRAZOLE 20 MG Oral Capsule Delayed Release TAKE 1 CAPSULE(20 MG) BY MOUTH TWICE DAILY BEFORE MEALS (Patient taking differently: Take 1 capsule (20 mg total) by mouth 2 (two) times daily as needed.) 180 capsule 2     No current facility-administered medications on file prior to visit.       REVIEW OF SYSTEMS:  Review of Systems   All other systems reviewed and are negative.       PHYSICAL EXAMINATION:  Neurologic Exam     Mental Status   Oriented to person, place, and time.     Cranial Nerves   Cranial nerves II through XII intact.     Motor Exam   Muscle bulk: normal  Overall muscle tone: normal  Right arm tone: normal  Left arm tone: normal  Right arm pronator drift: absent  Left arm pronator drift: absent  Right leg tone: normal  Left leg tone: normal    Strength   Strength 5/5 throughout.     Sensory Exam   Light touch normal.   Vibration normal.   Proprioception normal.   Pinprick normal.     Gait, Coordination, and Reflexes     Gait  Gait: normal    Reflexes   Right brachioradialis: 2+  Left brachioradialis: 2+  Right biceps: 2+  Left biceps: 2+  Right triceps: 2+  Left triceps: 2+  Right patellar: 2+  Left patellar: 2+  Right achilles: 2+  Left achilles: 2+  Right : 2+  Left : 2+       IMAGING:  As above    ASSESSMENT:  RUE radiculopathy    Plan:  Plan for C6-7 ADR-risks and benefits discussed at  hebert Beckford, DO  Neurological Surgery  [facility]

## 2024-03-26 NOTE — TELEPHONE ENCOUNTER
Prior authorization request completed for: anterior cervical discectomy at cervical 6-7 with placement of arthroplasty device    Authorization #NO PRECERT REQUIRED   Authorization dates: 03/2824  CPT codes approved: 19575,36652  Number of visits/dates of service approved: Outpatient   Physician: Analy   Location: [facility] Ref#: [id number]  Representative Name: Max C   Insurance Carrier: [facility]    Per [facility] ok for patient to have surgery done on 03/28 since pre-D is just a recommendation.

## 2024-03-27 ENCOUNTER — ANESTHESIA EVENT (OUTPATIENT)
Dept: SURGERY | Facility: HOSPITAL | Age: 49
End: 2024-03-27
Payer: COMMERCIAL

## 2024-03-28 ENCOUNTER — APPOINTMENT (OUTPATIENT)
Dept: GENERAL RADIOLOGY | Facility: HOSPITAL | Age: 49
End: 2024-03-28
Attending: NEUROLOGICAL SURGERY
Payer: COMMERCIAL

## 2024-03-28 ENCOUNTER — HOSPITAL ENCOUNTER (OUTPATIENT)
Facility: HOSPITAL | Age: 49
Discharge: HOME | End: 2024-03-29
Attending: NEUROLOGICAL SURGERY | Admitting: NEUROLOGICAL SURGERY
Payer: COMMERCIAL

## 2024-03-28 ENCOUNTER — ANESTHESIA (OUTPATIENT)
Dept: SURGERY | Facility: HOSPITAL | Age: 49
End: 2024-03-28
Payer: COMMERCIAL

## 2024-03-28 DIAGNOSIS — Z01.818 PRE-OP TESTING: ICD-10-CM

## 2024-03-28 DIAGNOSIS — Z98.1 S/P CERVICAL SPINAL FUSION: Primary | ICD-10-CM

## 2024-03-28 PROBLEM — M54.12 CERVICAL RADICULOPATHY: Status: ACTIVE | Noted: 2024-03-28

## 2024-03-28 PROBLEM — F32.A DEPRESSION: Status: ACTIVE | Noted: 2021-02-24

## 2024-03-28 PROBLEM — F41.9 ANXIETY: Status: ACTIVE | Noted: 2024-03-28

## 2024-03-28 LAB — B-HCG UR QL: NEGATIVE

## 2024-03-28 PROCEDURE — 76000 FLUOROSCOPY <1 HR PHYS/QHP: CPT | Performed by: NEUROLOGICAL SURGERY

## 2024-03-28 PROCEDURE — 99215 OFFICE O/P EST HI 40 MIN: CPT | Performed by: HOSPITALIST

## 2024-03-28 PROCEDURE — 0RR30JZ REPLACEMENT OF CERVICAL VERTEBRAL DISC WITH SYNTHETIC SUBSTITUTE, OPEN APPROACH: ICD-10-PCS | Performed by: NEUROLOGICAL SURGERY

## 2024-03-28 DEVICE — TOTAL CERVICAL DISC REPLACEMENT SYSTEM: MOBI-C CERVICAL DISC PROSTHESIS
Type: IMPLANTABLE DEVICE | Site: NECK | Status: FUNCTIONAL
Brand: MOBI-C® PLUG & FIT US

## 2024-03-28 RX ORDER — METHOCARBAMOL 100 MG/ML
INJECTION, SOLUTION INTRAMUSCULAR; INTRAVENOUS
Status: COMPLETED
Start: 2024-03-28 | End: 2024-03-28

## 2024-03-28 RX ORDER — HYDROMORPHONE HYDROCHLORIDE 1 MG/ML
0.4 INJECTION, SOLUTION INTRAMUSCULAR; INTRAVENOUS; SUBCUTANEOUS EVERY 5 MIN PRN
Status: DISCONTINUED | OUTPATIENT
Start: 2024-03-28 | End: 2024-03-28 | Stop reason: HOSPADM

## 2024-03-28 RX ORDER — PROCHLORPERAZINE EDISYLATE 5 MG/ML
5 INJECTION INTRAMUSCULAR; INTRAVENOUS EVERY 8 HOURS PRN
Status: DISCONTINUED | OUTPATIENT
Start: 2024-03-28 | End: 2024-03-29

## 2024-03-28 RX ORDER — CEFAZOLIN SODIUM/WATER 2 G/20 ML
2 SYRINGE (ML) INTRAVENOUS ONCE
Status: COMPLETED | OUTPATIENT
Start: 2024-03-28 | End: 2024-03-28

## 2024-03-28 RX ORDER — BISACODYL 10 MG
10 SUPPOSITORY, RECTAL RECTAL
Status: DISCONTINUED | OUTPATIENT
Start: 2024-03-28 | End: 2024-03-29

## 2024-03-28 RX ORDER — ROCURONIUM BROMIDE 10 MG/ML
INJECTION, SOLUTION INTRAVENOUS AS NEEDED
Status: DISCONTINUED | OUTPATIENT
Start: 2024-03-28 | End: 2024-03-28 | Stop reason: SURG

## 2024-03-28 RX ORDER — PANTOPRAZOLE SODIUM 20 MG/1
20 TABLET, DELAYED RELEASE ORAL
Status: DISCONTINUED | OUTPATIENT
Start: 2024-03-29 | End: 2024-03-29

## 2024-03-28 RX ORDER — CEFAZOLIN SODIUM/WATER 2 G/20 ML
2 SYRINGE (ML) INTRAVENOUS EVERY 8 HOURS
Qty: 40 ML | Refills: 0 | Status: COMPLETED | OUTPATIENT
Start: 2024-03-28 | End: 2024-03-29

## 2024-03-28 RX ORDER — SODIUM CHLORIDE, SODIUM LACTATE, POTASSIUM CHLORIDE, CALCIUM CHLORIDE 600; 310; 30; 20 MG/100ML; MG/100ML; MG/100ML; MG/100ML
INJECTION, SOLUTION INTRAVENOUS CONTINUOUS
Status: DISCONTINUED | OUTPATIENT
Start: 2024-03-28 | End: 2024-03-29

## 2024-03-28 RX ORDER — HYDROMORPHONE HYDROCHLORIDE 1 MG/ML
0.6 INJECTION, SOLUTION INTRAMUSCULAR; INTRAVENOUS; SUBCUTANEOUS EVERY 5 MIN PRN
Status: DISCONTINUED | OUTPATIENT
Start: 2024-03-28 | End: 2024-03-28 | Stop reason: HOSPADM

## 2024-03-28 RX ORDER — ONDANSETRON 2 MG/ML
4 INJECTION INTRAMUSCULAR; INTRAVENOUS EVERY 6 HOURS PRN
Status: DISCONTINUED | OUTPATIENT
Start: 2024-03-28 | End: 2024-03-29

## 2024-03-28 RX ORDER — HYDROCODONE BITARTRATE AND ACETAMINOPHEN 5; 325 MG/1; MG/1
1 TABLET ORAL EVERY 4 HOURS PRN
Status: DISCONTINUED | OUTPATIENT
Start: 2024-03-28 | End: 2024-03-29

## 2024-03-28 RX ORDER — SODIUM CHLORIDE, SODIUM LACTATE, POTASSIUM CHLORIDE, CALCIUM CHLORIDE 600; 310; 30; 20 MG/100ML; MG/100ML; MG/100ML; MG/100ML
INJECTION, SOLUTION INTRAVENOUS CONTINUOUS
Status: DISCONTINUED | OUTPATIENT
Start: 2024-03-28 | End: 2024-03-28 | Stop reason: HOSPADM

## 2024-03-28 RX ORDER — ONDANSETRON 2 MG/ML
4 INJECTION INTRAMUSCULAR; INTRAVENOUS EVERY 6 HOURS PRN
Status: DISCONTINUED | OUTPATIENT
Start: 2024-03-28 | End: 2024-03-28 | Stop reason: HOSPADM

## 2024-03-28 RX ORDER — SODIUM CHLORIDE 9 MG/ML
INJECTION, SOLUTION INTRAVENOUS CONTINUOUS
Status: DISCONTINUED | OUTPATIENT
Start: 2024-03-28 | End: 2024-03-29

## 2024-03-28 RX ORDER — ACETAMINOPHEN AND CODEINE PHOSPHATE 120; 12 MG/5ML; MG/5ML
0.35 SOLUTION ORAL DAILY
Status: DISCONTINUED | OUTPATIENT
Start: 2024-03-29 | End: 2024-03-29

## 2024-03-28 RX ORDER — MIDAZOLAM HYDROCHLORIDE 1 MG/ML
1 INJECTION INTRAMUSCULAR; INTRAVENOUS EVERY 5 MIN PRN
Status: DISCONTINUED | OUTPATIENT
Start: 2024-03-28 | End: 2024-03-28 | Stop reason: HOSPADM

## 2024-03-28 RX ORDER — DESVENLAFAXINE 100 MG/1
100 TABLET, EXTENDED RELEASE ORAL DAILY
Status: DISCONTINUED | OUTPATIENT
Start: 2024-03-29 | End: 2024-03-29

## 2024-03-28 RX ORDER — DIAZEPAM 5 MG/1
5 TABLET ORAL EVERY 6 HOURS PRN
Status: DISCONTINUED | OUTPATIENT
Start: 2024-03-28 | End: 2024-03-29

## 2024-03-28 RX ORDER — SENNOSIDES 8.6 MG
17.2 TABLET ORAL NIGHTLY
Status: DISCONTINUED | OUTPATIENT
Start: 2024-03-28 | End: 2024-03-29

## 2024-03-28 RX ORDER — METHOCARBAMOL 750 MG/1
750 TABLET, FILM COATED ORAL EVERY 6 HOURS PRN
Status: DISCONTINUED | OUTPATIENT
Start: 2024-03-28 | End: 2024-03-29

## 2024-03-28 RX ORDER — DEXAMETHASONE SODIUM PHOSPHATE 4 MG/ML
VIAL (ML) INJECTION AS NEEDED
Status: DISCONTINUED | OUTPATIENT
Start: 2024-03-28 | End: 2024-03-28 | Stop reason: SURG

## 2024-03-28 RX ORDER — PROCHLORPERAZINE EDISYLATE 5 MG/ML
5 INJECTION INTRAMUSCULAR; INTRAVENOUS EVERY 8 HOURS PRN
Status: DISCONTINUED | OUTPATIENT
Start: 2024-03-28 | End: 2024-03-28 | Stop reason: HOSPADM

## 2024-03-28 RX ORDER — HYDROMORPHONE HYDROCHLORIDE 1 MG/ML
0.2 INJECTION, SOLUTION INTRAMUSCULAR; INTRAVENOUS; SUBCUTANEOUS EVERY 5 MIN PRN
Status: DISCONTINUED | OUTPATIENT
Start: 2024-03-28 | End: 2024-03-28 | Stop reason: HOSPADM

## 2024-03-28 RX ORDER — KETOROLAC TROMETHAMINE 30 MG/ML
INJECTION, SOLUTION INTRAMUSCULAR; INTRAVENOUS AS NEEDED
Status: DISCONTINUED | OUTPATIENT
Start: 2024-03-28 | End: 2024-03-28 | Stop reason: SURG

## 2024-03-28 RX ORDER — ENEMA 19; 7 G/133ML; G/133ML
1 ENEMA RECTAL ONCE AS NEEDED
Status: DISCONTINUED | OUTPATIENT
Start: 2024-03-28 | End: 2024-03-29

## 2024-03-28 RX ORDER — DIPHENHYDRAMINE HYDROCHLORIDE 50 MG/ML
25 INJECTION INTRAMUSCULAR; INTRAVENOUS EVERY 4 HOURS PRN
Status: DISCONTINUED | OUTPATIENT
Start: 2024-03-28 | End: 2024-03-29

## 2024-03-28 RX ORDER — ONDANSETRON 2 MG/ML
INJECTION INTRAMUSCULAR; INTRAVENOUS AS NEEDED
Status: DISCONTINUED | OUTPATIENT
Start: 2024-03-28 | End: 2024-03-28 | Stop reason: SURG

## 2024-03-28 RX ORDER — HYDROCODONE BITARTRATE AND ACETAMINOPHEN 5; 325 MG/1; MG/1
1 TABLET ORAL ONCE AS NEEDED
Status: DISCONTINUED | OUTPATIENT
Start: 2024-03-28 | End: 2024-03-28 | Stop reason: HOSPADM

## 2024-03-28 RX ORDER — LIDOCAINE HYDROCHLORIDE 10 MG/ML
INJECTION, SOLUTION EPIDURAL; INFILTRATION; INTRACAUDAL; PERINEURAL AS NEEDED
Status: DISCONTINUED | OUTPATIENT
Start: 2024-03-28 | End: 2024-03-28 | Stop reason: SURG

## 2024-03-28 RX ORDER — LAMOTRIGINE 150 MG/1
150 TABLET ORAL 2 TIMES DAILY
Status: DISCONTINUED | OUTPATIENT
Start: 2024-03-28 | End: 2024-03-28

## 2024-03-28 RX ORDER — CLONIDINE HYDROCHLORIDE 0.1 MG/1
0.1 TABLET ORAL 2 TIMES DAILY
Status: DISCONTINUED | OUTPATIENT
Start: 2024-03-28 | End: 2024-03-29

## 2024-03-28 RX ORDER — LURASIDONE HYDROCHLORIDE 20 MG/1
20 TABLET, FILM COATED ORAL NIGHTLY
Status: DISCONTINUED | OUTPATIENT
Start: 2024-03-28 | End: 2024-03-29

## 2024-03-28 RX ORDER — NALOXONE HYDROCHLORIDE 0.4 MG/ML
0.08 INJECTION, SOLUTION INTRAMUSCULAR; INTRAVENOUS; SUBCUTANEOUS AS NEEDED
Status: DISCONTINUED | OUTPATIENT
Start: 2024-03-28 | End: 2024-03-28 | Stop reason: HOSPADM

## 2024-03-28 RX ORDER — HYDROCODONE BITARTRATE AND ACETAMINOPHEN 5; 325 MG/1; MG/1
2 TABLET ORAL ONCE AS NEEDED
Status: DISCONTINUED | OUTPATIENT
Start: 2024-03-28 | End: 2024-03-28 | Stop reason: HOSPADM

## 2024-03-28 RX ORDER — KETAMINE HYDROCHLORIDE 50 MG/ML
INJECTION, SOLUTION INTRAMUSCULAR; INTRAVENOUS AS NEEDED
Status: DISCONTINUED | OUTPATIENT
Start: 2024-03-28 | End: 2024-03-28 | Stop reason: SURG

## 2024-03-28 RX ORDER — METHOCARBAMOL 100 MG/ML
750 INJECTION, SOLUTION INTRAMUSCULAR; INTRAVENOUS ONCE
Status: COMPLETED | OUTPATIENT
Start: 2024-03-28 | End: 2024-03-28

## 2024-03-28 RX ORDER — POLYETHYLENE GLYCOL 3350 17 G/17G
17 POWDER, FOR SOLUTION ORAL DAILY PRN
Status: DISCONTINUED | OUTPATIENT
Start: 2024-03-28 | End: 2024-03-29

## 2024-03-28 RX ORDER — LURASIDONE HYDROCHLORIDE 20 MG/1
20 TABLET, FILM COATED ORAL
Status: DISCONTINUED | OUTPATIENT
Start: 2024-03-29 | End: 2024-03-28

## 2024-03-28 RX ORDER — ACETAMINOPHEN 10 MG/ML
INJECTION, SOLUTION INTRAVENOUS AS NEEDED
Status: DISCONTINUED | OUTPATIENT
Start: 2024-03-28 | End: 2024-03-28 | Stop reason: SURG

## 2024-03-28 RX ORDER — NEOSTIGMINE METHYLSULFATE 1 MG/ML
INJECTION, SOLUTION INTRAVENOUS AS NEEDED
Status: DISCONTINUED | OUTPATIENT
Start: 2024-03-28 | End: 2024-03-28 | Stop reason: SURG

## 2024-03-28 RX ORDER — OMEPRAZOLE 20 MG/1
20 CAPSULE, DELAYED RELEASE ORAL 2 TIMES DAILY PRN
COMMUNITY

## 2024-03-28 RX ORDER — ACETAMINOPHEN 500 MG
1000 TABLET ORAL ONCE AS NEEDED
Status: DISCONTINUED | OUTPATIENT
Start: 2024-03-28 | End: 2024-03-28 | Stop reason: HOSPADM

## 2024-03-28 RX ORDER — KETOROLAC TROMETHAMINE 30 MG/ML
30 INJECTION, SOLUTION INTRAMUSCULAR; INTRAVENOUS EVERY 6 HOURS
Status: DISCONTINUED | OUTPATIENT
Start: 2024-03-28 | End: 2024-03-29

## 2024-03-28 RX ORDER — ACETAMINOPHEN 500 MG
1000 TABLET ORAL ONCE
Status: DISCONTINUED | OUTPATIENT
Start: 2024-03-28 | End: 2024-03-28 | Stop reason: HOSPADM

## 2024-03-28 RX ORDER — LAMOTRIGINE 150 MG/1
150 TABLET ORAL 2 TIMES DAILY
Status: DISCONTINUED | OUTPATIENT
Start: 2024-03-28 | End: 2024-03-29

## 2024-03-28 RX ORDER — HYDROCODONE BITARTRATE AND ACETAMINOPHEN 5; 325 MG/1; MG/1
2 TABLET ORAL EVERY 4 HOURS PRN
Status: DISCONTINUED | OUTPATIENT
Start: 2024-03-28 | End: 2024-03-29

## 2024-03-28 RX ORDER — MIDAZOLAM HYDROCHLORIDE 1 MG/ML
INJECTION INTRAMUSCULAR; INTRAVENOUS AS NEEDED
Status: DISCONTINUED | OUTPATIENT
Start: 2024-03-28 | End: 2024-03-28 | Stop reason: SURG

## 2024-03-28 RX ORDER — DIPHENHYDRAMINE HCL 25 MG
25 CAPSULE ORAL EVERY 4 HOURS PRN
Status: DISCONTINUED | OUTPATIENT
Start: 2024-03-28 | End: 2024-03-29

## 2024-03-28 RX ORDER — GLYCOPYRROLATE 0.2 MG/ML
INJECTION, SOLUTION INTRAMUSCULAR; INTRAVENOUS AS NEEDED
Status: DISCONTINUED | OUTPATIENT
Start: 2024-03-28 | End: 2024-03-28 | Stop reason: SURG

## 2024-03-28 RX ORDER — LIDOCAINE HCL/EPINEPHRINE/PF 2%-1:200K
VIAL (ML) INJECTION AS NEEDED
Status: DISCONTINUED | OUTPATIENT
Start: 2024-03-28 | End: 2024-03-28

## 2024-03-28 RX ORDER — DEXAMETHASONE SODIUM PHOSPHATE 4 MG/ML
4 VIAL (ML) INJECTION EVERY 6 HOURS
Status: DISPENSED | OUTPATIENT
Start: 2024-03-28 | End: 2024-03-29

## 2024-03-28 RX ORDER — DOCUSATE SODIUM 100 MG/1
100 CAPSULE, LIQUID FILLED ORAL 2 TIMES DAILY
Status: DISCONTINUED | OUTPATIENT
Start: 2024-03-28 | End: 2024-03-29

## 2024-03-28 RX ADMIN — ROCURONIUM BROMIDE 20 MG: 10 INJECTION, SOLUTION INTRAVENOUS at 08:44:00

## 2024-03-28 RX ADMIN — DEXAMETHASONE SODIUM PHOSPHATE 8 MG: 4 MG/ML VIAL (ML) INJECTION at 08:12:00

## 2024-03-28 RX ADMIN — SODIUM CHLORIDE, SODIUM LACTATE, POTASSIUM CHLORIDE, CALCIUM CHLORIDE: 600; 310; 30; 20 INJECTION, SOLUTION INTRAVENOUS at 10:05:00

## 2024-03-28 RX ADMIN — ACETAMINOPHEN 1000 MG: 10 INJECTION, SOLUTION INTRAVENOUS at 09:33:00

## 2024-03-28 RX ADMIN — ROCURONIUM BROMIDE 15 MG: 10 INJECTION, SOLUTION INTRAVENOUS at 07:49:00

## 2024-03-28 RX ADMIN — DEXAMETHASONE SODIUM PHOSPHATE 8 MG: 4 MG/ML VIAL (ML) INJECTION at 09:28:00

## 2024-03-28 RX ADMIN — SODIUM CHLORIDE, SODIUM LACTATE, POTASSIUM CHLORIDE, CALCIUM CHLORIDE: 600; 310; 30; 20 INJECTION, SOLUTION INTRAVENOUS at 07:43:00

## 2024-03-28 RX ADMIN — GLYCOPYRROLATE 0.4 MG: 0.2 INJECTION, SOLUTION INTRAMUSCULAR; INTRAVENOUS at 09:43:00

## 2024-03-28 RX ADMIN — KETAMINE HYDROCHLORIDE 25 MG: 50 INJECTION, SOLUTION INTRAMUSCULAR; INTRAVENOUS at 07:59:00

## 2024-03-28 RX ADMIN — CEFAZOLIN SODIUM/WATER 2 G: 2 G/20 ML SYRINGE (ML) INTRAVENOUS at 08:01:00

## 2024-03-28 RX ADMIN — KETOROLAC TROMETHAMINE 30 MG: 30 INJECTION, SOLUTION INTRAMUSCULAR; INTRAVENOUS at 09:28:00

## 2024-03-28 RX ADMIN — NEOSTIGMINE METHYLSULFATE 3 MG: 1 INJECTION, SOLUTION INTRAVENOUS at 09:43:00

## 2024-03-28 RX ADMIN — MIDAZOLAM HYDROCHLORIDE 2 MG: 1 INJECTION INTRAMUSCULAR; INTRAVENOUS at 07:46:00

## 2024-03-28 RX ADMIN — ONDANSETRON 4 MG: 2 INJECTION INTRAMUSCULAR; INTRAVENOUS at 09:43:00

## 2024-03-28 RX ADMIN — LIDOCAINE HYDROCHLORIDE 50 MG: 10 INJECTION, SOLUTION EPIDURAL; INFILTRATION; INTRACAUDAL; PERINEURAL at 07:48:00

## 2024-03-28 RX ADMIN — ROCURONIUM BROMIDE 35 MG: 10 INJECTION, SOLUTION INTRAVENOUS at 08:13:00

## 2024-03-28 NOTE — ANESTHESIA PREPROCEDURE EVALUATION
PRE-OP EVALUATION    Patient Name: Vita Gillis    Admit Diagnosis: Cervical radiculopathy [M54.12]    Pre-op Diagnosis: Cervical radiculopathy [M54.12]    anterior cervical discectomy at cervical 6-cervical 7 with placement of arthroplasty device    Anesthesia Procedure: anterior cervical discectomy at cervical 6-cervical 7 with placement of arthroplasty device (Right: Spine Cervical)  INTRAOPERATIVE NEURO MONITORING    Surgeon(s) and Role:     * SOLE Beckford, DO - Primary    Pre-op vitals reviewed.  Temp: 98.4 °F (36.9 °C)  Pulse: 76  Resp: 16  BP: 102/69  SpO2: 100 %  Body mass index is 22.6 kg/m².    Current medications reviewed.  Hospital Medications:   acetaminophen (Tylenol Extra Strength) tab 1,000 mg  1,000 mg Oral Once    lactated ringers infusion   Intravenous Continuous    ceFAZolin (Ancef) 2 g in 20mL IV syringe premix  2 g Intravenous Once       Outpatient Medications:     Medications Prior to Admission   Medication Sig Dispense Refill Last Dose    semaglutide 2 MG/1.5ML Subcutaneous Solution Pen-injector Inject 0.25 mg into the skin once a week. For weightloss  Sundays   3/17/2024    lurasidone 20 MG Oral Tab Take 1 tablet (20 mg total) by mouth daily with food. 30 tablet 2 3/28/2024    desvenlafaxine  MG Oral Tablet 24 Hr Take 1 tablet (100 mg total) by mouth daily. 30 tablet 2 3/28/2024    clobetasol 0.05 % External Solution APPLY TO THE AFFECTED AREA OF SCALP TWICE DAILY TO ITCHY AREAS   3/26/2024    hydrocortisone 2.5 % External Cream    Past Week    ketoconazole 2 % External Cream    Past Week    ketoconazole 2 % External Shampoo    Past Week    Norethindrone (ORTHO MICRONOR) 0.35 MG Oral Tab Take 1 tablet (0.35 mg total) by mouth daily. 84 tablet 3 3/28/2024    lamoTRIgine 150 MG Oral Tab Take 1 tablet (150 mg total) by mouth 2 (two) times daily. 180 tablet 0 3/28/2024    CLONIDINE 0.1 MG Oral Tab TAKE 1 TABLET(0.1 MG) BY MOUTH TWICE DAILY 180 tablet 0 3/28/2024    OMEPRAZOLE  20 MG Oral Capsule Delayed Release TAKE 1 CAPSULE(20 MG) BY MOUTH TWICE DAILY BEFORE MEALS (Patient taking differently: Take 1 capsule (20 mg total) by mouth 2 (two) times daily as needed.) 180 capsule 2 3/21/2024    SUMAtriptan Succinate 100 MG Oral Tab TAKE 1 TABLET BY MOUTH WITHIN 30 MINUTES OF HEADACHE ONSET 9 tablet 0 More than a month       Allergies: Patient has no known allergies.      Anesthesia Evaluation    Patient summary reviewed.    Anesthetic Complications  (-) history of anesthetic complications         GI/Hepatic/Renal      (+) GERD                           Cardiovascular        Exercise tolerance: good     MET: >4                             (-) angina     (-) COATS         Endo/Other    Negative endo/other ROS.                              Pulmonary    Negative pulmonary ROS.                       Neuro/Psych      (+) depression  (+) anxiety                              Past Surgical History:   Procedure Laterality Date    BUNIONECTOMY Bilateral     COLONOSCOPY      COLPOSCOPY, CERVIX W/UPPER ADJACENT VAGINA; W/BIOPSY(S), CERVIX  2008    HPV+    FRACTURE SURGERY      repair of nasal fracture    GASTRO - DMG  08/2016    healed ulcers    OTHER      wisdom teeth extraction    OTHER SURGICAL HISTORY      nose repair 2014    UP GI ENDOSCOPY,BALL DIL,30MM N/A 08/09/2016    Procedure: ESOPHAGOGASTRODUODENOSCOPY, POSSIBLE BIOPSY, POSSIBLE POLYPECTOMY 08215;  Surgeon: Sukh Samayoa MD;  Location: [facility]    UPPER GI ENDO NO BARRETTS  05/2016    esophageal ulcer; HH    UPPER GI ENDOSCOPY,BIOPSY N/A 05/17/2016    Procedure: ESOPHAGOGASTRODUODENOSCOPY, POSSIBLE BIOPSY, POSSIBLE POLYPECTOMY 04397;  Surgeon: Sukh Samayoa MD;  Location: [facility]     Social History     Socioeconomic History    Marital status:    Tobacco Use    Smoking status: Former     Packs/day: 1.00     Years: 18.00     Additional pack years: 0.00     Total pack years: 18.00     Types: Cigarettes     Quit date:  2008     Years since quittin.1    Smokeless tobacco: Never    Tobacco comments:     non-smoker   Vaping Use    Vaping Use: Never used   Substance and Sexual Activity    Alcohol use: Not Currently     Comment: socially    Drug use: Never    Sexual activity: Yes     Partners: Male     Birth control/protection: Vasectomy, Implant   Other Topics Concern    Caffeine Concern Yes     Comment: 1 cup of coffee x day    Exercise No     Comment: 2 x week, cardio    Seat Belt Yes     History   Drug Use Unknown     Available pre-op labs reviewed.  Lab Results   Component Value Date    WBC 7.9 2024    RBC 4.93 2024    HGB 14.2 2024    HCT 41.2 2024    MCV 83.6 2024    MCH 28.8 2024    MCHC 34.5 2024    RDW 13.2 2024    .0 2024     Lab Results   Component Value Date     2024    K 4.7 2024     2024    CO2 30.0 2024    BUN 15 2024    CREATSERUM 0.99 2024    GLU 93 2024    CA 9.4 2024            Airway      Mallampati: I  Mouth opening: >3 FB  TM distance: > 6 cm  Neck ROM: limited Cardiovascular    Cardiovascular exam normal.         Dental             Pulmonary    Pulmonary exam normal.                 Other findings              ASA: 2   Plan: general  NPO status verified and Patient does not meet NPO guidelines.  Patient has not taken beta blockers in last 24 hours.  Post-procedure pain management plan discussed with surgeon and patient.    Comment: I spoke with the patient and discussed the risks of general anesthesia, which include allergic reaction, nausea, vomiting, dental injury, sore throat, awareness, hypotension, as well as more serious cardiac and pulmonary complications. The patient understands and consents to receiving general anesthesia for this procedure.     Plan/risks discussed with: patient  Use of blood product(s) discussed with: patient    Consented to blood  products.          Present on Admission:  **None**

## 2024-03-28 NOTE — ANESTHESIA PROCEDURE NOTES
Peripheral IV  Date/Time: 3/28/2024 7:57 AM  Inserted by: Leidy Richard MD    Placement  Needle size: 18 G  Laterality: right  Location: hand  Local anesthetic: none  Site prep: alcohol  Technique: anatomical landmarks  Attempts: 1

## 2024-03-28 NOTE — OPERATIVE REPORT
Date of surgery  3/28/24     Preoperative dx  cervical radiculopathy       Postoperative dx  cervical radiculopathy     Procedure [please list them in numbered format]   1. Anterior right-sided approach to the cervical spine from C6-7   2. Anterior disc resections at C6-7   4. Microfoaminotomies at C6-7 bilaterally   5. Use of microscope for decompression procedure   6. Anterior interbody placement of artifical disc [Mobi C] at C6-7   11. Intraoperative SSEP monitoring   12. Radiographs for identification of surgical level and appropriate placement of hardware     Surgeon[s] Analy Mcdermott SA     Anesthesia  GETA     IVF 1500 EBL 10  Uout  NR  Specimen  none     Drains  none     Complications none  Condition  stable to PACU     Indications     This is a 47 YO F with intractable neck pain and RUE radiculopathy, having failed [all nonoperative management]. It was mutually decided that surgical intervention was the best option at this point. All risks and benefits of the surgery were explained to the patient, and he/she wished to proceed with the surgery.     Details of surgery     Patient was placed supine on the OR table, and general anesthesia was induced. The head halter traction device was set up and 10 lbs of weight. The neck was extended within the comfortable range as checked preoperatively. 3-inch wide cloth tape was used to tape down the shoulders. All bony prominences were well-padded. [A radioopaque skin marker was placed at what appeared to be the cervical disc level of interest, and a lateral radiograph taken.]   A right-sided, approximately 2-inch long transverse incision was made at approximately the C6 level as denoted by anatomic landmarks [the marker]. A 15-blade scalpel was used to incise the skin and subcutaneous tissues, down through the platysma muscle until the superficial layer of the deep cervical fascia was exposed. This layer was then carefully incised vertically along the  Pts leg bag changed at this time, pt states urine is flowing at this time.  ARMANI Villegas aware, plane for discharge medial border of the sternocleiodomastoid muscle using metzenbaum scissors. Then finger dissection was used to bluntly separate the deeper lateral carotid sheath from the medial tracheo-esophageal bundle. Hand-held cloward retractors were used to expose the longus colli muscles anterior to the spine, and Kitners were used to bluntly dissect them away bilaterally, exposing the periosteum and disc spaces. A 18 gauge needle was used to jana the disc space and a lateral radiograph taken to confirm the level.   Once the level was confirmed radiographically, then subperiosteal dissection was performed  over the disc space[s] and vertebral bodies of interest, taking great precaution not to violate the uninvolved disc spaces. Kitners and bipolar cautery were used. Self-retaining retractors were then placed. [Distraction pins were carefully inserted into the spanning vertebral bodies.] The microscope was then carefully positioned.   Anterior cervical diskectomy [ies] was then commenced.  Annulotomy was performed using an 11-blade. Then small pituitary rongeurs and curettes were used to remove the disk material.   [The cartilaginous endplates were completely removed in preparation of implant/ prosthesis placement]. Posterior vertebral osteophytes were removed with small kerrison rongeurs. Bilateral microforaminotomies were also performed using kerrisons.   The implant size was then estimated using trial implants.  Then the appropriate sized implant was chosen.  It was then carefully malleted into place. The neck distraction across the disk space was then released.   After all implants [grafts] were in place, abundant irrigation of the wound was performed with antibiotic-containing irrigation. All bleeding was controlled with bipolar cautery, thrombin-soaked gelfoam, and floseal. Closure was then commenced. The platysma muscle was closed using interrupted [2-0] vicryl. The subcutaneous layer was closed using [3-0] interrupted  vicryl. The skin was then closed using mastisol and steri-strips. Xeroform and 4x4 gauze was applied and secured with microporous tape. All needle and sponge counts were correct. There were no complications during this surgery.  The patient was then revived, extubated, and taken to recovery room in stable condition.

## 2024-03-28 NOTE — CONSULTS
[facility]PITAL[facility]  CONSULT     Vita Gillis Patient Status:  Outpatient in a Bed    1975 MRN [id number]   Location [facility] 3SW-A Attending SOLE Beckford, DO   Hosp Day # 0 PCP Cary Bacon MD     Reason for consult: Anxiety/Depression  Requested by: Dr. Beckford     History of Present Illness: Vita Gillis is a 48 year old female with some pain.  Patient denies chest pain, SOB, cough, fevers, chills, abdominal pain, nausea or vomiting  No acute focal deficits       Review of Systems:   A comprehensive 14 point review of systems was completed.  Pertinent positives and negatives noted in the HPI.  Past Medical History:  Past Medical History:   Diagnosis Date    Abdominal distention     Abdominal pain     Allergic rhinitis     Anxiety state, unspecified     Back pain     Back problem     Bad breath     Belching     Bloating     Blood in the stool     Constipation     Depression     Dysmenorrhea     Esophageal reflux     Frequent use of laxatives     Heartburn     Hemorrhoids     High cholesterol     History of depression     History of stomach ulcers     Hx of motion sickness     Indigestion     Irregular bowel habits     Loss of appetite     Migraine     Migraines     Nausea     Obesity     Other and unspecified hyperlipidemia     Pain with bowel movements     Problems with swallowing     history of    Sleep disturbance     Stress     Uncomfortable fullness after meals     Weight gain     Weight loss       Past Surgical History:   Past Surgical History:   Procedure Laterality Date    BUNIONECTOMY Bilateral     COLONOSCOPY      COLPOSCOPY, CERVIX W/UPPER ADJACENT VAGINA; W/BIOPSY(S), CERVIX      HPV+    FRACTURE SURGERY      repair of nasal fracture    GASTRO - DMG  2016    healed ulcers    OTHER      wisdom teeth extraction    OTHER SURGICAL HISTORY      nose repair     UP GI ENDOSCOPY,BALL DIL,30MM N/A 2016    Procedure: ESOPHAGOGASTRODUODENOSCOPY, POSSIBLE  BIOPSY, POSSIBLE POLYPECTOMY 33791;  Surgeon: Sukh Samayoa MD;  Location: [facility]    UPPER GI ENDO NO BARRETTS  05/2016    esophageal ulcer; HH    UPPER GI ENDOSCOPY,BIOPSY N/A 05/17/2016    Procedure: ESOPHAGOGASTRODUODENOSCOPY, POSSIBLE BIOPSY, POSSIBLE POLYPECTOMY 17644;  Surgeon: Sukh Samayoa MD;  Location: [facility]     Social History:  reports that she quit smoking about 16 years ago. Her smoking use included cigarettes. She has a 18 pack-year smoking history. She has never used smokeless tobacco. She reports that she does not currently use alcohol. She reports that she does not use drugs.  Family History:   Family History   Problem Relation Age of Onset    Alcohol and Other Disorders Associated Father     Homicide History Father         father strangled mother, patient age 8    Psychiatric Sister         postpartum depression    Depression Son     Other (Other) Son         Duchennes muscular dystrophy    Cancer Maternal Grandmother         lung, brain    Breast Cancer Maternal Grandmother 52    Diabetes Maternal Grandmother     Stroke Maternal Grandmother     Anxiety Maternal Grandmother         undiagnosed    Cancer Maternal Grandfather     Stroke Maternal Grandfather     Lipids Maternal Grandfather     Diabetes Maternal Grandfather     Heart Attack Maternal Grandfather     Diabetes Paternal Grandmother     Depression Paternal Grandmother     Diabetes Paternal Grandfather     Crohn's Disease Maternal Aunt     Breast Cancer Maternal Aunt 62    Depression Other         pat uncle    Suicide History Other         pat uncle completed     Allergies: No Known Allergies  Medications:    No current facility-administered medications on file prior to encounter.     Current Outpatient Medications on File Prior to Encounter   Medication Sig Dispense Refill    omeprazole 20 MG Oral Capsule Delayed Release Take 1 capsule (20 mg total) by mouth 2 (two) times daily as needed.      semaglutide 2  MG/1.5ML Subcutaneous Solution Pen-injector Inject 0.25 mg into the skin once a week. For weightloss  Sundays      lurasidone 20 MG Oral Tab Take 1 tablet (20 mg total) by mouth daily with food. 30 tablet 2    desvenlafaxine  MG Oral Tablet 24 Hr Take 1 tablet (100 mg total) by mouth daily. 30 tablet 2    clobetasol 0.05 % External Solution APPLY TO THE AFFECTED AREA OF SCALP TWICE DAILY TO ITCHY AREAS      hydrocortisone 2.5 % External Cream Apply topically 2 (two) times daily as needed.      ketoconazole 2 % External Cream Apply topically daily as needed.      ketoconazole 2 % External Shampoo Apply 1 Application topically twice a week.      Norethindrone (ORTHO MICRONOR) 0.35 MG Oral Tab Take 1 tablet (0.35 mg total) by mouth daily. 84 tablet 3    lamoTRIgine 150 MG Oral Tab Take 1 tablet (150 mg total) by mouth 2 (two) times daily. 180 tablet 0    CLONIDINE 0.1 MG Oral Tab TAKE 1 TABLET(0.1 MG) BY MOUTH TWICE DAILY 180 tablet 0    SUMAtriptan Succinate 100 MG Oral Tab TAKE 1 TABLET BY MOUTH WITHIN 30 MINUTES OF HEADACHE ONSET 9 tablet 0       Physical Exam:    /72 (BP Location: Right arm)   Pulse 81   Temp 98.2 °F (36.8 °C) (Oral)   Resp 17   Ht 5' 6\" (1.676 m)   Wt 140 lb (63.5 kg)   LMP 02/26/2024 (Exact Date)   SpO2 97%   BMI 22.60 kg/m²   General: No acute distress. Alert and oriented.  Neck: Cervical brace   Respiratory: Clear to auscultation bilaterally. No wheezes. No rhonchi.  Cardiovascular: S1, S2. Regular rhythm, regular rate.   Abdomen: Soft, nontender, nondistended.  Positive bowel sounds.  Musculoskeletal: Moves all extremities.  Extremities: No edema or cyanosis.  Integument: No rashes or lesions.     Diagnostic Data:    Labs:  No results for input(s): \"WBC\", \"HGB\", \"MCV\", \"PLT\", \"BAND\", \"INR\" in the last 168 hours.    Invalid input(s): \"LYM#\", \"MONO#\", \"BASOS#\", \"EOSIN#\"  No results for input(s): \"GLU\", \"BUN\", \"CREATSERUM\", \"GFRAA\", \"GFRNAA\", \"CA\", \"ALB\", \"NA\", \"K\", \"CL\",  \"CO2\", \"ALKPHO\", \"AST\", \"ALT\", \"BILT\", \"TP\" in the last 168 hours.  No results for input(s): \"PTP\", \"INR\" in the last 168 hours.  No results for input(s): \"TROP\", \"CK\" in the last 168 hours.  Imaging: Imaging data reviewed in Epic.  ASSESSMENT / PLAN:     # s/p cervical C6-7 discectomy  -per Spine Sx    #Anxiety- Depression- cont home meds    Will follow peripherally  Please call if any questions or if anything arises        Plan of care discussed with patient, staff   Hank Camacho MD  3/28/2024      The 21st Century Cures Act makes medical notes like these available to patients in the interest of transparency. Please be advised this is a medical document. Medical documents are intended to carry relevant information, facts as evident, and the clinical opinion of the practitioner. The medical note is intended as peer to peer communication and may appear blunt or direct. It is written in medical language and may contain abbreviations or verbiage that are unfamiliar.

## 2024-03-28 NOTE — PLAN OF CARE
NURSING ADMISSION NOTE      Patient admitted via bed  Oriented to room.  Safety precautions initiated.  Bed in low position.  Call light in reach.  Soft collar in place.  Dressing to anterior neck clean, dry, intact.  Color, movement, sensation to all extremities intact.  Denies numbness, tingling.  Denies difficulty swallowing.  Instructed on plan of care, call for all asst needed, discomfort felt, call don't fall protocol.  Verbalized understanding.

## 2024-03-28 NOTE — ANESTHESIA POSTPROCEDURE EVALUATION
[facility]    Vita Gillis Patient Status:  Outpatient in a Bed   Age/Gender 48 year old female MRN [id number]   Location [facility] SURGERY Attending SOLE Beckford DO   Hosp Day # 0 PCP Cary Bacon MD       Anesthesia Post-op Note    anterior cervical discectomy at cervical 6-cervical 7 with placement of arthroplasty device    Procedure Summary       Date: 03/28/24 Room / Location:  MAIN OR [address] [place] [address] MAIN OR    Anesthesia Start: 0743 Anesthesia Stop: 1005    Procedures:       anterior cervical discectomy at cervical 6-cervical 7 with placement of arthroplasty device (Right: Spine Cervical)      INTRAOPERATIVE NEURO MONITORING Diagnosis:       Cervical radiculopathy      (Cervical radiculopathy [M54.12])    Surgeons: SOLE Beckford DO Anesthesiologist: Leidy Richard MD    Anesthesia Type: general ASA Status: 2            Anesthesia Type: general    Vitals Value Taken Time   /76 03/28/24 1025   Temp 98.6 °F (37 °C) 03/28/24 1000   Pulse 86 03/28/24 1030   Resp 13 03/28/24 1030   SpO2 99 % 03/28/24 1030   Vitals shown include unfiled device data.    Patient Location: PACU    Anesthesia Type: general    Airway Patency: patent and extubated    Postop Pain Control: adequate    Mental Status: mildly sedated but able to meaningfully participate in the post-anesthesia evaluation    Nausea/Vomiting: none    Cardiopulmonary/Hydration status: stable euvolemic    Complications: no apparent anesthesia related complications    Postop vital signs: stable    Dental Exam: Unchanged from Preop    Patient to be discharged from PACU when criteria met.

## 2024-03-28 NOTE — ANESTHESIA PROCEDURE NOTES
Airway  Date/Time: 3/28/2024 7:53 AM  Urgency: elective    Airway not difficult    General Information and Staff    Patient location during procedure: OR  Anesthesiologist: Leidy Richard MD  Performed: anesthesiologist   Performed by: Leidy Richard MD  Authorized by: Leidy Richard MD      Indications and Patient Condition  Indications for airway management: anesthesia  Spontaneous Ventilation: absent  Sedation level: deep  Preoxygenated: yes  Patient position: sniffing  Mask difficulty assessment: 1 - vent by mask    Final Airway Details  Final airway type: endotracheal airway      Successful airway: ETT  Cuffed: yes   Successful intubation technique: Video laryngoscopy  Facilitating devices/methods: intubating stylet  Endotracheal tube insertion site: oral  Blade: GlideScope  Blade size: #3  ETT size (mm): 7.0    Cormack-Lehane Classification: grade I - full view of glottis  Placement verified by: capnometry   Cuff volume (mL): 6  Measured from: lips  ETT to lips (cm): 21  Number of attempts at approach: 1  Number of other approaches attempted: 0

## 2024-03-29 ENCOUNTER — APPOINTMENT (OUTPATIENT)
Dept: GENERAL RADIOLOGY | Facility: HOSPITAL | Age: 49
End: 2024-03-29
Attending: NEUROLOGICAL SURGERY
Payer: COMMERCIAL

## 2024-03-29 VITALS
BODY MASS INDEX: 22.5 KG/M2 | OXYGEN SATURATION: 100 % | HEART RATE: 75 BPM | TEMPERATURE: 99 F | HEIGHT: 66 IN | RESPIRATION RATE: 16 BRPM | WEIGHT: 140 LBS | DIASTOLIC BLOOD PRESSURE: 70 MMHG | SYSTOLIC BLOOD PRESSURE: 111 MMHG

## 2024-03-29 LAB
ANION GAP SERPL CALC-SCNC: 3 MMOL/L (ref 0–18)
BUN BLD-MCNC: 9 MG/DL (ref 9–23)
CALCIUM BLD-MCNC: 8.6 MG/DL (ref 8.5–10.1)
CHLORIDE SERPL-SCNC: 111 MMOL/L (ref 98–112)
CO2 SERPL-SCNC: 28 MMOL/L (ref 21–32)
CREAT BLD-MCNC: 0.78 MG/DL
EGFRCR SERPLBLD CKD-EPI 2021: 95 ML/MIN/1.73M2 (ref 60–?)
GLUCOSE BLD-MCNC: 130 MG/DL (ref 70–99)
HCT VFR BLD AUTO: 36.4 %
HGB BLD-MCNC: 12.3 G/DL
OSMOLALITY SERPL CALC.SUM OF ELEC: 294 MOSM/KG (ref 275–295)
POTASSIUM SERPL-SCNC: 3.9 MMOL/L (ref 3.5–5.1)
SODIUM SERPL-SCNC: 142 MMOL/L (ref 136–145)

## 2024-03-29 PROCEDURE — 72040 X-RAY EXAM NECK SPINE 2-3 VW: CPT | Performed by: NEUROLOGICAL SURGERY

## 2024-03-29 RX ORDER — DEXAMETHASONE 2 MG/1
TABLET ORAL
Qty: 22 TABLET | Refills: 0 | Status: SHIPPED | OUTPATIENT
Start: 2024-03-29 | End: 2024-04-06

## 2024-03-29 RX ORDER — PSEUDOEPHEDRINE HCL 30 MG
100 TABLET ORAL 2 TIMES DAILY
Qty: 30 CAPSULE | Refills: 0 | Status: SHIPPED | OUTPATIENT
Start: 2024-03-29

## 2024-03-29 RX ORDER — DIAZEPAM 5 MG/1
5 TABLET ORAL EVERY 6 HOURS PRN
Qty: 30 TABLET | Refills: 0 | Status: SHIPPED | OUTPATIENT
Start: 2024-03-29 | End: 2024-04-08

## 2024-03-29 RX ORDER — HYDROCODONE BITARTRATE AND ACETAMINOPHEN 5; 325 MG/1; MG/1
1-2 TABLET ORAL EVERY 4 HOURS PRN
Qty: 40 TABLET | Refills: 0 | Status: SHIPPED | OUTPATIENT
Start: 2024-03-29

## 2024-03-29 NOTE — PLAN OF CARE
Alert and Oriented x4. On RA. VSS. Dressing to ant. Neck C/D/I. Mild Pain controlled by PRN medications. Denies N/T. Voiding freely. Tolerating diet. Denies N/V. Call light within reach at this time.    Plan: PT/OT murtaza

## 2024-03-29 NOTE — PLAN OF CARE
NURSING DISCHARGE NOTE    Discharged Home via Wheelchair.  Accompanied by Support staff  Belongings Taken by patient/family.  Medications delivered via Meds to Beds.  Viewed discharge video.  Verbal and written discharge instructions given by discharge rn.  All questions answered.

## 2024-03-29 NOTE — OCCUPATIONAL THERAPY NOTE
OCCUPATIONAL THERAPY EVALUATION - INPATIENT     Room Number: 374/374-A  Evaluation Date: 3/29/2024  Type of Evaluation: Initial  Presenting Problem: s/p cervical C6-7 discectomy    Physician Order: IP Consult to Occupational Therapy  Reason for Therapy: ADL/IADL Dysfunction and Discharge Planning    OCCUPATIONAL THERAPY ASSESSMENT   Patient is currently functioning at baseline with self care and functional mobility. Prior to admission, patient's baseline is independent.  Patient met all OT goals at independent level.  Patient reports no further questions/concerns at this time.     Patient will be discharged from OT services at this time. Will benefit from home at discharge from the hospital.        History Related to Current Admission: Patient is a 48 year old female admitted on 3/28/2024 with Presenting Problem: s/p cervical C6-7 discectomy. Co-Morbidities : anxiety, depression,migraine, PTSD    Recommendations for nursing staff:   Transfers: none   Toileting location: toilet    EVALUATION SESSION  Patient Start of Session: supine  FUNCTIONAL TRANSFER ASSESSMENT  Sit to Stand: Edge of Bed  Edge of Bed: Independent  Toilet Transfer: Independent    BED MOBILITY  Supine to Sit : Independent  Sit to Supine (OT): Independent  Scooting: independent    BALANCE ASSESSMENT  Static Sitting: Independent  Sitting Bilateral: Independent  Static Standing: Independent  Standing Bilateral: Independent    FUNCTIONAL ADL ASSESSMENT  Eating: Modified Independent  LB Dressing Seated: Modified Independent  Toileting Seated: Independent      ACTIVITY TOLERANCE: patient reported soreness in neck area. Vitals stable.            BP: 117/75  BP Location: Right arm  BP Method: Automatic  Patient Position: Sitting    O2 SATURATIONS       Cognition  Overall cognition WFL    Upper extremity   ROM: within functional limits   Strength: is within functional limits   Coordination:  Gross motor: WFL  Fine motor: WFL    EDUCATION PROVIDED  Patient:  Role of Occupational Therapy; Plan of Care; Discharge Recommendations; Functional Transfer Techniques; Posture/Positioning; Surgical Precautions; Energy Conservation; Proper Body Mechanics; Bracing Education Provided  Patient's Response to Education: Verbalized Understanding      Equipment used: gait belt   Demonstrates functional use.     Therapist comments: pleasant patient, agreeable to all presented tasks. Patient simulated LB dressing, and completed toilet transfer independently. Patient was educated on spine precautions, cervical bracing, and proper body mechanics. Patient verbalized understanding and eager to go home.     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 Patient has 3 dogs.     SUBJECTIVE   \" I care of my 25 year old son\"     PAIN ASSESSMENT  Rating: Unable to rate  Location: soreness in neck area  Management Techniques: Body mechanics;Relaxation    OBJECTIVE  Precautions: Other (Comment);Spine (soft cervical brace for comfort only)  Fall Risk: Standard fall risk    WEIGHT BEARING RESTRICTION  Weight Bearing Restriction: None                ASSESSMENTS    AM-PAC ‘6-Clicks’ Inpatient Daily Activity Short Form  -   Putting on and taking off regular lower body clothing?: None  -   Bathing (including washing, rinsing, drying)?: None  -   Toileting, which includes using toilet,  bedpan or urinal? : None  -   Putting on and taking off regular upper body clothing?: None  -   Taking care of personal grooming such as brushing teeth?: None  -   Eating meals?: None    AM-PAC Score:  Score: 24  Approx Degree of Impairment: 0%  Standardized Score (AM-PAC Scale): 57.54    PLAN                  Patient Evaluation Complexity Level:   Occupational Profile/Medical History LOW - Brief history including review of medical or therapy records    Specific performance deficits impacting engagement in ADL/IADL LOW  1 - 3 performance deficits    Client Assessment/Performance Deficits LOW - No comorbidities nor modifications of tasks    Clinical Decision Making LOW - Analysis of occupational profile, problem-focused assessments, limited treatment options    Overall Complexity LOW     OT Session Time: 30 minutes  Self-Care Home Management: 15 minutes  Therapeutic Activity: 8 minutes

## 2024-03-29 NOTE — DISCHARGE INSTRUCTIONS
Cervical Spine Surgery: Discharge Instructions     Activity Restrictions  No twisting, pulling, pushing or lifting > 10 lbs (a gallon of milk is approximately 8 lbs)  No lifting anything over your head.  Turn your body as a unit, especially if you use a hard collar. Don’t turn your head suddenly or extremely to look from side to side.     Sitting  Sit with your knees at about the same level as your hips; use a footstool if needed.  Firm chairs with straight backs give better support’ recliners are OK to use.  Change position every 20-30 minutes when sitting (example: after sitting, stand, then you can sit again for another 20-30 minutes).     Walking is your best exercise.  Listen to your body and walk as much as you tolerate.  Walk several times a day.  Smaller distances are better.  Your goal is to increase the distance you walk each day.  Remember to listen to your body.     Stairs  Climb as needed.     Sex  After two weeks and only when comfortable.  Stop if causing pain.  Check with surgeon for more information.     Driving  Check with physician at office visit when you may resume driving.  Do not drive while taking narcotics or muscle relaxants  No driving while using or needing a cervical collar.  Adhere to sitting restrictions.  You may be a passenger.  Wear your seat belt.     Soft Cervical Collar  Wear for comfort, as needed  May remove when showering. Replace as needed when done showering.     Incision Site Care and Dressing  Your incision is closed with Steri Strips and dissolvable stitches under the skin.  Do not remove steri strips. They will be removed at your 1 week post-op visit.  No submerging incision (no baths, pools) until cleared by surgeon.  After shower, pat incision dry with clean towel.  Do not apply any lotions or ointments to incision.  After showering, you may apply new dry dressing.    Swallowing   You may have a sore throat and mild difficulty swallowing after this procedure. This is  to be expected.    If you experience choking or coughing with solids or liquids, please let our office know right away so we may   provide guidance and evaluate you sooner if needed.      Return to work  When cleared by surgeon. Discuss specific work activities with your surgeon.     Sleeping  Log roll to turn.  Support your neck with one pillow keeping it in a neutral position  Sleep on back or side avoiding face down position     Diet and Constipation Prevention  Soft diet may help if you have discomfort while swallowing.  Cold drinks or food may feel more soothing.  Drink six to eight glasses liquid (water, juice) per day.  Eat a high fiber diet (bran, vegetables, fruit).  Use an over the counter stool softener such as Colace or Senakot while taking narcotics to prevent constipation, or add a laxative such as Miralax or Milk of Magnesia as needed.  An enema or suppository may be needed if above measures do not work.     Smoking  Smoking is prohibited after surgery, as smoking will inhibit the spine from healing.  Even one cigarette a day will cause problems.  Chewing tobacco, nicotine gum or patches will also inhibit bone healing.       Pain Management     Relaxation techniques  A way to focus your attention other than on your pain. Your brain makes endorphins that are a natural body chemical that can decrease pain. Some examples of relaxation techniques are deep breathing, listening to music or meditating.     Medication  No NSAIDS until okay with surgeon.  NSAIDS (non-steroidal anti-inflammatory) include: Aspirin, Motrin, ibuprofen, Advil, Aleve, Naprosyn, Indocin, Nuprin, Vioxx, Celebrex, Bextra.   Tylenol (acetaminophen) is okay. Caution if your pain med contains Tylenol (acetaminophen); maximum 3 gms of Tylenol (acetaminophen) in 24 hours.  Take muscle relaxants and pain medications as prescribed allowing 30-45 minutes to take effect.  Do NOT drink alcohol while on pain medication.  Do NOT drive or operate  machinery while taking narcotic medcations  Monitor need for pain medication refills closely.  Call pharmacy or physician office at least five days before out of pain medication. If calling physician office after 3 pm, it will be handled on the next business day.     Post-op Office Visit  Schedule 2 weeks after surgery with surgeon as directed at discharge  Schedule one week follow up with Primary Care Physician. Review all medications.     When to contact your surgeon  Temp > 101F; take your temperature twice a day.  Increased pain, swelling, redness, or any drainage to incision.  Separation of incision.  Sudden reappearance of arm pain that won’t go away with pain medication.  Increased numbness or weakness.  Severe headaches.  Increased difficulty swallowing  Difficulty urinating or having a bowel movement     Go directly to the ER or CALL 911 if you:  become short of breath  have chest pain  cough up blood  have unexplained anxiety with breathing

## 2024-03-29 NOTE — PROGRESS NOTES
[facility]  Neurosurgery Progress Note    Vita Gillis Patient Status:  Outpatient in a Bed    1975 MRN [id number]   Location [facility] 3SW-A Attending SOLE Beckford, DO   Hosp Day # 0 PCP Cary Bacon MD     PRINCIPLE PROBLEM:   C6-7 ACDF POD #1    SUBJECTIVE     Pt examined, sitting in chair. Reports some incisional discomfort, sore throat and posterior upper back pain. Feels well otherwise. She denies numbness, tingling or weakness of UE or LE. She would like to go home today.     OBJECTIVE/PHYSICAL EXAM     VITALS:  /76 (BP Location: Right arm)   Pulse 83   Temp 98.7 °F (37.1 °C) (Oral)   Resp 16   Ht 66\"   Wt 140 lb (63.5 kg)   LMP 2024 (Exact Date)   SpO2 99%   BMI 22.60 kg/m²     GENERAL: No acute distress, non-toxic appearing, mood appropriate    HEENT: Normocephalic, atraumatic    RESP:  Respirations non-labored, even    CV:  NSR on tele    NEURO: Alert and oriented x3. Sensation to light touch is intact bilaterally. SILVA x 4. Gait deferred.     UPPER EXTREMITY STRENGTH:    Deltoid  Biceps  Triceps     Finger abduction     Right 5 5 5 5 5     Left 5 5 5 5 5     SKIN: Surgical dressing C/D/I without drainage, erythema, edema.    LABS     Lab Results   Component Value Date    HGB 12.3 2024    HCT 36.4 2024    CREATSERUM 0.78 2024    BUN 9 2024     2024    K 3.9 2024     2024    CO2 28.0 2024     2024    CA 8.6 2024     IMAGING     XR FLUOROSCOPY C-ARM TIME LESS THAN 1 HOUR (CPT=76000)    Result Date: 3/28/2024  CONCLUSION:  1. Fluoroscopy and technical time provided.   LOCATION:  [place]    Dictated by (CST): Rochelle Mcduffie MD on 3/28/2024 at 10:05 AM     Finalized by (CST): Rochelle Mcduffie MD on 3/28/2024 at 10:06 AM        ASSESSMENT & PLAN     ASSESSMENT:  C6-7 ACDF POD #1    PLAN:  PO pain medications and muscle relaxants as ordered   DC east catheter  Aspen collar when  OOB  PT/OT  Pain medications as ordered  Medical management per hospitalist  DVT prophylaxis - SCDs, TEDs  Okay to DC from a Neurosurgical standpoint  Reviewed DC instructions  Medications sent to inpatient pharmacy   F/U appointment scheduled    Plan was discussed with Dr. Beckford.    Luana Oseguera, APRN-NP  [facility]  3/29/2024, 8:17 AM

## 2024-03-29 NOTE — PHYSICAL THERAPY NOTE
PHYSICAL THERAPY EVALUATION - INPATIENT     Room Number: 374/374-A  Evaluation Date: 3/29/2024  Type of Evaluation: Initial  Physician Order: PT Eval and Treat    Presenting Problem: ACD C6-C7 w/ placement of arthroplasty device  Co-Morbidities : anxiety, depression, migraines, PTSD  Reason for Therapy: Mobility Dysfunction and Discharge Planning    Procedure Performed 3/28/24 Dr Beckford:   anterior cervical discectomy at cervical 6-cervical 7 with placement of arthroplasty device    PHYSICAL THERAPY ASSESSMENT   Patient is a 48 year old female admitted 3/28/2024 for ACD C6-C7 w/ placement of arthroplasty device.   Patient is currently functioning near baseline with bed mobility, transfers, gait, stair negotiation, maintaining seated position, standing prolonged periods, and performing household tasks. Prior to admission, patient's baseline is indep.     Patient currently does not meet criteria for skilled inpatient physical therapy services, however patient will continue to benefit from QID ambulation with nursing staff.  Pt is discharged from IP PT services.  RN aware to re-order if there is a decline in mobility status.      PLAN  Patient has been evaluated and presents with no skilled Physical Therapy needs at this time.  Patient discharged from Physical Therapy services.  Please re-order if a new functional limitation presents during this admission.    GOALS  Patient was able to achieve the following goals ...    Patient was able to transfer At previous, functional level  Safely and independently   Patient able to ambulate on level surfaces At previous, functional level  Safely and independently     HOME SITUATION  Type of Home: House   Home Layout: Two level  Stairs to Enter : 3  Railing: No  Stairs to Bedroom: 12  Railing: Yes    Lives With: Spouse;Family (2 sons)  Drives: Yes  Patient Owned Equipment: None  Patient Regularly Uses: Glasses    Prior Level of [place]: Per pt lives in two story home; bed/bath  upstairs. Lives with spouse, and 2 sons. One son with duchenne muscular dystrophy. Pt provides quite a bit of assist to son (use of overhead lift, transfers shower/toilet chair, feeding, nataly-care etc.) Spouse and mother in law will be able to assist son and patient. No hx of falls. No use of device. Has an in home day care. Prior to surgery had RUE tingling/numbness and pain, not present now at time of eval.     SUBJECTIVE  \"I tried everything I could before having to have surgery.\"      OBJECTIVE  Precautions: Spine (soft collar for comfort)  Fall Risk: Standard fall risk    WEIGHT BEARING RESTRICTION  Weight Bearing Restriction: None                PAIN ASSESSMENT  Ratin  Location: anterior neck  Management Techniques: Activity promotion;Breathing techniques;Body mechanics;Relaxation;Repositioning    COGNITION  Overall Cognitive Status:  WFL - within functional limits    RANGE OF MOTION AND STRENGTH ASSESSMENT  Upper extremity ROM and strength are within functional limits   Lower extremity ROM is within functional limits   Lower extremity strength is within functional limits     BALANCE  Static Sitting: Good  Dynamic Sitting: Fair +  Static Standing: Fair  Dynamic Standing: Fair -    ADDITIONAL TESTS                                    ACTIVITY TOLERANCE                         O2 WALK       NEUROLOGICAL FINDINGS                        AM-PAC '6-Clicks' INPATIENT SHORT FORM - BASIC MOBILITY  How much difficulty does the patient currently have...  Patient Difficulty: Turning over in bed (including adjusting bedclothes, sheets and blankets)?: None   Patient Difficulty: Sitting down on and standing up from a chair with arms (e.g., wheelchair, bedside commode, etc.): None   Patient Difficulty: Moving from lying on back to sitting on the side of the bed?: None   How much help from another person does the patient currently need...   Help from Another: Moving to and from a bed to a chair (including a wheelchair)?:  None   Help from Another: Need to walk in hospital room?: None   Help from Another: Climbing 3-5 steps with a railing?: A Little       AM-PAC Score:  Raw Score: 23   Approx Degree of Impairment: 11.2%   Standardized Score (AM-PAC Scale): 56.93   CMS Modifier (G-Code): CI    FUNCTIONAL ABILITY STATUS  Gait Assessment   Functional Mobility/Gait Assessment  Gait Assistance: Independent  Distance (ft): 200, 100  Assistive Device: None  Pattern: Within Functional Limits  Stairs: Stoop/curb  Stoop/Curb: supervision    Skilled Therapy Provided     Bed Mobility: NT - completed w/ OT- re-iterated importance of log roll technique     Transfer Mobility:  Sit to stand: indep   Stand to sit: indep  Gait = indep w/o device x 200 feet, 100 feet. Slight decr in storm.     Therapist's comments:   Patient presents sitting up in bedside chair, just finished working with OT. Discussed role and goal of physical therapy in hospital setting. Pt in agreement to session. Reports 5-6/10 pain currently in anterior neck, but denies RUE symptoms that were present prior to surgery.   Educated on spine precautions and use of soft collar for comfort- pt verbalizes understanding.  Sit to stand independently. Ambulated 200 feet w/o device independently. Ascended/descended small stoop/large curb step at supervision. Ambulated 100 feet back to room without difficulties. Pt in bathroom at end of session- RN/PCT aware. Pt is up ad tayler at this time, no further concerns.     Exercise/Education Provided:  Bed mobility  Body mechanics  Energy conservation  Functional activity tolerated  Gait training  Posture  Transfer training    Patient End of Session: Needs met;Call light within reach;RN aware of session/findings;All patient questions and concerns addressed;In bathroom - nursing staff aware;Discussed recommendations with /    Patient Evaluation Complexity Level:  History Moderate - 1 or 2 personal factors and/or co-morbidities    Examination of body systems Low - addressing 1-2 elements   Clinical Presentation Low - Stable   Clinical Decision Making Low Complexity     PT Session Time: 20 minutes  Gait Trainin minutes

## 2024-03-29 NOTE — PROGRESS NOTES
AVS reviewed, IV dc'd ,verbalized understanding of discharge instructions , will dc home once meds delivered.

## 2024-03-29 NOTE — PLAN OF CARE
Neck dressing clean, dry, intact.  Soft collar for comfort as needed. Color, movement, sensation to bilateral upper extremities intact.  Up in chair and ambulating in room and hallway.  Gait steady.  States pain well controlled on oral pain medication.  Instructed on plan of care, call for all asst needed, discomfort felt, plan to discharge today.  Verbalized understanding.

## 2024-04-01 ENCOUNTER — TELEPHONE (OUTPATIENT)
Dept: SURGERY | Facility: CLINIC | Age: 49
End: 2024-04-01

## 2024-04-01 NOTE — TELEPHONE ENCOUNTER
Received pt reminder notice:    \"Pt scheduled for surgery on 3.28.24 with Dr. Beckford \"    Routed to nursing basket for call.

## 2024-04-01 NOTE — TELEPHONE ENCOUNTER
Attempted to conduct a Post operative outreach call with patient.  Patient had anterior cervical discectomy at cervical 6-cervical 7 with placement of arthroplasty device  surgery with Dr. Beckford on 3/28/2024     Next post-op appointments with patient:    1 week: 4/8/2024 at  2pm with SHELL Ruby   4 week: 4/29/2024 at 1pm with Dr. SOLE Beckford     No answer, LMTCB

## 2024-04-08 ENCOUNTER — OFFICE VISIT (OUTPATIENT)
Dept: SURGERY | Facility: CLINIC | Age: 49
End: 2024-04-08
Payer: COMMERCIAL

## 2024-04-08 VITALS
WEIGHT: 140 LBS | BODY MASS INDEX: 22.5 KG/M2 | DIASTOLIC BLOOD PRESSURE: 66 MMHG | SYSTOLIC BLOOD PRESSURE: 112 MMHG | HEART RATE: 82 BPM | HEIGHT: 66 IN

## 2024-04-08 DIAGNOSIS — Z98.890 S/P CERVICAL DISC REPLACEMENT: Primary | ICD-10-CM

## 2024-04-08 RX ORDER — DIAZEPAM 5 MG/1
5 TABLET ORAL EVERY 6 HOURS PRN
Qty: 30 TABLET | Refills: 0 | Status: SHIPPED | OUTPATIENT
Start: 2024-04-08

## 2024-04-08 NOTE — PATIENT INSTRUCTIONS
Refill policies:    Allow 2-3 business days for refills; controlled substances may take longer.  Contact your pharmacy at least 5 days prior to running out of medication and have them send an electronic request or submit request through the “request refill” option in your [employer] account.  Refills are not addressed on weekends; covering physicians do not authorize routine medications on weekends.  No narcotics or controlled substances are refilled after noon on Fridays or by on call physicians.  By law, narcotics must be electronically prescribed.  A 30 day supply with no refills is the maximum allowed.  If your prescription is due for a refill, you may be due for a follow up appointment.  To best provide you care, patients receiving routine medications need to be seen at least once a year.  Patients receiving narcotic/controlled substance medications need to be seen at least once every 3 months.  In the event that your preferred pharmacy does not have the requested medication in stock (e.g. Backordered), it is your responsibility to find another pharmacy that has the requested medication available.  We will gladly send a new prescription to that pharmacy at your request.    Scheduling Tests:    If your physician has ordered radiology tests such as MRI or CT scans, please contact Central Scheduling at [phone] right away to schedule the test.  Once scheduled, the [facility] Centralized Referral Team will work with your insurance carrier to obtain pre-certification or prior authorization.  Depending on your insurance carrier, approval may take 3-10 days.  It is highly recommended patients assure they have received an authorization before having a test performed.  If test is done without insurance authorization, patient may be responsible for the entire amount billed.      Precertification and Prior Authorizations:  If your physician has recommended that you have a procedure or additional testing performed the [facility]  Centralized Referral Team will contact your insurance carrier to obtain pre-certification or prior authorization.    You are strongly encouraged to contact your insurance carrier to verify that your procedure/test has been approved and is a COVERED benefit.  Although the [facility] Centralized Referral Team does its due diligence, the insurance carrier gives the disclaimer that \"Although the procedure is authorized, this does not guarantee payment.\"    Ultimately the patient is responsible for payment.   Thank you for your understanding in this matter.  Paperwork Completion:  If you require FMLA or disability paperwork for your recovery, please make sure to either drop it off or have it faxed to our office at [phone]. Be sure the form has your name and date of birth on it.  The form will be faxed to our Forms Department and they will complete it for you.  There is a 25$ fee for all forms that need to be filled out.  Please be aware there is a 10-14 day turnaround time.  You will need to sign a release of information (NGA) form if your paperwork does not come with one.  You may call the Forms Department with any questions at [phone].  Their fax number is [phone].

## 2024-04-08 NOTE — PROGRESS NOTES
[facility]  Neurosurgery Clinic Visit: Post-Op Follow Up  2024     Vita Gillis Patient Status:  No patient class for patient encounter    1975 MRN [id number]   Location [facility] Attending No att. providers found   Hosp Day # 0 PCP Cary Bacon MD     REASON FOR VISIT: 1 week post-op follow up    SUBJECTIVE     Vita Gillis is a pleasant 48 year old female here for follow up s/p C6-7 ADR with Dr. Beckford on 3/28/24. Patient was discharged from the hospital on 3/29/24. She is accompanied by her mother.    Since surgery, patient states she has been doing well. She reports muscle tightness to the posterior neck and B/L shoulders. She is pleased with how little pain she has and is taking pain medication sparingly. She is taking valium regularly which provides relief. Denies any new pain, paresthesias or weakness. No swallowing issues. No incisional issues.    MEDICAL HISTORY     Past Medical History:   Diagnosis Date    Abdominal distention     Abdominal pain     Allergic rhinitis     Anxiety state, unspecified     Back pain     Back problem     Bad breath     Belching     Bloating     Blood in the stool     Constipation     Depression     Dysmenorrhea     Esophageal reflux     Frequent use of laxatives     Heartburn     Hemorrhoids     High cholesterol     History of depression     History of stomach ulcers     Hx of motion sickness     Indigestion     Irregular bowel habits     Loss of appetite     Migraine     Migraines     Nausea     Obesity     Other and unspecified hyperlipidemia     Pain with bowel movements     Problems with swallowing     history of    Sleep disturbance     Stress     Uncomfortable fullness after meals     Weight gain     Weight loss       Past Surgical History:   Procedure Laterality Date    BUNIONECTOMY Bilateral     COLONOSCOPY      COLPOSCOPY, CERVIX W/UPPER ADJACENT VAGINA; W/BIOPSY(S),  CERVIX  2008    HPV+    FRACTURE SURGERY      repair of nasal fracture    GASTRO - DMG  08/2016    healed ulcers    OTHER      wisdom teeth extraction    OTHER SURGICAL HISTORY      nose repair 2014    OTHER SURGICAL HISTORY Right 03/28/2024    anterior cervical discectomy at cervical 6-cervical 7 with placement of arthroplasty device    UP GI ENDOSCOPY,BALL DIL,30MM N/A 08/09/2016    Procedure: ESOPHAGOGASTRODUODENOSCOPY, POSSIBLE BIOPSY, POSSIBLE POLYPECTOMY 90475;  Surgeon: Sukh Samayoa MD;  Location: [facility]    UPPER GI ENDO NO BARRETTS  05/2016    esophageal ulcer; HH    UPPER GI ENDOSCOPY,BIOPSY N/A 05/17/2016    Procedure: ESOPHAGOGASTRODUODENOSCOPY, POSSIBLE BIOPSY, POSSIBLE POLYPECTOMY 01423;  Surgeon: Sukh Samayoa MD;  Location: [facility]      Family History   Problem Relation Age of Onset    Alcohol and Other Disorders Associated Father     Homicide History Father         father strangled mother, patient age 8    Psychiatric Sister         postpartum depression    Depression Son     Other (Other) Son         Duchennes muscular dystrophy    Cancer Maternal Grandmother         lung, brain    Breast Cancer Maternal Grandmother 52    Diabetes Maternal Grandmother     Stroke Maternal Grandmother     Anxiety Maternal Grandmother         undiagnosed    Cancer Maternal Grandfather     Stroke Maternal Grandfather     Lipids Maternal Grandfather     Diabetes Maternal Grandfather     Heart Attack Maternal Grandfather     Diabetes Paternal Grandmother     Depression Paternal Grandmother     Diabetes Paternal Grandfather     Crohn's Disease Maternal Aunt     Breast Cancer Maternal Aunt 62    Depression Other         pat uncle    Suicide History Other         pat uncle completed      Social History     Socioeconomic History    Marital status:    Tobacco Use    Smoking status: Former     Packs/day: 1.00     Years: 18.00     Additional pack years: 0.00     Total pack years: 18.00      Types: Cigarettes     Quit date: 2008     Years since quittin.2    Smokeless tobacco: Never    Tobacco comments:     non-smoker   Vaping Use    Vaping Use: Never used   Substance and Sexual Activity    Alcohol use: Not Currently     Comment: socially    Drug use: Never    Sexual activity: Yes     Partners: Male     Birth control/protection: Vasectomy, Implant   Other Topics Concern    Caffeine Concern Yes     Comment: 1 cup of coffee x day    Exercise No     Comment: 2 x week, cardio    Seat Belt Yes      No Known Allergies     MEDICATIONS     No outpatient medications have been marked as taking for the 24 encounter (Office Visit) with Luana Oseguera, SHELL.     REVIEW OF SYSTEMS     Denies fever, chills, shortness of breath, chest pain, or calf pain.     PHYSICAL EXAMINATION     VITALS: /66   Pulse 82   Ht 66\"   Wt 140 lb (63.5 kg)   LMP 2024 (Exact Date)   BMI 22.60 kg/m²     GENERAL: No acute distress, non-toxic appearing    HEENT:  Normocephalic, atraumatic    SKIN: Warm, dry. Surgical incision is approximated with steri strips. Incision is clean, dry and intact without signs of drainage, edema, or erythema.     NEUROLOGICAL: Alert and oriented x 3. Speech fluent. Comprehension intact. Face is symmetrical. CN II-XII are grossly intact.  Gait intact, non-antalgic. Sensation to light touch is intact bilaterally.  DTRs 2/4 bilaterally.     UPPER EXTREMITY STRENGTH:    Deltoid  Biceps  Triceps     Finger abduction   Right 5 5 5 5 5   Left 5 5 5 5 5       IMAGING     No new imaging to review    ASSESSMENT AND PLAN     ASSESSMENT:   1. S/P cervical disc replacement       PLAN:   Diazepam refilled   Continue to wean narcotics as able  No driving or operating machinery while taking narcotics  No lifting greater than 5 lbs until next appointment  Reviewed activity restrictions and incisional care  F/U in 3 weeks    Luana Oseguera, MSN, APRN, [facility]   Neurosurgery   [address] [place] [address], [address]  [place] [address]  [phone]

## 2024-04-08 NOTE — PROGRESS NOTES
Patient is here today for Post Op visit--3/28/2024- Sx: Right anterior cervical discectomy at cervical 6-cervical 7 with placement of arthroplasty device      Pain Scale:  3/10  some \"tightness in muscles of back of neck\", pt denies fever, chills    Treatment:  Extra Strength Tylenol, Norco prn and Diazepam with good relief    Imaging:  3/29/24-XR Cervical Spine

## 2024-04-10 ENCOUNTER — TELEPHONE (OUTPATIENT)
Dept: FAMILY MEDICINE CLINIC | Facility: CLINIC | Age: 49
End: 2024-04-10

## 2024-04-10 ENCOUNTER — HOSPITAL ENCOUNTER (OUTPATIENT)
Dept: NUCLEAR MEDICINE | Facility: HOSPITAL | Age: 49
Discharge: HOME | End: 2024-04-10
Attending: NURSE PRACTITIONER
Payer: COMMERCIAL

## 2024-04-10 DIAGNOSIS — Z12.39 SCREENING BREAST EXAMINATION: ICD-10-CM

## 2024-04-10 DIAGNOSIS — R92.30 DENSE BREAST TISSUE: ICD-10-CM

## 2024-04-10 PROCEDURE — 78800 RP LOCLZJ TUM 1 AREA 1 D IMG: CPT | Performed by: NURSE PRACTITIONER

## 2024-04-10 NOTE — TELEPHONE ENCOUNTER
Per the mammogram report:    A letter explaining the results in lay terms has been sent to the patient.  This exam was evaluated with a computer-aided device.  This patient's information has been entered into a reminder system with a target due date for the next mammogram.

## 2024-04-10 NOTE — TELEPHONE ENCOUNTER
----- Message from SHELL Duque sent at 4/10/2024  4:14 PM CDT -----  Molecular breast imaging is benign  Screening mammogram in December

## 2024-04-29 ENCOUNTER — OFFICE VISIT (OUTPATIENT)
Dept: SURGERY | Facility: CLINIC | Age: 49
End: 2024-04-29
Payer: COMMERCIAL

## 2024-04-29 VITALS
HEART RATE: 80 BPM | DIASTOLIC BLOOD PRESSURE: 72 MMHG | SYSTOLIC BLOOD PRESSURE: 110 MMHG | BODY MASS INDEX: 22.5 KG/M2 | HEIGHT: 66 IN | WEIGHT: 140 LBS

## 2024-04-29 DIAGNOSIS — Z98.890 S/P CERVICAL DISC REPLACEMENT: Primary | ICD-10-CM

## 2024-04-29 PROCEDURE — 99024 POSTOP FOLLOW-UP VISIT: CPT | Performed by: NEUROLOGICAL SURGERY

## 2024-04-29 RX ORDER — DIAZEPAM 5 MG/1
5 TABLET ORAL EVERY 8 HOURS PRN
Qty: 45 TABLET | Refills: 0 | Status: SHIPPED | OUTPATIENT
Start: 2024-04-29

## 2024-04-29 NOTE — PROGRESS NOTES
S: Patient seen and examined.  Doing well.  Denies any new symptoms.  States she is better than before but has muscle weakness.    O: AVSS   Neuro: stable    A/P s/p ADR  -refill Diazepam  -start PT  -F/U in 2 months

## 2024-04-29 NOTE — PATIENT INSTRUCTIONS
Refill policies:    Allow 2-3 business days for refills; controlled substances may take longer.  Contact your pharmacy at least 5 days prior to running out of medication and have them send an electronic request or submit request through the “request refill” option in your [employer] account.  Refills are not addressed on weekends; covering physicians do not authorize routine medications on weekends.  No narcotics or controlled substances are refilled after noon on Fridays or by on call physicians.  By law, narcotics must be electronically prescribed.  A 30 day supply with no refills is the maximum allowed.  If your prescription is due for a refill, you may be due for a follow up appointment.  To best provide you care, patients receiving routine medications need to be seen at least once a year.  Patients receiving narcotic/controlled substance medications need to be seen at least once every 3 months.  In the event that your preferred pharmacy does not have the requested medication in stock (e.g. Backordered), it is your responsibility to find another pharmacy that has the requested medication available.  We will gladly send a new prescription to that pharmacy at your request.    Scheduling Tests:    If your physician has ordered radiology tests such as MRI or CT scans, please contact Central Scheduling at [phone] right away to schedule the test.  Once scheduled, the [facility] Centralized Referral Team will work with your insurance carrier to obtain pre-certification or prior authorization.  Depending on your insurance carrier, approval may take 3-10 days.  It is highly recommended patients assure they have received an authorization before having a test performed.  If test is done without insurance authorization, patient may be responsible for the entire amount billed.      Precertification and Prior Authorizations:  If your physician has recommended that you have a procedure or additional testing performed the [facility]  Centralized Referral Team will contact your insurance carrier to obtain pre-certification or prior authorization.    You are strongly encouraged to contact your insurance carrier to verify that your procedure/test has been approved and is a COVERED benefit.  Although the [facility] Centralized Referral Team does its due diligence, the insurance carrier gives the disclaimer that \"Although the procedure is authorized, this does not guarantee payment.\"    Ultimately the patient is responsible for payment.   Thank you for your understanding in this matter.  Paperwork Completion:  If you require FMLA or disability paperwork for your recovery, please make sure to either drop it off or have it faxed to our office at [phone]. Be sure the form has your name and date of birth on it.  The form will be faxed to our Forms Department and they will complete it for you.  There is a 25$ fee for all forms that need to be filled out.  Please be aware there is a 10-14 day turnaround time.  You will need to sign a release of information (NGA) form if your paperwork does not come with one.  You may call the Forms Department with any questions at [phone].  Their fax number is [phone].

## 2024-04-29 NOTE — PROGRESS NOTES
Established patient:  Reason for follow up: 4 week post op   anterior cervical discectomy at cervical 6-cervical 7 with placement of arthroplasty device Right       Numeric Rating Scale: (No Pain) 0  to  10 (Worst Pain)        Pain at Present:  0/10       Distribution of Pain:    right    Most recent treatments for Current Pain Condition:   Surgery  Response to treatment: complete resolution    New imaging or testing since your last office visit:

## 2024-05-10 ENCOUNTER — HOSPITAL ENCOUNTER (OUTPATIENT)
Dept: GENERAL RADIOLOGY | Age: 49
Discharge: HOME | End: 2024-05-10
Attending: NEUROLOGICAL SURGERY
Payer: COMMERCIAL

## 2024-05-10 DIAGNOSIS — Z98.890 S/P CERVICAL DISC REPLACEMENT: ICD-10-CM

## 2024-05-10 PROCEDURE — 72052 X-RAY EXAM NECK SPINE 6/>VWS: CPT | Performed by: NEUROLOGICAL SURGERY

## 2024-06-10 ENCOUNTER — OFFICE VISIT (OUTPATIENT)
Dept: OBGYN CLINIC | Facility: CLINIC | Age: 49
End: 2024-06-10
Payer: COMMERCIAL

## 2024-06-10 VITALS
WEIGHT: 142 LBS | HEART RATE: 91 BPM | DIASTOLIC BLOOD PRESSURE: 62 MMHG | BODY MASS INDEX: 23 KG/M2 | SYSTOLIC BLOOD PRESSURE: 108 MMHG

## 2024-06-10 DIAGNOSIS — Z01.812 PRE-PROCEDURAL LABORATORY EXAMINATION: ICD-10-CM

## 2024-06-10 DIAGNOSIS — N92.1 MENORRHAGIA WITH IRREGULAR CYCLE: ICD-10-CM

## 2024-06-10 DIAGNOSIS — N93.9 ABNORMAL UTERINE BLEEDING (AUB): Primary | ICD-10-CM

## 2024-06-10 LAB
CONTROL LINE PRESENT WITH A CLEAR BACKGROUND (YES/NO): YES YES/NO
KIT LOT #: NORMAL NUMERIC
PREGNANCY TEST, URINE: NEGATIVE

## 2024-06-10 PROCEDURE — 99215 OFFICE O/P EST HI 40 MIN: CPT | Performed by: OBSTETRICS & GYNECOLOGY

## 2024-06-10 PROCEDURE — 88305 TISSUE EXAM BY PATHOLOGIST: CPT | Performed by: OBSTETRICS & GYNECOLOGY

## 2024-06-10 PROCEDURE — 81025 URINE PREGNANCY TEST: CPT | Performed by: OBSTETRICS & GYNECOLOGY

## 2024-06-10 RX ORDER — SEGESTERONE ACETATE AND ETHINYL ESTRADIOL 103; 17.4 MG/1; MG/1
1 RING VAGINAL
Qty: 1 EACH | Refills: 1 | Status: SHIPPED | OUTPATIENT
Start: 2024-06-10 | End: 2024-07-08

## 2024-06-10 NOTE — PROGRESS NOTES
[facility]  Obstetrics and Gynecology  Follow Up Progress Note    Subjective:     Vita Gillis is a 48 year old  female who was last seen in office 2024 for removal of Nexplanon and presents with c/o AUB. The patient reports AUB.  Patient had Nexplanon removed on 2024.  She was started on Micronor daily for contraception. Reports hx of AUB with dysmenorrhea. She reports she had tried OCP and IUD. She had IUD removed due to persistent discomfort. She reports she had Nexplanon placed and began to have irregular bleeding begning the 2nd year and worsened over the third year of the device. She reports her menses have been longer and heavy, now worse since Nexplanon removed. She reports gushes and saturates through her clothing. She might have 7 days per month without bleeding. She reports bleeding is about 6-7 days of heavy with days of light bleeding. She does not want further children and  has a vasectomy. Denies previous EMB. Migraines but no aura. Denies VTE/PE or tobacco use.     Review of Systems:  General:  denies fevers, chills, fatigue and malaise.   Respiratory:  denies SOB, dyspnea, cough or wheezing  Cardiovascular:  denies chest pain, palpitations, exercise intolerance   GI: denies abdominal pain, diarrhea, constipation  :  denies dysuria, hematuria, increased urinary frequency.     OB History    Para Term  AB Living   3 2 2   1 2   SAB IAB Ectopic Multiple Live Births   1       2      # Outcome Date GA Lbr Wil/2nd Weight Sex Type Anes PTL Lv   3 Term 05 40w0d  10 lb 1 oz (4.564 kg) M NORMAL SPONT   VIK   2 Term 10/21/98 40w0d  8 lb 11 oz (3.941 kg) M NORMAL SPONT   VIK   1 SAB  13w0d                Gyne History:  Menarche: 15  Period Cycle (Days): irregular  Period Duration (Days): 10 days  Period Flow: heavy  Use of Birth Control (if yes, specify type): OCP  Hx Prior Abnormal Pap: Yes  Pap Date: 23  Pap Result Notes: WNL  Patient's  last menstrual period was 06/03/2024 (exact date).      Meds:  Current Outpatient Medications on File Prior to Visit   Medication Sig Dispense Refill    diazePAM 5 MG Oral Tab Take 1 tablet (5 mg total) by mouth every 8 (eight) hours as needed (to be given if spasms not relieved by methocarbamol). 45 tablet 0    lurasidone 40 MG Oral Tab Take 1 tablet (40 mg total) by mouth daily with food. 30 tablet 2    lamoTRIgine 150 MG Oral Tab Take 1 tablet (150 mg total) by mouth 2 (two) times daily. 180 tablet 0    cloNIDine 0.1 MG Oral Tab Take 1 tablet (0.1 mg total) by mouth 2 (two) times daily. 180 tablet 0    omeprazole 20 MG Oral Capsule Delayed Release Take 1 capsule (20 mg total) by mouth 2 (two) times daily as needed.      semaglutide 2 MG/1.5ML Subcutaneous Solution Pen-injector Inject 0.25 mg into the skin once a week. For weightloss  Sundays      desvenlafaxine  MG Oral Tablet 24 Hr Take 1 tablet (100 mg total) by mouth daily. 30 tablet 2    clobetasol 0.05 % External Solution APPLY TO THE AFFECTED AREA OF SCALP TWICE DAILY TO ITCHY AREAS      hydrocortisone 2.5 % External Cream Apply topically 2 (two) times daily as needed.      ketoconazole 2 % External Cream Apply topically daily as needed.      ketoconazole 2 % External Shampoo Apply 1 Application topically twice a week.      SUMAtriptan Succinate 100 MG Oral Tab TAKE 1 TABLET BY MOUTH WITHIN 30 MINUTES OF HEADACHE ONSET 9 tablet 0     No current facility-administered medications on file prior to visit.       All:  No Known Allergies    PMH:  Past Medical History:    Abdominal distention    Abdominal pain    Allergic rhinitis    Anxiety state, unspecified    Back pain    Back problem    Bad breath    Belching    Bloating    Blood in the stool    Constipation    Depression    Dysmenorrhea    Esophageal reflux    Frequent use of laxatives    Heartburn    Hemorrhoids    High cholesterol    History of depression    History of stomach ulcers    Hx of motion  sickness    Indigestion    Irregular bowel habits    Loss of appetite    Migraine    Migraines    Nausea    Obesity    Other and unspecified hyperlipidemia    Pain with bowel movements    Problems with swallowing    history of    Sleep disturbance    Stress    Uncomfortable fullness after meals    Weight gain    Weight loss       PSH:  Past Surgical History:   Procedure Laterality Date    Bunionectomy Bilateral     Colonoscopy      Colposcopy, cervix w/upper adjacent vagina; w/biopsy(s), cervix  2008    HPV+    Fracture surgery      repair of nasal fracture    Gastro - dmg  08/2016    healed ulcers    Other      wisdom teeth extraction    Other surgical history      nose repair 2014    Other surgical history Right 03/28/2024    anterior cervical discectomy at cervical 6-cervical 7 with placement of arthroplasty device    Up gi endoscopy,ball dil,30mm N/A 08/09/2016    Procedure: ESOPHAGOGASTRODUODENOSCOPY, POSSIBLE BIOPSY, POSSIBLE POLYPECTOMY 66716;  Surgeon: Sukh Samayoa MD;  Location: [facility]    Upper gi endo no barretts  05/2016    esophageal ulcer; HH    Upper gi endoscopy,biopsy N/A 05/17/2016    Procedure: ESOPHAGOGASTRODUODENOSCOPY, POSSIBLE BIOPSY, POSSIBLE POLYPECTOMY 94533;  Surgeon: Sukh Samayoa MD;  Location: [facility]         Objective:     Vitals:    06/10/24 1558   BP: 108/62   Pulse: 91   Weight: 142 lb (64.4 kg)         Body mass index is 22.92 kg/m².    General: AAO.NAD.   CVS exam: normal peripheral perfusion  Chest: non-labored breathing, no tachypnea   Abdominal exam: soft, nontender, nondistended  Pelvic exam:   VULVA: normal appearing vulva with no masses, tenderness or lesions  PERINEUM:  normal appearing, no lesions   URETHRAL MEATUS:  normal appearing, no lesions   VAGINA: normal appearing vagina with normal color and discharge, no lesions  CERVIX: normal appearing cervix without discharge or lesions  UTERUS: uterus is normal size, shape, consistency and  nontender  ADNEXA: normal adnexa in size, nontender and no masses  PERIRECTAL:  normal appearing, no lesions   Ext: non-tender, no edema    Labs:    Imaging:  Ordered        Assessment:     Vita Gillis is a 48 year old  female who presents for AUB        Plan:     Problem List Items Addressed This Visit          Genitourinary and Reproductive    Abnormal uterine bleeding (AUB) - Primary    Relevant Medications    Segesterone-Ethinyl Estradiol (ANNOVERA) 0.15-0.013 MG/24HR Vaginal Ring    Other Relevant Orders    Specimen to pathology , tissue (Completed)    Pelvic US Complete GYNE Only [[id number]]    Menorrhagia with irregular cycle    Relevant Medications    Segesterone-Ethinyl Estradiol (ANNOVERA) 0.15-0.013 MG/24HR Vaginal Ring    Other Relevant Orders    Specimen to pathology , tissue (Completed)    Pelvic US Complete GYNE Only [[id number]]     Other Visit Diagnoses       Pre-procedural laboratory examination        Relevant Orders    Urine Preg Test [[id number]] (Completed)              AUB  -labs reviewed   -pelvic US ordered in office  - recommend EMB, refer to procedure note    -d/w patient management options including conservative versus medical versus surgical including risks, benefits and alternatives  -d/w patient medical options including OCP/POP, ring, patch, Depo Provera, Mirena IUD, Lysteda, and GNRH agonist/antagonist   -d/w patient surgical options including endometrial ablation, UAE and Hysterectomy with bilateral salpingectomy  -after further discussion, patient requested Annovera   - new Rx with instructions provided  - AUB precautions provided     All of the findings and plan were discussed with the patient.  She notes understanding and agrees with the plan of care.  All questions were answered to the best of my ability at this time.      Total patient time was 40 minutes in evaluation, consultation, and coordination of care. This included face to face and non-face to face  actions. The patient's questions and concerns were addressed. This was a separate E/M service.       RTC in 12/2024 for well woman exam or sooner if needed     Jolanta Jin MD   [facility] - OBGYN       Discussed with patient that there will not be further notification of normal or benign results other than receiving results on mychart. A mychart message or telephone call will be placed by the physician and/or office staff if results are abnormal.     Note to patient and family   The 21st Century Cures Act makes medical notes available to patients in the interest of transparency.  However, please be advised that this is a medical document.  It is intended as bloy-bw-vzrh communication.  It is written and medical language may contain abbreviations or verbiage that are technical and unfamiliar.  It may appear blunt or direct.  Medical documents are intended to carry relevant information, facts as evident, and the clinical opinion of the practitioner.        This note could include assistance by Dragon voice recognition. Errors in content may be related to improper recognition by the system; efforts to review and correct have been done but errors may still exist.

## 2024-06-10 NOTE — PATIENT INSTRUCTIONS
[facility] Department of OB/GYN  After Care Instructions for Endometrial Biopsy      Biopsy Results   You will receive a phone call with your biopsy results in 7 business days.  If you have not received your results in 7 days, please contact our office.  The results of your biopsy will determine if further treatment will be necessary.    Bleeding   You may have some light bleeding or blackish clumpy discharge for several days after your biopsy.    Restrictions    You should avoid intercourse or tampon use for 1 day after your biopsy.    Pain    You may experience mild menstrual cramping after your biopsy.  You may use Ibuprofen, Aleve or Tylenol to relieve your discomfort.  If you experience severe or persistent pain contact our office.      If you have any additional questions, please call us at [phone].

## 2024-06-14 NOTE — PROCEDURES
Procedure: Endometrial biopsy     Date of Procedure: 24    Pre-procedure diagnosis:   AUB    Post-procedure diagnosis:    AUB    Indications:   48 year old female  who presents for endometrial biopsy  AUB    Procedure details:  The procedure, risks, benefits and alternatives were discussed with the patient. The patient was informed of risks including but not limited to the risk of bleeding, infection, injury and insufficient tissue collection. All questions and concerns were addressed. The patient provided verbal and written consent.     The patient was placed in a supine position with feet positioned into stirrups. A sterile speculum was placed into the vagina and the cervix was visualized with findings noted below. The cervix was cleaned and prepped with betadine. Lidocaine gel followed by an Samantha was placed on the anterior lip of the cervix. The endometrial Pipelle was then advanced through the cervical canal. The uterus sounded to 8 cm. The Pipelle was then engaged with suction force noted and advance in various angles along the uterine cavity. A small amount of endometrial tissue was noted and collected to be sent to pathology. This was repeated for 1 more pass. The Samantha was removed. Good hemostasis noted. The patient tolerated the procedure well. The patient was advised to return as needed.     Findings:   Normal cervix, no lesions   Normal uterus, sounded to 8 cm   Small amount of endometrial tissue   S/p EMB  Good hemostasis     Disposition: Stable    Complications: None    Follow up:  as needed     Jolanta Jin MD   [facility] - OBGYN      Discussed with patient that there will not be further notification of normal or benign results other than receiving results on mychart. A [employer] message or telephone call will be placed by the physician and/or office staff if results are abnormal.     Note to patient and family   The 21st Century Cures Act makes medical notes available to patients in the  interest of transparency.  However, please be advised that this is a medical document.  It is intended as [id number] communication.  It is written and medical language may contain abbreviations or verbiage that are technical and unfamiliar.  It may appear blunt or direct.  Medical documents are intended to carry relevant information, facts as evident, and the clinical opinion of the practitioner.      This note could include assistance by Dragon voice recognition. Errors in content may be related to improper recognition by the system; efforts to review and correct have been done but errors may still exist.

## 2024-06-24 ENCOUNTER — PATIENT MESSAGE (OUTPATIENT)
Dept: OBGYN CLINIC | Facility: CLINIC | Age: 49
End: 2024-06-24

## 2024-06-24 DIAGNOSIS — N93.9 ABNORMAL UTERINE BLEEDING (AUB): Primary | ICD-10-CM

## 2024-06-24 RX ORDER — NORETHINDRONE ACETATE AND ETHINYL ESTRADIOL .03; 1.5 MG/1; MG/1
1 TABLET ORAL DAILY
Qty: 112 TABLET | Refills: 3 | Status: SHIPPED | OUTPATIENT
Start: 2024-06-24 | End: 2025-06-24

## 2024-06-24 NOTE — TELEPHONE ENCOUNTER
New prescription for Junel 1.5/30 provided and written as continuous therapy in order to potentially stop the patient menstrual cycle.  The patient may elect to start her medication today.  I would advise just to skip the Sunday dose and start with Monday.   General No

## 2024-06-24 NOTE — TELEPHONE ENCOUNTER
From: Vita Gillis  To: Jolanta Jin  Sent: 6/24/2024 9:35 AM CDT  Subject: Birth control    I have tried this Annovera ring and have decided I would rather take the pill instead. We talked about using a different pill besides the all estrogen one I was on. Is there a pill that would limit my period to bare minimum? Could you please prescribe something and can I start it right away or do I have to wait until a Sunday ti start?  Thank you!  Vita Gillis

## 2024-07-23 ENCOUNTER — TELEPHONE (OUTPATIENT)
Dept: FAMILY MEDICINE CLINIC | Facility: CLINIC | Age: 49
End: 2024-07-23

## 2024-07-23 NOTE — TELEPHONE ENCOUNTER
Spoke with patient - started having pain to mid right side of back 2 days ago.  Now has rash - describes as \"painful\"  Denies fever  Only having fatigue.    Future Appointments   Date Time Provider Department Center   7/24/2024 10:00 AM Elisabeth Sandhu APRN [facility] [place]   9/18/2024  1:00 PM Joby Mcclure APRN [facility]      Explained can go in to Immediate care.  Patient prefers to keep appointment with SHELL Barber tomorrow.

## 2024-07-23 NOTE — TELEPHONE ENCOUNTER
Patient scheduled for a rash that hurts to the touch. Per patient it stated on Sunday. Other then the rash just feeling tired. Scheduled with john in [place] send to triage .   Future Appointments   Date Time Provider Department Center   7/24/2024 10:00 AM John Sandhu APRN [facility]   7/29/2024 11:20 AM Cary Bacon MD [facility] [place]   9/18/2024  1:00 PM Joby Mcclure APRN [facility]

## 2024-07-24 ENCOUNTER — TELEPHONE (OUTPATIENT)
Dept: FAMILY MEDICINE CLINIC | Facility: CLINIC | Age: 49
End: 2024-07-24

## 2024-07-24 ENCOUNTER — OFFICE VISIT (OUTPATIENT)
Dept: FAMILY MEDICINE CLINIC | Facility: CLINIC | Age: 49
End: 2024-07-24
Payer: COMMERCIAL

## 2024-07-24 VITALS
BODY MASS INDEX: 22.34 KG/M2 | TEMPERATURE: 98 F | SYSTOLIC BLOOD PRESSURE: 100 MMHG | HEART RATE: 73 BPM | RESPIRATION RATE: 16 BRPM | HEIGHT: 66 IN | WEIGHT: 139 LBS | DIASTOLIC BLOOD PRESSURE: 68 MMHG

## 2024-07-24 DIAGNOSIS — B02.9 HERPES ZOSTER WITHOUT COMPLICATION: ICD-10-CM

## 2024-07-24 DIAGNOSIS — B02.9 HERPES ZOSTER WITHOUT COMPLICATION: Primary | ICD-10-CM

## 2024-07-24 PROCEDURE — 99213 OFFICE O/P EST LOW 20 MIN: CPT | Performed by: NURSE PRACTITIONER

## 2024-07-24 RX ORDER — VALACYCLOVIR HYDROCHLORIDE 1 G/1
1000 TABLET, FILM COATED ORAL EVERY 12 HOURS SCHEDULED
Qty: 14 TABLET | Refills: 1 | Status: SHIPPED | OUTPATIENT
Start: 2024-07-24 | End: 2024-07-31

## 2024-07-24 RX ORDER — GABAPENTIN 300 MG/1
300 CAPSULE ORAL 2 TIMES DAILY
Qty: 14 CAPSULE | Refills: 0 | Status: SHIPPED | OUTPATIENT
Start: 2024-07-24 | End: 2024-07-24

## 2024-07-24 RX ORDER — GABAPENTIN 300 MG/1
300 CAPSULE ORAL 2 TIMES DAILY
Qty: 14 CAPSULE | Refills: 0 | OUTPATIENT
Start: 2024-07-24 | End: 2024-07-31

## 2024-07-24 RX ORDER — GABAPENTIN 300 MG/1
300 CAPSULE ORAL 2 TIMES DAILY
Qty: 14 CAPSULE | Refills: 0 | Status: SHIPPED | OUTPATIENT
Start: 2024-07-24 | End: 2024-07-25

## 2024-07-24 NOTE — TELEPHONE ENCOUNTER
Patient called and states she was seen today by SHELL Sandhu in [place] for shingles since there was no availability in office this week. Per patient, she went to  rx at pharmacy and they told her they need clarification on directions for rx before patient can pick them up. Please advise.

## 2024-07-24 NOTE — TELEPHONE ENCOUNTER
Called [facility]  They needed to clarify sig for gabapentin    Route: Take 1 capsule (300 mg total) by mouth in the morning and 1 capsule (300 mg total) before bedtime. Do all this for 7 days. Begin by taking 300mg PO qd x1 day, then 300 mg PO bid. - Oral     Please advise

## 2024-07-24 NOTE — PROGRESS NOTES
CHIEF COMPLAINT:    Chief Complaint   Patient presents with    Shingles     Rash on trunk and back, burns  Started: Sunday       HISTORY OF PRESENT ILLNESS:    Vita Dillon) presents today, July 24, 2024, for one acute problem.    Lorie presents today for rash to right side described as sensitive that began 3 days ago.   Skin was initially sensitive, then broke out into a rash, experiencing burning pain and \"zaps.\"  Positive for increased stress, son is attending college, and chills.  Denies fever, upper respiratory symptoms such as cough or sore throat.    ALLERGIES:  No Known Allergies    CURRENT MEDICATIONS:  Current Outpatient Medications   Medication Sig Dispense Refill    lurasidone 40 MG Oral Tab Take 1 tablet (40 mg total) by mouth daily with food. 90 tablet 0    CLONIDINE 0.1 MG Oral Tab TAKE 1 TABLET(0.1 MG) BY MOUTH TWICE DAILY 180 tablet 0    LAMOTRIGINE 150 MG Oral Tab TAKE 1 TABLET(150 MG) BY MOUTH TWICE DAILY 180 tablet 0    Norethindrone Acet-Ethinyl Est (JUNEL 1.5/30) 1.5-30 MG-MCG Oral Tab Take 1 tablet by mouth daily. Take one active pill daily for continuous therapy and skip placebo week. 112 tablet 3    desvenlafaxine  MG Oral Tablet 24 Hr Take 1 tablet (100 mg total) by mouth daily. 30 tablet 2    diazePAM 5 MG Oral Tab Take 1 tablet (5 mg total) by mouth every 8 (eight) hours as needed (to be given if spasms not relieved by methocarbamol). 45 tablet 0    omeprazole 20 MG Oral Capsule Delayed Release Take 1 capsule (20 mg total) by mouth 2 (two) times daily as needed.      semaglutide 2 MG/1.5ML Subcutaneous Solution Pen-injector Inject 0.25 mg into the skin once a week. For weightloss  Sundays      clobetasol 0.05 % External Solution APPLY TO THE AFFECTED AREA OF SCALP TWICE DAILY TO ITCHY AREAS      hydrocortisone 2.5 % External Cream Apply topically 2 (two) times daily as needed.      ketoconazole 2 % External Cream Apply topically daily as needed.      ketoconazole 2 % External  Shampoo Apply 1 Application topically twice a week.      SUMAtriptan Succinate 100 MG Oral Tab TAKE 1 TABLET BY MOUTH WITHIN 30 MINUTES OF HEADACHE ONSET 9 tablet 0       MEDICAL HISTORY:  Past Medical History:    Abdominal distention    Abdominal pain    Allergic rhinitis    Anxiety state, unspecified    Back pain    Back problem    Bad breath    Belching    Bloating    Blood in the stool    Constipation    Depression    Dysmenorrhea    Esophageal reflux    Frequent use of laxatives    Heartburn    Hemorrhoids    High cholesterol    History of depression    History of stomach ulcers    Hx of motion sickness    Indigestion    Irregular bowel habits    Loss of appetite    Migraine    Migraines    Nausea    Obesity    Other and unspecified hyperlipidemia    Pain with bowel movements    Problems with swallowing    history of    Sleep disturbance    Stress    Uncomfortable fullness after meals    Weight gain    Weight loss     Past Surgical History:   Procedure Laterality Date    Bunionectomy Bilateral     Colonoscopy      Colposcopy, cervix w/upper adjacent vagina; w/biopsy(s), cervix  2008    HPV+    Fracture surgery      repair of nasal fracture    Gastro - dmg  08/2016    healed ulcers    Other      wisdom teeth extraction    Other surgical history      nose repair 2014    Other surgical history Right 03/28/2024    anterior cervical discectomy at cervical 6-cervical 7 with placement of arthroplasty device    Up gi endoscopy,ball dil,30mm N/A 08/09/2016    Procedure: ESOPHAGOGASTRODUODENOSCOPY, POSSIBLE BIOPSY, POSSIBLE POLYPECTOMY 10447;  Surgeon: Sukh Samayoa MD;  Location: [facility]    Upper gi endo no barretts  05/2016    esophageal ulcer; HH    Upper gi endoscopy,biopsy N/A 05/17/2016    Procedure: ESOPHAGOGASTRODUODENOSCOPY, POSSIBLE BIOPSY, POSSIBLE POLYPECTOMY 24812;  Surgeon: Sukh Samayoa MD;  Location: [facility]     Family History   Problem Relation Age of Onset    Alcohol and Other  Disorders Associated Father     Homicide History Father         father strangled mother, patient age 8    Psychiatric Sister         postpartum depression    Depression Son     Other (Other) Son         Duchennes muscular dystrophy    Cancer Maternal Grandmother         lung, brain    Breast Cancer Maternal Grandmother 52    Diabetes Maternal Grandmother     Stroke Maternal Grandmother     Anxiety Maternal Grandmother         undiagnosed    Cancer Maternal Grandfather     Stroke Maternal Grandfather     Lipids Maternal Grandfather     Diabetes Maternal Grandfather     Heart Attack Maternal Grandfather     Diabetes Paternal Grandmother     Depression Paternal Grandmother     Diabetes Paternal Grandfather     Crohn's Disease Maternal Aunt     Breast Cancer Maternal Aunt 62    Depression Other         pat uncle    Suicide History Other         pat uncle completed     Family Status   Relation Status    Mo     Fa Alive    Sis (Not Specified)    Son (Not Specified)    MGMA (Not Specified)    MGFA (Not Specified)    PGMA (Not Specified)    PGFA (Not Specified)    Mat Aunt (Not Specified)    Other (Not Specified)     Social History     Socioeconomic History    Marital status:    Tobacco Use    Smoking status: Former     Current packs/day: 0.00     Average packs/day: 1 pack/day for 18.0 years (18.0 ttl pk-yrs)     Types: Cigarettes     Start date: 1990     Quit date: 2008     Years since quittin.5    Smokeless tobacco: Never    Tobacco comments:     non-smoker   Vaping Use    Vaping status: Never Used   Substance and Sexual Activity    Alcohol use: Yes     Comment: socially    Drug use: Never    Sexual activity: Yes     Partners: Male     Birth control/protection: Vasectomy, OCP   Other Topics Concern    Caffeine Concern Yes     Comment: 1 cup of coffee x day    Exercise Yes     Comment: 2 x week, cardio    Seat Belt Yes     Social Determinants of Health     Food Insecurity: No Food Insecurity  (3/28/2024)    Food Insecurity     Food Insecurity: Never true   Transportation Needs: No Transportation Needs (3/28/2024)    Transportation Needs     Lack of Transportation: No    [facility]    Social Connections   Housing Stability: Low Risk  (3/28/2024)    Housing Stability     Housing Instability: No       ROS:  GENERAL:  Denies recorded temperatures greater than 100.5F  RESPIRATORY:  Denies difficulty breathing  CARDIAC:  Denies chest pain with exertion    VITALS:   /68   Pulse 73   Temp 98.3 °F (36.8 °C) (Temporal)   Resp 16   Ht 5' 6\" (1.676 m)   Wt 139 lb (63 kg)   LMP 06/03/2024 (Exact Date)   BMI 22.44 kg/m²    Reviewed by Elisabeth Sandhu MS, APRN, FNP-BC    PHYSICAL EXAM:    Constitutional:       Appears well.  Sitting upright on exam table.  Well developed, well nourished, and in no acute distress  HEENT:      Facial features symmetric. Normocephalic and atraumatic  Musculoskeletal:         Movements smooth and controlled with appropriate coordination.       Gait is steady, nonantalgic.  Neuro:       No focal deficits, cranial nerves grossly intact.       Movements smooth and controlled, appropriate coordination without ataxia or tremors.  Skin:     Erythematous patch with cluster of vesicles to right thoracic region, to the right of thoracic spine, approximately T9-T0.  Psychiatric:         Alert and oriented.  Calm and cooperative.  Speech is clear.     ASSESSMENT & PLAN:    1. Herpes zoster without complication  - valACYclovir 1 G Oral Tab; Take 1 tablet (1,000 mg total) by mouth every 12 (twelve) hours for 7 days. May extend by 7 days if new lesions are developing.  Dispense: 14 tablet; Refill: 1  - gabapentin 300 MG Oral Cap; Take 1 capsule (300 mg total) by mouth in the morning and 1 capsule (300 mg total) before bedtime. Do all this for 7 days. Begin by taking 300mg PO qd x1 day, then 300 mg PO bid.  Dispense: 14  capsule; Refill: 0    Follow-up in 1 week if failure to improve

## 2024-07-25 RX ORDER — GABAPENTIN 300 MG/1
300 CAPSULE ORAL 2 TIMES DAILY
Qty: 14 CAPSULE | Refills: 0 | Status: SHIPPED | OUTPATIENT
Start: 2024-07-25 | End: 2024-08-01

## 2024-09-12 ENCOUNTER — OFFICE VISIT (OUTPATIENT)
Dept: FAMILY MEDICINE CLINIC | Facility: CLINIC | Age: 49
End: 2024-09-12
Payer: COMMERCIAL

## 2024-09-12 VITALS
BODY MASS INDEX: 22 KG/M2 | WEIGHT: 135 LBS | HEART RATE: 86 BPM | TEMPERATURE: 99 F | OXYGEN SATURATION: 99 % | SYSTOLIC BLOOD PRESSURE: 108 MMHG | DIASTOLIC BLOOD PRESSURE: 66 MMHG

## 2024-09-12 DIAGNOSIS — B37.2 CUTANEOUS CANDIDIASIS: ICD-10-CM

## 2024-09-12 DIAGNOSIS — L30.9 CHRONIC DERMATITIS: Primary | ICD-10-CM

## 2024-09-12 PROCEDURE — 99213 OFFICE O/P EST LOW 20 MIN: CPT | Performed by: NURSE PRACTITIONER

## 2024-09-12 RX ORDER — FLUCONAZOLE 100 MG/1
100 TABLET ORAL DAILY
Qty: 7 TABLET | Refills: 0 | Status: SHIPPED | OUTPATIENT
Start: 2024-09-12

## 2024-09-12 RX ORDER — PREDNISONE 20 MG/1
20 TABLET ORAL DAILY
Qty: 7 TABLET | Refills: 0 | Status: SHIPPED | OUTPATIENT
Start: 2024-09-12 | End: 2024-09-19

## 2024-09-12 NOTE — PROGRESS NOTES
HPI:     Patient is here for itching. Has been excessive itching on scalp and back for a couple of years. Seeing derm. Trying topical treatment and shampoo with no relief. Has various spots but they do not all look the same. Has new rash on buttocks that just appeared within last couple of weeks. Last saw derm 12/2023.    Current Outpatient Medications   Medication Sig Dispense Refill    fluconazole 100 MG Oral Tab Take 1 tablet (100 mg total) by mouth daily. 7 tablet 0    predniSONE 20 MG Oral Tab Take 1 tablet (20 mg total) by mouth daily for 7 days. 7 tablet 0    lurasidone 40 MG Oral Tab Take 1 tablet (40 mg total) by mouth daily with food. 90 tablet 0    CLONIDINE 0.1 MG Oral Tab TAKE 1 TABLET(0.1 MG) BY MOUTH TWICE DAILY 180 tablet 0    LAMOTRIGINE 150 MG Oral Tab TAKE 1 TABLET(150 MG) BY MOUTH TWICE DAILY 180 tablet 0    Norethindrone Acet-Ethinyl Est (JUNEL 1.5/30) 1.5-30 MG-MCG Oral Tab Take 1 tablet by mouth daily. Take one active pill daily for continuous therapy and skip placebo week. 112 tablet 3    desvenlafaxine  MG Oral Tablet 24 Hr Take 1 tablet (100 mg total) by mouth daily. 30 tablet 2    omeprazole 20 MG Oral Capsule Delayed Release Take 1 capsule (20 mg total) by mouth 2 (two) times daily as needed.      semaglutide 2 MG/1.5ML Subcutaneous Solution Pen-injector Inject 0.25 mg into the skin once a week. For weightloss  Sundays      clobetasol 0.05 % External Solution APPLY TO THE AFFECTED AREA OF SCALP TWICE DAILY TO ITCHY AREAS      hydrocortisone 2.5 % External Cream Apply topically 2 (two) times daily as needed.      ketoconazole 2 % External Cream Apply topically daily as needed.      ketoconazole 2 % External Shampoo Apply 1 Application topically twice a week.      SUMAtriptan Succinate 100 MG Oral Tab TAKE 1 TABLET BY MOUTH WITHIN 30 MINUTES OF HEADACHE ONSET 9 tablet 0      Past Medical History:    Abdominal distention    Abdominal pain    Allergic rhinitis    Anxiety state,  unspecified    Back pain    Back problem    Bad breath    Belching    Bloating    Blood in the stool    Constipation    Depression    Dysmenorrhea    Esophageal reflux    Frequent use of laxatives    Heartburn    Hemorrhoids    High cholesterol    History of depression    History of stomach ulcers    Hx of motion sickness    Indigestion    Irregular bowel habits    Loss of appetite    Migraine    Migraines    Nausea    Obesity    Other and unspecified hyperlipidemia    Pain with bowel movements    Problems with swallowing    history of    Sleep disturbance    Stress    Uncomfortable fullness after meals    Weight gain    Weight loss      Past Surgical History:   Procedure Laterality Date    Bunionectomy Bilateral     Colonoscopy      Colposcopy, cervix w/upper adjacent vagina; w/biopsy(s), cervix  2008    HPV+    Fracture surgery      repair of nasal fracture    Gastro - dmg  08/2016    healed ulcers    Other      wisdom teeth extraction    Other surgical history      nose repair 2014    Other surgical history Right 03/28/2024    anterior cervical discectomy at cervical 6-cervical 7 with placement of arthroplasty device    Up gi endoscopy,ball dil,30mm N/A 08/09/2016    Procedure: ESOPHAGOGASTRODUODENOSCOPY, POSSIBLE BIOPSY, POSSIBLE POLYPECTOMY 44616;  Surgeon: Sukh Samayoa MD;  Location: [facility]    Upper gi endo no barretts  05/2016    esophageal ulcer; HH    Upper gi endoscopy,biopsy N/A 05/17/2016    Procedure: ESOPHAGOGASTRODUODENOSCOPY, POSSIBLE BIOPSY, POSSIBLE POLYPECTOMY 22893;  Surgeon: Sukh Samayoa MD;  Location: [facility]      Family History   Problem Relation Age of Onset    Alcohol and Other Disorders Associated Father     Homicide History Father         father strangled mother, patient age 8    Psychiatric Sister         postpartum depression    Depression Son     Other (Other) Son         Duchennes muscular dystrophy    Cancer Maternal Grandmother         lung, brain     Breast Cancer Maternal Grandmother 52    Diabetes Maternal Grandmother     Stroke Maternal Grandmother     Anxiety Maternal Grandmother         undiagnosed    Cancer Maternal Grandfather     Stroke Maternal Grandfather     Lipids Maternal Grandfather     Diabetes Maternal Grandfather     Heart Attack Maternal Grandfather     Diabetes Paternal Grandmother     Depression Paternal Grandmother     Diabetes Paternal Grandfather     Crohn's Disease Maternal Aunt     Breast Cancer Maternal Aunt 62    Depression Other         pat uncle    Suicide History Other         pat uncle completed      Social History     Socioeconomic History    Marital status:    Tobacco Use    Smoking status: Former     Current packs/day: 0.00     Average packs/day: 1 pack/day for 18.0 years (18.0 ttl pk-yrs)     Types: Cigarettes     Start date: 1990     Quit date: 2008     Years since quittin.6    Smokeless tobacco: Never    Tobacco comments:     non-smoker   Vaping Use    Vaping status: Never Used   Substance and Sexual Activity    Alcohol use: Yes     Comment: socially    Drug use: Never    Sexual activity: Yes     Partners: Male     Birth control/protection: Vasectomy, OCP   Other Topics Concern    Caffeine Concern Yes     Comment: 1 cup of coffee x day    Exercise Yes     Comment: 2 x week, cardio    Seat Belt Yes     Social Determinants of Health     Food Insecurity: No Food Insecurity (3/28/2024)    Food Insecurity     Food Insecurity: Never true   Transportation Needs: No Transportation Needs (3/28/2024)    Transportation Needs     Lack of Transportation: No    [facility]    Social Connections   Housing Stability: Low Risk  (3/28/2024)    Housing Stability     Housing Instability: No         REVIEW OF SYSTEMS:   Review of Systems   Constitutional:  Negative for chills, fatigue, fever and unexpected weight change.   Respiratory:  Negative for cough.     Cardiovascular:  Negative for chest pain.   Skin:  Positive for rash.       EXAM:   /66   Pulse 86   Temp 98.6 °F (37 °C)   Wt 135 lb (61.2 kg)   LMP 06/03/2024 (Exact Date)   SpO2 99%   BMI 21.79 kg/m²   Physical Exam  Constitutional:       General: She is not in acute distress.     Appearance: Normal appearance. She is not ill-appearing.   Cardiovascular:      Rate and Rhythm: Normal rate and regular rhythm.      Heart sounds: Normal heart sounds.   Pulmonary:      Effort: Pulmonary effort is normal.      Breath sounds: Normal breath sounds.   Skin:         Neurological:      Mental Status: She is alert.         ASSESSMENT AND PLAN:   Diagnoses and all orders for this visit:    Chronic dermatitis  -     predniSONE 20 MG Oral Tab; Take 1 tablet (20 mg total) by mouth daily for 7 days.    Cutaneous candidiasis  -     fluconazole 100 MG Oral Tab; Take 1 tablet (100 mg total) by mouth daily.    Recommend follow up with derm

## 2024-09-23 NOTE — TELEPHONE ENCOUNTER
Pt is scheduled to have r foot surgery tomorrow with dr Khoi Vegas @ [facility] had her pre-op on 11/28/22,  Pt has to be seen within the 30 days of pre-op. Bernard Venegas called Dr Kiersten Barrios office today @ 3:30 to tell them that she will need another pre-op and they will not allow an extension. Is there anyway this pt can be seen today.    Dr Gaines Destin [phone] Detail Level: Detailed

## 2024-11-08 ENCOUNTER — TELEPHONE (OUTPATIENT)
Dept: ORTHOPEDICS CLINIC | Facility: CLINIC | Age: 49
End: 2024-11-08

## 2024-11-08 NOTE — TELEPHONE ENCOUNTER
Patient scheduled on [facility] for a follow up on tail bone pain with dr Gonzalez. Please advise if new imaging is needed  Future Appointments   Date Time Provider Department Center   11/18/2024  9:20 AM Jefferson Gonzalez,  [facility] [address]   11/20/2024  1:00 PM Joby Mcclure APRN [facility]

## 2024-11-18 ENCOUNTER — OFFICE VISIT (OUTPATIENT)
Facility: CLINIC | Age: 49
End: 2024-11-18
Payer: COMMERCIAL

## 2024-11-18 VITALS — WEIGHT: 135 LBS | HEIGHT: 66 IN | BODY MASS INDEX: 21.69 KG/M2

## 2024-11-18 DIAGNOSIS — M53.3 COCCYDYNIA: Primary | ICD-10-CM

## 2024-11-18 DIAGNOSIS — M43.17 SPONDYLOLISTHESIS OF LUMBOSACRAL REGION: ICD-10-CM

## 2024-11-18 PROCEDURE — 99214 OFFICE O/P EST MOD 30 MIN: CPT | Performed by: FAMILY MEDICINE

## 2024-11-18 RX ORDER — DIAZEPAM 5 MG/1
5 TABLET ORAL
Qty: 2 TABLET | Refills: 0 | Status: SHIPPED | OUTPATIENT
Start: 2024-11-18 | End: 2024-11-18

## 2024-11-18 NOTE — PROGRESS NOTES
Sports Medicine Clinic Note    Subjective:    Chief Complaint: Tailbone pain.    History: 48-year-old female presenting for follow-up regarding recurrent coccygeal pain. She reports significant improvement following a fluoroscopy-guided coccygeal injection performed by her pain management provider approximately 10 months ago. Over the past month, she has experienced a gradual return of dull, aching pain without an identifiable inciting event. Pain is persistent and most noticeable during prolonged sitting and while transitioning from sitting to standing. She denies trauma, recent changes in activity, or other new symptoms. The patient remains active, works as a  provider, and has trialed options such as using different mattresses and cushions without relief. She is interested in pursuing another injection and is willing to proceed with an office-based approach.    Objective:    Sacrococcygeal Examination:    Inspection: Gait and posture remain normal.  Palpation: Localized tenderness over the sacrococcygeal region.  Range of Motion: Moves all extremities well.  Neurovascular: Grossly intact.    Diagnostic Tests:     No new imaging performed today. MRI from the prior visit showed no significant abnormalities in the sacrococcygeal region. Lumbar spine findings included grade 1 anterolisthesis and bilateral spondylolysis at L5-S1, and grade 1 retrolisthesis at L4-5 with severe degenerative disc disease at L4-5.    Assessment:    Recurrent coccygeal pain. The pain appears mechanical in nature and localized to the sacrococcygeal region, likely related to prolonged sitting and postural stress. There is no evidence of acute pathology based on history, examination, and prior imaging.    Plan:    Procedures: Schedule an in-office sacrococcygeal injection with local anesthesia and mild sedation for symptom management.  Activity Modifications: Advise the use of a coccygeal cushion to offload pressure and minimize  sitting for prolonged periods. Recommend frequent positional changes throughout the day.  Therapy: Consider referral to physical therapy if symptoms persist or worsen, with focus on pelvic floor stabilization and postural optimization.  Medications: Continue NSAIDs or Tylenol as needed for pain relief.  Future Workup: No additional imaging is indicated at this time unless there is a change in symptoms.    Follow-Up: Tentatively scheduled for CSI and reassessment. Advise the patient to return earlier if symptoms worsen or new symptoms arise.      Jefferson Gonzalez DO, CAQSM   Primary Care Sports Medicine

## 2024-11-27 ENCOUNTER — OFFICE VISIT (OUTPATIENT)
Facility: CLINIC | Age: 49
End: 2024-11-27
Payer: COMMERCIAL

## 2024-11-27 VITALS — BODY MASS INDEX: 21.69 KG/M2 | HEIGHT: 66 IN | WEIGHT: 135 LBS

## 2024-11-27 DIAGNOSIS — M53.3 SACROCOCCYGEAL PAIN: ICD-10-CM

## 2024-11-27 DIAGNOSIS — M43.17 SPONDYLOLISTHESIS OF LUMBOSACRAL REGION: ICD-10-CM

## 2024-11-27 DIAGNOSIS — M51.369 DEGENERATION OF INTERVERTEBRAL DISC OF LUMBAR REGION, UNSPECIFIED WHETHER PAIN PRESENT: ICD-10-CM

## 2024-11-27 DIAGNOSIS — M53.3 COCCYDYNIA: Primary | ICD-10-CM

## 2024-11-29 RX ORDER — KETOROLAC TROMETHAMINE 30 MG/ML
30 INJECTION, SOLUTION INTRAMUSCULAR; INTRAVENOUS ONCE
Status: COMPLETED | OUTPATIENT
Start: 2024-11-29 | End: 2024-11-29

## 2024-11-29 RX ORDER — TRIAMCINOLONE ACETONIDE 40 MG/ML
40 INJECTION, SUSPENSION INTRA-ARTICULAR; INTRAMUSCULAR ONCE
Status: COMPLETED | OUTPATIENT
Start: 2024-11-29 | End: 2024-11-29

## 2024-11-29 RX ADMIN — KETOROLAC TROMETHAMINE 30 MG: 30 INJECTION, SOLUTION INTRAMUSCULAR; INTRAVENOUS at 12:32:00

## 2024-11-29 RX ADMIN — TRIAMCINOLONE ACETONIDE 40 MG: 40 INJECTION, SUSPENSION INTRA-ARTICULAR; INTRAMUSCULAR at 12:32:00

## 2024-12-02 NOTE — PROGRESS NOTES
Sports Medicine Clinic Note    Subjective:    Chief Complaint: Tailbone pain.      History: 48-year-old female returning for follow-up of recurrent coccygeal pain. She previously expressed interest in an in-office injection for symptom relief, as discussed during her last visit. She reports no significant change in her pain profile, which remains localized and exacerbated by prolonged sitting or transitioning from sitting to standing. She notes a history of significant weight loss over the past year, which she believes may have contributed to her symptoms, but her weight is now stable. She denies any new trauma, systemic symptoms, or changes in bowel or bladder function.      Objective:      Sacrococcygeal Examination:      Inspection: Gait remains normal, with no apparent postural abnormalities.    Palpation: Tenderness remains localized to the sacrococcygeal region.    Range of Motion: Unrestricted motion of the lower extremities without pain.    Neurovascular: Sensation and motor function grossly intact. Peripheral pulses palpable bilaterally.      Diagnostic Tests:      No new imaging performed today. Previous MRI findings were reviewed, showing no significant abnormalities of the sacrococcygeal region. Lumbar spine findings included grade 1 anterolisthesis and bilateral spondylolysis at L5-S1, as well as severe degenerative disc disease at L4-L5.      Assessment:      Recurrent coccygeal pain, likely mechanical in nature and exacerbated by postural stress and prolonged sitting. Contributing factors may include recent significant intentional weight loss in the past year.    Plan:      Procedures: Coccygeal corticosteroid injection performed today in the office under sterile conditions with local anesthesia. The patient tolerated the procedure well with no immediate complications.    Medications: Continue NSAIDs or Acetaminophen as needed for pain relief.    Activity Modifications: Continue use of coccygeal  cushion to offload pressure while seated. Advise frequent positional changes and avoidance of prolonged sitting.    Therapy: Physical therapy to be considered if symptoms persist, focusing on pelvic floor and core stabilization exercises.      Follow-Up: Tentatively scheduled in 3 to 4 weeks for reassessment of symptoms and response to injection. Advise earlier return if symptoms worsen or new concerns arise.      Ultrasound Guided Procedure Note:    After discussion of the risks and benefits, the patient elected to proceed with an ultrasound guided injection into the sacrococcygeal joint. Confirmed that the patient does not have history of prior adverse reactions, active infections, or relevant allergies. There was no erythema or warmth, and the skin was clear.     The skin was sterilized with ChloraPrep. A 25 gauge needle was inserted via distal approach utilizing US for needle guidance and placement. The site was injected with a mixture of 1 mL of triamcinolone 40 mg/mL, 1 mL of ketorolac 30 mg/mL and 1 mL of 1% Lidocaine without Epinephrine. The injection was completed without complication, and a bandage was applied. The patient tolerated the procedure well and was instructed to avoid strenuous activity for the next 24-48 hours and to use ice or Tylenol for pain as needed. The patient will call immediately with any signs of infection or allergic reaction.    Post-Injection Care: The patient tolerated the procedure well. An occlusive bandage was placed over the injection site. Post-injection care instructions provided to the patient. The patient was asked to avoid strenuous activity and continue to rest the area for 2-3 days before resuming regular activities. Patient advised that the area may be more painful for the first 1-2 days. They can use ice or Tylenol for pain as needed.  Patient was instructed to watch for fever, increased swelling, or persistent pain. The patient will call immediately with any signs of  infection or allergic reaction.      Jefferson Gonzalez DO, CAQSM   Primary Care Sports Medicine

## 2025-04-02 ENCOUNTER — TELEPHONE (OUTPATIENT)
Dept: FAMILY MEDICINE CLINIC | Facility: CLINIC | Age: 50
End: 2025-04-02

## 2025-04-02 ENCOUNTER — OFFICE VISIT (OUTPATIENT)
Dept: FAMILY MEDICINE CLINIC | Facility: CLINIC | Age: 50
End: 2025-04-02
Payer: COMMERCIAL

## 2025-04-02 VITALS
HEART RATE: 83 BPM | WEIGHT: 143 LBS | HEIGHT: 66 IN | SYSTOLIC BLOOD PRESSURE: 96 MMHG | OXYGEN SATURATION: 98 % | DIASTOLIC BLOOD PRESSURE: 56 MMHG | TEMPERATURE: 98 F | BODY MASS INDEX: 22.98 KG/M2

## 2025-04-02 DIAGNOSIS — F33.2 SEVERE EPISODE OF RECURRENT MAJOR DEPRESSIVE DISORDER, WITHOUT PSYCHOTIC FEATURES (HCC): ICD-10-CM

## 2025-04-02 DIAGNOSIS — E78.00 ELEVATED LDL CHOLESTEROL LEVEL: ICD-10-CM

## 2025-04-02 DIAGNOSIS — K21.9 GASTROESOPHAGEAL REFLUX DISEASE, UNSPECIFIED WHETHER ESOPHAGITIS PRESENT: ICD-10-CM

## 2025-04-02 DIAGNOSIS — K59.09 OTHER CONSTIPATION: ICD-10-CM

## 2025-04-02 DIAGNOSIS — E55.9 VITAMIN D DEFICIENCY: ICD-10-CM

## 2025-04-02 DIAGNOSIS — L20.84 INTRINSIC ECZEMA: ICD-10-CM

## 2025-04-02 DIAGNOSIS — N92.1 MENORRHAGIA WITH IRREGULAR CYCLE: ICD-10-CM

## 2025-04-02 DIAGNOSIS — F41.9 ANXIETY: ICD-10-CM

## 2025-04-02 DIAGNOSIS — Z12.31 ENCOUNTER FOR SCREENING MAMMOGRAM FOR MALIGNANT NEOPLASM OF BREAST: ICD-10-CM

## 2025-04-02 DIAGNOSIS — F39 MOOD DISORDER: ICD-10-CM

## 2025-04-02 DIAGNOSIS — Z00.00 GENERAL MEDICAL EXAM: Primary | ICD-10-CM

## 2025-04-02 PROBLEM — Z97.5 NEXPLANON IN PLACE: Status: RESOLVED | Noted: 2022-08-04 | Resolved: 2025-04-02

## 2025-04-02 PROCEDURE — 99396 PREV VISIT EST AGE 40-64: CPT | Performed by: NURSE PRACTITIONER

## 2025-04-02 RX ORDER — OMEPRAZOLE 20 MG/1
20 CAPSULE, DELAYED RELEASE ORAL
Qty: 180 CAPSULE | Refills: 1 | Status: SHIPPED | OUTPATIENT
Start: 2025-04-02 | End: 2025-04-02

## 2025-04-02 RX ORDER — DUPILUMAB 300 MG/2ML
INJECTION, SOLUTION SUBCUTANEOUS
COMMUNITY
Start: 2025-02-13

## 2025-04-02 RX ORDER — OMEPRAZOLE 20 MG/1
20 CAPSULE, DELAYED RELEASE ORAL
Qty: 180 CAPSULE | Refills: 1 | Status: SHIPPED | OUTPATIENT
Start: 2025-04-02 | End: 2025-04-07

## 2025-04-02 NOTE — PROGRESS NOTES
HPI:   Patient is here for physical.    Concerns: none    LMP: takes continuous OCP    Last Pap: 12/2023  Last Mammogram: 12/2023  Last Colon Cancer Screen: 11/2017, repeat in 2027    Exercise: walking the dog   Diet: balanced    Wt Readings from Last 6 Encounters:   04/02/25 143 lb (64.9 kg)   11/27/24 135 lb (61.2 kg)   11/18/24 135 lb (61.2 kg)   09/12/24 135 lb (61.2 kg)   07/24/24 139 lb (63 kg)   06/10/24 142 lb (64.4 kg)     Body mass index is 23.08 kg/m².     Cholesterol, Total (mg/dL)   Date Value   03/06/2024 194   10/09/2023 145   07/19/2022 163     HDL Cholesterol (mg/dL)   Date Value   03/06/2024 54   10/09/2023 46   07/19/2022 32 (L)     LDL Cholesterol (mg/dL)   Date Value   03/06/2024 125 (H)   10/09/2023 85   07/19/2022 99     AST (U/L)   Date Value   03/06/2024 18   10/09/2023 24   11/28/2022 19     ALT (U/L)   Date Value   03/06/2024 27   10/09/2023 37   11/28/2022 29        Current Outpatient Medications   Medication Sig Dispense Refill    omeprazole 20 MG Oral Capsule Delayed Release Take 1 capsule (20 mg total) by mouth 2 (two) times daily before meals. 180 capsule 1    DUPIXENT 300 MG/2ML Subcutaneous Solution Auto-injector       cloNIDine 0.1 MG Oral Tab Take 1 tablet (0.1 mg total) by mouth 2 (two) times daily. 180 tablet 0    desvenlafaxine  MG Oral Tablet 24 Hr Take 1 tablet (100 mg total) by mouth daily. 90 tablet 0    lamoTRIgine 150 MG Oral Tab Take 1 tablet (150 mg total) by mouth 2 (two) times daily. 180 tablet 0    ALPRAZolam 0.25 MG Oral Tab Take 1 tablet (0.25 mg total) by mouth daily as needed for Anxiety (flying). 6 tablet 0    Norethindrone Acet-Ethinyl Est (JUNEL 1.5/30) 1.5-30 MG-MCG Oral Tab Take 1 tablet by mouth daily. Take one active pill daily for continuous therapy and skip placebo week. 112 tablet 3    semaglutide 2 MG/1.5ML Subcutaneous Solution Pen-injector Inject 0.25 mg into the skin once a week. For weightloss  Sundays      SUMAtriptan Succinate 100 MG Oral  Tab TAKE 1 TABLET BY MOUTH WITHIN 30 MINUTES OF HEADACHE ONSET 9 tablet 0      Past Medical History:    Abdominal distention    Abdominal pain    Allergic rhinitis    Anxiety state, unspecified    Back pain    Back problem    Bad breath    Belching    Bloating    Blood in the stool    Constipation    Depression    Dysmenorrhea    Esophageal reflux    Frequent use of laxatives    Heartburn    Hemorrhoids    High cholesterol    History of depression    History of stomach ulcers    Hx of motion sickness    Indigestion    Irregular bowel habits    Loss of appetite    Migraine    Migraines    Nausea    Nexplanon in place    Placed 12/2020      Obesity    Other and unspecified hyperlipidemia    Pain with bowel movements    Problems with swallowing    history of    Sleep disturbance    Stress    Uncomfortable fullness after meals    Weight gain    Weight loss      Past Surgical History:   Procedure Laterality Date    Bunionectomy Bilateral     Colonoscopy      Colposcopy, cervix w/upper adjacent vagina; w/biopsy(s), cervix  2008    HPV+    Fracture surgery      repair of nasal fracture    Gastro - dmg  08/2016    healed ulcers    Other      wisdom teeth extraction    Other surgical history      nose repair 2014    Other surgical history Right 03/28/2024    anterior cervical discectomy at cervical 6-cervical 7 with placement of arthroplasty device    Up gi endoscopy,ball dil,30mm N/A 08/09/2016    Procedure: ESOPHAGOGASTRODUODENOSCOPY, POSSIBLE BIOPSY, POSSIBLE POLYPECTOMY 68651;  Surgeon: Sukh Samayoa MD;  Location: [facility]    Upper gi endo no barretts  05/2016    esophageal ulcer; HH    Upper gi endoscopy,biopsy N/A 05/17/2016    Procedure: ESOPHAGOGASTRODUODENOSCOPY, POSSIBLE BIOPSY, POSSIBLE POLYPECTOMY 71888;  Surgeon: Sukh Samayoa MD;  Location: [facility]      Family History   Problem Relation Age of Onset    Alcohol and Other Disorders Associated Father     Homicide History Father          father strangled mother, patient age 8    Psychiatric Sister         postpartum depression    Depression Son     Other (Other) Son         Duchennes muscular dystrophy    Cancer Maternal Grandmother         lung, brain    Breast Cancer Maternal Grandmother 52    Diabetes Maternal Grandmother     Stroke Maternal Grandmother     Anxiety Maternal Grandmother         undiagnosed    Cancer Maternal Grandfather     Stroke Maternal Grandfather     Lipids Maternal Grandfather     Diabetes Maternal Grandfather     Heart Attack Maternal Grandfather     Diabetes Paternal Grandmother     Depression Paternal Grandmother     Diabetes Paternal Grandfather     Crohn's Disease Maternal Aunt     Breast Cancer Maternal Aunt 62    Depression Other         pat uncle    Suicide History Other         pat uncle completed      Social History:   Social History     Socioeconomic History    Marital status:    Tobacco Use    Smoking status: Former     Current packs/day: 0.00     Average packs/day: 1 pack/day for 18.0 years (18.0 ttl pk-yrs)     Types: Cigarettes     Start date: 1990     Quit date: 2008     Years since quittin.2    Smokeless tobacco: Never    Tobacco comments:     non-smoker   Vaping Use    Vaping status: Never Used   Substance and Sexual Activity    Alcohol use: Yes     Comment: socially    Drug use: Never    Sexual activity: Yes     Partners: Male     Birth control/protection: Vasectomy, OCP   Other Topics Concern    Caffeine Concern Yes     Comment: 1 cup of coffee x day    Exercise Yes     Comment: 2 x week, cardio    Seat Belt Yes     Social Drivers of Health     Food Insecurity: No Food Insecurity (3/28/2024)    Food Insecurity     Food Insecurity: Never true   Transportation Needs: No Transportation Needs (3/28/2024)    Transportation Needs     Lack of Transportation: No   Housing Stability: Low Risk  (3/28/2024)    Housing Stability     Housing Instability: No        REVIEW OF SYSTEMS:   Review of  Systems   Constitutional:  Negative for chills, fatigue, fever and unexpected weight change.   HENT:  Negative for congestion, ear pain, postnasal drip, rhinorrhea, sore throat and trouble swallowing.    Eyes:  Negative for visual disturbance.   Respiratory:  Negative for cough and shortness of breath.    Cardiovascular:  Negative for chest pain and palpitations.   Gastrointestinal:  Positive for constipation (mild, for long time, improved with increased activity and PRN Metamucil & Miralax). Negative for abdominal pain, blood in stool, diarrhea, nausea and vomiting.        Some acid reflux   Endocrine: Negative for cold intolerance, heat intolerance, polydipsia, polyphagia and polyuria.   Genitourinary:  Negative for dysuria, frequency, hematuria and pelvic pain.   Musculoskeletal:  Negative for back pain.   Skin:  Negative for rash.   Neurological:  Negative for headaches.   Hematological:  Does not bruise/bleed easily.   Psychiatric/Behavioral:  Negative for dysphoric mood and sleep disturbance. The patient is not nervous/anxious.         Stable on medications       EXAM:   BP 96/56   Pulse 83   Temp 97.7 °F (36.5 °C)   Ht 5' 6\" (1.676 m)   Wt 143 lb (64.9 kg)   LMP 06/03/2024 (Exact Date)   SpO2 98%   BMI 23.08 kg/m²   Ideal body weight: 59.3 kg (130 lb 11.7 oz)  Adjusted ideal body weight: 61.5 kg (135 lb 10.2 oz)   Physical Exam  Constitutional:       General: She is not in acute distress.     Appearance: She is well-developed and well-groomed. She is not ill-appearing.   HENT:      Right Ear: Tympanic membrane, ear canal and external ear normal.      Left Ear: Tympanic membrane, ear canal and external ear normal.      Nose: Nose normal.      Mouth/Throat:      Mouth: Mucous membranes are moist.      Pharynx: Oropharynx is clear.   Eyes:      Conjunctiva/sclera: Conjunctivae normal.      Pupils: Pupils are equal, round, and reactive to light.   Neck:      Thyroid: No thyromegaly.   Cardiovascular:       Rate and Rhythm: Normal rate and regular rhythm.      Heart sounds: Normal heart sounds.   Pulmonary:      Effort: Pulmonary effort is normal.      Breath sounds: Normal breath sounds.   Abdominal:      General: Abdomen is flat. Bowel sounds are normal.      Palpations: Abdomen is soft.      Tenderness: There is no abdominal tenderness.   Musculoskeletal:         General: Normal range of motion.      Cervical back: Normal range of motion.   Skin:     General: Skin is warm and dry.   Neurological:      General: No focal deficit present.      Mental Status: She is alert and oriented to person, place, and time.      Cranial Nerves: No cranial nerve deficit.   Psychiatric:         Mood and Affect: Mood normal.         ASSESSMENT AND PLAN:   Diagnoses and all orders for this visit:    General medical exam  -     Hemoglobin A1C; Future  -     TSH W Reflex To Free T4; Future  -     Comp Metabolic Panel (14); Future  -     Lipid Panel; Future  -     CBC With Differential With Platelet; Future    Encounter for screening mammogram for malignant neoplasm of breast  -     [facility] VANDANA 2D+3D SCREENING BILAT (CPT=77067/52365); Future    Elevated LDL cholesterol level  Comments:  will return fasting lab work  Orders:  -     Comp Metabolic Panel (14); Future  -     Lipid Panel; Future    Vitamin D deficiency  Comments:  has been low in past, would like rechecked again  Orders:  -     Vitamin D [E]; Future    Gastroesophageal reflux disease, unspecified whether esophagitis present  Comments:  stable on PPI, new Rx sent  Orders:  -     Magnesium [E]; Future  -     omeprazole 20 MG Oral Capsule Delayed Release; Take 1 capsule (20 mg total) by mouth 2 (two) times daily before meals.    Other constipation  Comments:  discuss strategies to continue managing    Intrinsic eczema  Comments:  following with derm, improved with Dupixent    Severe episode of recurrent major depressive disorder, without psychotic features  ([facility])  Comments:  following with psych    Mood disorder  Comments:  following with psych    Anxiety  Comments:  following with psych    Menorrhagia with irregular cycle  Comments:  managed by gyne, on continuous OCP

## 2025-04-02 NOTE — TELEPHONE ENCOUNTER
Received fax from [employer] regarding patient's medication - Omeprazole    Plan does not cover this medication for more than one cap daily.    PA triggered in EPIC  Questions answered and submitted    Per progress note from today's visit:  Gastroesophageal reflux disease, unspecified whether esophagitis present  Comments:  stable on PPI, new Rx sent

## 2025-04-02 NOTE — TELEPHONE ENCOUNTER
Approved    Prior authorization approved  Payer: [employer] Part D Case ID: [id number]    [phone]    [phone]  Note from payer: Request Reference Number: [id number].  OMEPRAZOLE   CAP 20MG is approved through 04/02/2026.  Your patient may now fill this prescription and it will be covered.  Approval Details    Authorization number: [id number]  Authorized from April 2, 2025 to April 2, 2026  Electronic appeal: Not supported  View History    Medication resent to pharmacy

## 2025-04-05 ENCOUNTER — TELEPHONE (OUTPATIENT)
Dept: FAMILY MEDICINE CLINIC | Facility: CLINIC | Age: 50
End: 2025-04-05

## 2025-04-05 DIAGNOSIS — K21.9 GASTROESOPHAGEAL REFLUX DISEASE, UNSPECIFIED WHETHER ESOPHAGITIS PRESENT: ICD-10-CM

## 2025-04-07 RX ORDER — OMEPRAZOLE 20 MG/1
20 CAPSULE, DELAYED RELEASE ORAL
Qty: 180 CAPSULE | Refills: 1 | Status: SHIPPED | OUTPATIENT
Start: 2025-04-07 | End: 2025-10-04

## 2025-04-08 ENCOUNTER — TELEPHONE (OUTPATIENT)
Dept: FAMILY MEDICINE CLINIC | Facility: CLINIC | Age: 50
End: 2025-04-08

## 2025-04-08 ENCOUNTER — TELEPHONE (OUTPATIENT)
Dept: ORTHOPEDICS CLINIC | Facility: CLINIC | Age: 50
End: 2025-04-08

## 2025-04-08 NOTE — TELEPHONE ENCOUNTER
Patient called stating on 4/1 patient had a phone visit with Dr Gonzalez and he told patient someone would call patient to set up an appointment for injections. Patient called to scheduled for injections.   Future Appointments   Date Time Provider Department Center   4/21/2025  1:40 PM Jefferson Gonzalez,  [facility] [address]   4/23/2025  1:00 PM Joby Mcclure APRN [facility] [place]

## 2025-04-08 NOTE — TELEPHONE ENCOUNTER
Fax from , patient needing pre op appointment. Surgery date 5/16/25    Please call patient to schedule

## 2025-04-09 NOTE — TELEPHONE ENCOUNTER
Scheduled.    Future Appointments   Date Time Provider Department Center   4/21/2025  1:40 PM Jefferson Gonzalez DO [facility] [address]   4/23/2025  1:00 PM Joby Mcclure APRN [facility] [place]   5/5/2025 12:40 PM Vita Delnua APRN [facility] [place]   5/10/2025 10:00 AM PF [facility] RM2 [facility] [place]   5/28/2025 10:45 AM Jolanta Jin MD [facility] [place]

## 2025-04-11 ENCOUNTER — LAB ENCOUNTER (OUTPATIENT)
Dept: LAB | Age: 50
End: 2025-04-11
Attending: NURSE PRACTITIONER
Payer: COMMERCIAL

## 2025-04-11 ENCOUNTER — OFFICE VISIT (OUTPATIENT)
Facility: CLINIC | Age: 50
End: 2025-04-11
Payer: COMMERCIAL

## 2025-04-11 VITALS — BODY MASS INDEX: 22.98 KG/M2 | WEIGHT: 143 LBS | HEIGHT: 66 IN

## 2025-04-11 DIAGNOSIS — K21.9 GASTROESOPHAGEAL REFLUX DISEASE, UNSPECIFIED WHETHER ESOPHAGITIS PRESENT: ICD-10-CM

## 2025-04-11 DIAGNOSIS — M53.3 COCCYDYNIA: Primary | ICD-10-CM

## 2025-04-11 DIAGNOSIS — Z00.00 GENERAL MEDICAL EXAM: ICD-10-CM

## 2025-04-11 DIAGNOSIS — E78.00 ELEVATED LDL CHOLESTEROL LEVEL: ICD-10-CM

## 2025-04-11 DIAGNOSIS — E55.9 VITAMIN D DEFICIENCY: ICD-10-CM

## 2025-04-11 DIAGNOSIS — M53.3 SACROCOCCYGEAL PAIN: ICD-10-CM

## 2025-04-11 LAB
ALBUMIN SERPL-MCNC: 4.7 G/DL (ref 3.2–4.8)
ALBUMIN/GLOB SERPL: 1.8 {RATIO} (ref 1–2)
ALP LIVER SERPL-CCNC: 41 U/L (ref 39–100)
ALT SERPL-CCNC: 25 U/L (ref 10–49)
ANION GAP SERPL CALC-SCNC: 6 MMOL/L (ref 0–18)
AST SERPL-CCNC: 21 U/L (ref ?–34)
BASOPHILS # BLD AUTO: 0.02 X10(3) UL (ref 0–0.2)
BASOPHILS NFR BLD AUTO: 0.3 %
BILIRUB SERPL-MCNC: 0.4 MG/DL (ref 0.3–1.2)
BUN BLD-MCNC: 14 MG/DL (ref 9–23)
CALCIUM BLD-MCNC: 9.7 MG/DL (ref 8.7–10.6)
CHLORIDE SERPL-SCNC: 106 MMOL/L (ref 98–112)
CHOLEST SERPL-MCNC: 170 MG/DL (ref ?–200)
CO2 SERPL-SCNC: 28 MMOL/L (ref 21–32)
CREAT BLD-MCNC: 0.99 MG/DL (ref 0.55–1.02)
EGFRCR SERPLBLD CKD-EPI 2021: 71 ML/MIN/1.73M2 (ref 60–?)
EOSINOPHIL # BLD AUTO: 0.06 X10(3) UL (ref 0–0.7)
EOSINOPHIL NFR BLD AUTO: 1 %
ERYTHROCYTE [DISTWIDTH] IN BLOOD BY AUTOMATED COUNT: 13.5 %
EST. AVERAGE GLUCOSE BLD GHB EST-MCNC: 85 MG/DL (ref 68–126)
FASTING PATIENT LIPID ANSWER: YES
FASTING STATUS PATIENT QL REPORTED: YES
GLOBULIN PLAS-MCNC: 2.6 G/DL (ref 2–3.5)
GLUCOSE BLD-MCNC: 79 MG/DL (ref 70–99)
HBA1C MFR BLD: 4.6 % (ref ?–5.7)
HCT VFR BLD AUTO: 40.9 % (ref 35–48)
HDLC SERPL-MCNC: 48 MG/DL (ref 40–59)
HGB BLD-MCNC: 13.6 G/DL (ref 12–16)
IMM GRANULOCYTES # BLD AUTO: 0.02 X10(3) UL (ref 0–1)
IMM GRANULOCYTES NFR BLD: 0.3 %
LDLC SERPL CALC-MCNC: 100 MG/DL (ref ?–100)
LYMPHOCYTES # BLD AUTO: 1.31 X10(3) UL (ref 1–4)
LYMPHOCYTES NFR BLD AUTO: 21.8 %
MAGNESIUM SERPL-MCNC: 2.2 MG/DL (ref 1.6–2.6)
MCH RBC QN AUTO: 28.8 PG (ref 26–34)
MCHC RBC AUTO-ENTMCNC: 33.3 G/DL (ref 31–37)
MCV RBC AUTO: 86.5 FL (ref 80–100)
MONOCYTES # BLD AUTO: 0.3 X10(3) UL (ref 0.1–1)
MONOCYTES NFR BLD AUTO: 5 %
NEUTROPHILS # BLD AUTO: 4.3 X10 (3) UL (ref 1.5–7.7)
NEUTROPHILS # BLD AUTO: 4.3 X10(3) UL (ref 1.5–7.7)
NEUTROPHILS NFR BLD AUTO: 71.6 %
NONHDLC SERPL-MCNC: 122 MG/DL (ref ?–130)
OSMOLALITY SERPL CALC.SUM OF ELEC: 289 MOSM/KG (ref 275–295)
PLATELET # BLD AUTO: 279 10(3)UL (ref 150–450)
POTASSIUM SERPL-SCNC: 4.4 MMOL/L (ref 3.5–5.1)
PROT SERPL-MCNC: 7.3 G/DL (ref 5.7–8.2)
RBC # BLD AUTO: 4.73 X10(6)UL (ref 3.8–5.3)
SODIUM SERPL-SCNC: 140 MMOL/L (ref 136–145)
TRIGL SERPL-MCNC: 126 MG/DL (ref 30–149)
TSI SER-ACNC: 1.02 UIU/ML (ref 0.55–4.78)
VIT D+METAB SERPL-MCNC: 32.6 NG/ML (ref 30–100)
VLDLC SERPL CALC-MCNC: 21 MG/DL (ref 0–30)
WBC # BLD AUTO: 6 X10(3) UL (ref 4–11)

## 2025-04-11 PROCEDURE — 82306 VITAMIN D 25 HYDROXY: CPT

## 2025-04-11 PROCEDURE — 36415 COLL VENOUS BLD VENIPUNCTURE: CPT

## 2025-04-11 PROCEDURE — 80053 COMPREHEN METABOLIC PANEL: CPT

## 2025-04-11 PROCEDURE — 85025 COMPLETE CBC W/AUTO DIFF WBC: CPT

## 2025-04-11 PROCEDURE — 80061 LIPID PANEL: CPT

## 2025-04-11 PROCEDURE — 83735 ASSAY OF MAGNESIUM: CPT

## 2025-04-11 PROCEDURE — 84443 ASSAY THYROID STIM HORMONE: CPT

## 2025-04-11 PROCEDURE — 83036 HEMOGLOBIN GLYCOSYLATED A1C: CPT

## 2025-04-11 RX ORDER — TRAMADOL HYDROCHLORIDE 50 MG/1
TABLET ORAL
Qty: 20 TABLET | Refills: 0 | Status: SHIPPED | OUTPATIENT
Start: 2025-04-11

## 2025-04-13 RX ORDER — TRIAMCINOLONE ACETONIDE 40 MG/ML
40 INJECTION, SUSPENSION INTRA-ARTICULAR; INTRAMUSCULAR ONCE
Status: COMPLETED | OUTPATIENT
Start: 2025-04-13 | End: 2025-04-14

## 2025-04-14 ENCOUNTER — PATIENT MESSAGE (OUTPATIENT)
Dept: FAMILY MEDICINE CLINIC | Facility: CLINIC | Age: 50
End: 2025-04-14

## 2025-04-14 RX ADMIN — TRIAMCINOLONE ACETONIDE 40 MG: 40 INJECTION, SUSPENSION INTRA-ARTICULAR; INTRAMUSCULAR at 11:05:00

## 2025-04-14 NOTE — TELEPHONE ENCOUNTER
----- Message from Vita Deluna sent at 4/14/2025  1:26 PM CDT -----  Results reviewed.   Cholesterol improved, keep up the good work!  No diabetes or prediabetes  No anemia  Vitamin D normal  Thyroid function normal  Chemistries normal  Magnesium normal    ----- Message -----  From: Lab, Background User  Sent: 4/11/2025   6:01 PM CDT  To: SHELL Duque

## 2025-04-14 NOTE — PROGRESS NOTES
Sports Medicine Clinic Note    Subjective:    Chief Complaint: Tailbone pain    History: 49-year-old female with a history of chronic coccygeal pain presents with recurrence of symptoms. The pain has returned to previous intensity, significantly impairing her ability to sit comfortably. She reports no recent trauma. Previous ultrasound-guided injections provided about four months of relief, and she is now seeking repeat treatment. Pain exacerbated by prior significant weight loss, attributed to decreased cushioning around the coccyx. No new systemic symptoms. She denies bowel or bladder dysfunction. She is not currently on oral analgesics but is open to using stronger medication until the injection takes effect. She has intermittently used a cushion to offload pressure during sitting.    Objective:    Sacrococcygeal Examination:    Inspection: Normal gait, no postural abnormalities.  Palpation: Localized tenderness at the sacrococcygeal region.  Range of Motion: Unrestricted lower extremity ROM, non-provocative.  Neurovascular: Intact sensation and motor function. Bilateral peripheral pulses present.    Diagnostic Tests:    No new imaging performed.    Previous MRI findings reviewed: No significant abnormalities at the sacrococcygeal region. Lumbar spine findings included grade 1 anterolisthesis and bilateral spondylolysis at L5-S1, and severe degenerative disc disease at L4-L5.    Assessment:    Recurrent coccygeal pain, likely mechanical in nature, exacerbated by prior significant weight loss and prolonged sitting/postural stress.    Plan:    Procedures: Ultrasound-guided corticosteroid injection administered in-office today into the sacrococcygeal joint under sterile technique.  Medications: NSAIDs or Acetaminophen as needed for pain. Tramadol prescribed short-term until the injection becomes effective.  Activity Recommendations: Avoid strenuous activity for 24-48 hours. Emphasize consistent use of coccygeal  cushion when seated. Encourage frequent position changes and limit prolonged sitting.  Therapy: Consider pelvic floor and core stabilization physical therapy if symptoms persist.  Additional Workup: Coccygectomy discussed as a surgical option if conservative measures fail. Radiofrequency ablation considered as an alternative with limited supporting evidence.    Follow-Up: Tentatively scheduled in 3 to 4 weeks to reassess symptom response. Patient advised to return sooner if symptoms worsen or new issues arise.    - - -    Ultrasound Guided Procedure Note:    After discussion of the risks and benefits, the patient elected to proceed with an ultrasound guided injection into the sacrococcygeal joint. Confirmed that the patient does not have history of prior adverse reactions, active infections, or relevant allergies. There was no erythema or warmth, and the skin was clear.     The skin was sterilized with ChloraPrep. A 25 gauge needle was inserted via distal approach utilizing US for needle guidance and placement. The site was injected with a mixture of 1 mL of triamcinolone 40 mg/mL and 1 mL of 1% Lidocaine without Epinephrine. The injection was completed without complication, and a bandage was applied. The patient tolerated the procedure well and was instructed to avoid strenuous activity for the next 24-48 hours and to use ice or Tylenol for pain as needed. The patient will call immediately with any signs of infection or allergic reaction.    Post-Injection Care: The patient tolerated the procedure well. An occlusive bandage was placed over the injection site. Post-injection care instructions provided to the patient. The patient was asked to avoid strenuous activity and continue to rest the area for 2-3 days before resuming regular activities. Patient advised that the area may be more painful for the first 1-2 days. They can use ice or Tylenol for pain as needed.  Patient was instructed to watch for fever, increased  swelling, or persistent pain. The patient will call immediately with any signs of infection or allergic reaction.      Jefferson Gonzalez DO, CAM   Primary Care Sports Medicine

## 2025-04-28 ENCOUNTER — OFFICE VISIT (OUTPATIENT)
Dept: FAMILY MEDICINE CLINIC | Facility: CLINIC | Age: 50
End: 2025-04-28
Payer: COMMERCIAL

## 2025-04-28 VITALS
DIASTOLIC BLOOD PRESSURE: 70 MMHG | WEIGHT: 141 LBS | HEIGHT: 66 IN | SYSTOLIC BLOOD PRESSURE: 110 MMHG | BODY MASS INDEX: 22.66 KG/M2 | TEMPERATURE: 98 F

## 2025-04-28 DIAGNOSIS — S16.1XXA STRAIN OF NECK MUSCLE, INITIAL ENCOUNTER: Primary | ICD-10-CM

## 2025-04-28 PROCEDURE — 99213 OFFICE O/P EST LOW 20 MIN: CPT | Performed by: NURSE PRACTITIONER

## 2025-04-28 RX ORDER — CYCLOBENZAPRINE HCL 10 MG
10 TABLET ORAL 3 TIMES DAILY PRN
Qty: 20 TABLET | Refills: 0 | Status: SHIPPED | OUTPATIENT
Start: 2025-04-28

## 2025-04-28 RX ORDER — METHYLPREDNISOLONE 4 MG/1
TABLET ORAL
Qty: 1 EACH | Refills: 0 | Status: SHIPPED | OUTPATIENT
Start: 2025-04-28

## 2025-04-28 NOTE — PROGRESS NOTES
HPI:   Neck Pain   This is a new problem. Episode onset: moving [employer] down from shelf, lid hit face and neck bent backwards. No LOC. Happened 9 days ago. The problem has been gradually improving (slightly less tightness). The pain is associated with lifting a heavy object. The pain is present in the left side and right side (right seems be improving faster though). The symptoms are aggravated by twisting. Pertinent negatives include no pain with swallowing, paresis, photophobia, trouble swallowing or weakness. Lorie has tried NSAIDs and heat for the symptoms. The treatment provided moderate relief.        Current Medications[1]   Past Medical History[2]   Past Surgical History[3]   Family History[4]   Short Social Hx on File[5]      REVIEW OF SYSTEMS:   Review of Systems   HENT:  Negative for trouble swallowing.    Eyes:  Negative for photophobia.   Musculoskeletal:  Positive for neck pain and neck stiffness.   Neurological:  Negative for weakness.       EXAM:   /70   Temp 98.3 °F (36.8 °C)   Ht 5' 6\" (1.676 m)   Wt 141 lb (64 kg)   LMP 06/03/2024 (Exact Date)   BMI 22.76 kg/m²   Physical Exam  Constitutional:       General: Lorie is not in acute distress.     Appearance: Lorie is normal weight.   Musculoskeletal:      Cervical back: No crepitus. Pain with movement and muscular tenderness present. Decreased range of motion.   Neurological:      Mental Status: Lorie is alert.         ASSESSMENT AND PLAN:   Diagnoses and all orders for this visit:    Strain of neck muscle, initial encounter  -     methylPREDNISolone (MEDROL) 4 MG Oral Tablet Therapy Pack; As directed.  -     cyclobenzaprine 10 MG Oral Tab; Take 1 tablet (10 mg total) by mouth 3 (three) times daily as needed for Muscle spasms.    If worsening or persisting, should follow up    Note to patient: The 21st Century Cures Act makes medical notes like these available to patients in the interest of transparency. However, be advised this is  a medical document. It is intended as peer to peer communication. It is written in medical language and may contain abbreviations or verbiage that are unfamiliar. It may appear blunt or direct. Medical documents are intended to carry relevant information, facts as evident, and the clinical opinion of the practitioner.           [1]   Current Outpatient Medications   Medication Sig Dispense Refill    methylPREDNISolone (MEDROL) 4 MG Oral Tablet Therapy Pack As directed. 1 each 0    cyclobenzaprine 10 MG Oral Tab Take 1 tablet (10 mg total) by mouth 3 (three) times daily as needed for Muscle spasms. 20 tablet 0    lamoTRIgine 150 MG Oral Tab Take 1 tablet (150 mg total) by mouth 2 (two) times daily. 180 tablet 0    DESVENLAFAXINE  MG Oral Tablet 24 Hr TAKE 1 TABLET(100 MG) BY MOUTH DAILY 90 tablet 0    CLONIDINE 0.1 MG Oral Tab TAKE 1 TABLET(0.1 MG) BY MOUTH TWICE DAILY 180 tablet 0    omeprazole 20 MG Oral Capsule Delayed Release Take 1 capsule (20 mg total) by mouth 2 (two) times daily before meals. 180 capsule 1    ALPRAZolam 0.25 MG Oral Tab Take 1 tablet (0.25 mg total) by mouth daily as needed for Anxiety (flying). 6 tablet 0    Norethindrone Acet-Ethinyl Est (JUNEL 1.5/30) 1.5-30 MG-MCG Oral Tab Take 1 tablet by mouth daily. Take one active pill daily for continuous therapy and skip placebo week. 112 tablet 3    semaglutide 2 MG/1.5ML Subcutaneous Solution Pen-injector Inject 0.25 mg into the skin once a week. For weightloss  Sundays      SUMAtriptan Succinate 100 MG Oral Tab TAKE 1 TABLET BY MOUTH WITHIN 30 MINUTES OF HEADACHE ONSET 9 tablet 0    DUPIXENT 300 MG/2ML Subcutaneous Solution Auto-injector      [2]   Past Medical History:   Abdominal distention    Abdominal pain    Allergic rhinitis    Anxiety state, unspecified    Back pain    Back problem    Bad breath    Belching    Bloating    Blood in the stool    Constipation    Depression    Dysmenorrhea    Esophageal reflux    Frequent use of  laxatives    Heartburn    Hemorrhoids    High cholesterol    History of depression    History of stomach ulcers    Hx of motion sickness    Indigestion    Irregular bowel habits    Loss of appetite    Migraine    Migraines    Nausea    Nexplanon in place    Placed 12/2020      Obesity    Other and unspecified hyperlipidemia    Pain with bowel movements    Problems with swallowing    history of    Sleep disturbance    Stress    Uncomfortable fullness after meals    Weight gain    Weight loss   [3]   Past Surgical History:  Procedure Laterality Date    Bunionectomy Bilateral     Colonoscopy      Colposcopy, cervix w/upper adjacent vagina; w/biopsy(s), cervix  2008    HPV+    Fracture surgery      repair of nasal fracture    Gastro - dmg  08/2016    healed ulcers    Other      wisdom teeth extraction    Other surgical history      nose repair 2014    Other surgical history Right 03/28/2024    anterior cervical discectomy at cervical 6-cervical 7 with placement of arthroplasty device    Up gi endoscopy,ball dil,30mm N/A 08/09/2016    Procedure: ESOPHAGOGASTRODUODENOSCOPY, POSSIBLE BIOPSY, POSSIBLE POLYPECTOMY 37363;  Surgeon: Sukh Samayoa MD;  Location: [facility]    Upper gi endo no barretts  05/2016    esophageal ulcer;     Upper gi endoscopy,biopsy N/A 05/17/2016    Procedure: ESOPHAGOGASTRODUODENOSCOPY, POSSIBLE BIOPSY, POSSIBLE POLYPECTOMY 92812;  Surgeon: Sukh Samayoa MD;  Location: [facility]   [4]   Family History  Problem Relation Age of Onset    Alcohol and Other Disorders Associated Father     Homicide History Father         father strangled mother, patient age 8    Psychiatric Sister         postpartum depression    Depression Son     Other (Other) Son         Duchennes muscular dystrophy    Cancer Maternal Grandmother         lung, brain    Breast Cancer Maternal Grandmother 52    Diabetes Maternal Grandmother     Stroke Maternal Grandmother     Anxiety Maternal Grandmother          undiagnosed    Cancer Maternal Grandfather     Stroke Maternal Grandfather     Lipids Maternal Grandfather     Diabetes Maternal Grandfather     Heart Attack Maternal Grandfather     Diabetes Paternal Grandmother     Depression Paternal Grandmother     Diabetes Paternal Grandfather     Crohn's Disease Maternal Aunt     Breast Cancer Maternal Aunt 62    Depression Other         pat uncle    Suicide History Other         pat uncle completed   [5]   Social History  Socioeconomic History    Marital status:    Tobacco Use    Smoking status: Former     Current packs/day: 0.00     Average packs/day: 1 pack/day for 18.0 years (18.0 ttl pk-yrs)     Types: Cigarettes     Start date: 1990     Quit date: 2008     Years since quittin.2    Smokeless tobacco: Never    Tobacco comments:     non-smoker   Vaping Use    Vaping status: Never Used   Substance and Sexual Activity    Alcohol use: Yes     Comment: socially    Drug use: Never    Sexual activity: Yes     Partners: Male     Birth control/protection: Vasectomy, OCP   Other Topics Concern    Caffeine Concern Yes     Comment: 1 cup of coffee x day    Exercise Yes     Comment: 2 x week, cardio    Seat Belt Yes     Social Drivers of Health     Food Insecurity: No Food Insecurity (3/28/2024)    Food Insecurity     Food Insecurity: Never true   Transportation Needs: No Transportation Needs (3/28/2024)    Transportation Needs     Lack of Transportation: No   Housing Stability: Low Risk  (3/28/2024)    Housing Stability     Housing Instability: No

## 2025-05-02 DIAGNOSIS — N93.9 ABNORMAL UTERINE BLEEDING (AUB): ICD-10-CM

## 2025-05-02 RX ORDER — NORETHINDRONE ACETATE AND ETHINYL ESTRADIOL 1.5; .03 MG/1; MG/1
1 TABLET ORAL DAILY
Qty: 84 TABLET | Refills: 0 | Status: SHIPPED | OUTPATIENT
Start: 2025-05-02

## 2025-05-10 ENCOUNTER — HOSPITAL ENCOUNTER (OUTPATIENT)
Dept: MAMMOGRAPHY | Age: 50
Discharge: HOME | End: 2025-05-10
Attending: NURSE PRACTITIONER
Payer: COMMERCIAL

## 2025-05-10 DIAGNOSIS — Z12.31 ENCOUNTER FOR SCREENING MAMMOGRAM FOR MALIGNANT NEOPLASM OF BREAST: ICD-10-CM

## 2025-05-10 PROCEDURE — 77067 SCR MAMMO BI INCL CAD: CPT | Performed by: NURSE PRACTITIONER

## 2025-05-10 PROCEDURE — 77063 BREAST TOMOSYNTHESIS BI: CPT | Performed by: NURSE PRACTITIONER

## 2025-05-12 ENCOUNTER — PATIENT MESSAGE (OUTPATIENT)
Dept: FAMILY MEDICINE CLINIC | Facility: CLINIC | Age: 50
End: 2025-05-12

## 2025-05-12 NOTE — TELEPHONE ENCOUNTER
5/12/2025  1:52 PM Y Treasure Sweeney RN Test Results Message  Message about your results     [facility] [facility]

## 2025-05-12 NOTE — TELEPHONE ENCOUNTER
----- Message from Vita Deluna sent at 5/12/2025  1:44 PM CDT -----  Results reviewed.   Benign mammogram. Repeat in 1 year    Patient has dense breast tissue and screening breast MRI, molecular breast imaging, or whole breast ultrasound is recommended. Typically screening breast ultrasound is best covered by insurance but   recommend checking with your insurance    If agreeable, can place order for that as well.    ----- Message -----  From: Dain Cordero [facility]  Sent: 5/12/2025   1:40 PM CDT  To: Vita Deluna, SHELL

## 2025-05-29 ENCOUNTER — OFFICE VISIT (OUTPATIENT)
Dept: FAMILY MEDICINE CLINIC | Facility: CLINIC | Age: 50
End: 2025-05-29
Payer: COMMERCIAL

## 2025-05-29 VITALS
BODY MASS INDEX: 23.46 KG/M2 | HEART RATE: 78 BPM | SYSTOLIC BLOOD PRESSURE: 104 MMHG | HEIGHT: 66 IN | DIASTOLIC BLOOD PRESSURE: 66 MMHG | OXYGEN SATURATION: 99 % | WEIGHT: 146 LBS | TEMPERATURE: 98 F

## 2025-05-29 DIAGNOSIS — F41.1 GAD (GENERALIZED ANXIETY DISORDER): ICD-10-CM

## 2025-05-29 DIAGNOSIS — K21.9 GASTROESOPHAGEAL REFLUX DISEASE, UNSPECIFIED WHETHER ESOPHAGITIS PRESENT: ICD-10-CM

## 2025-05-29 DIAGNOSIS — M20.41 OTHER HAMMER TOE(S) (ACQUIRED), RIGHT FOOT: ICD-10-CM

## 2025-05-29 DIAGNOSIS — F39 MOOD DISORDER: ICD-10-CM

## 2025-05-29 DIAGNOSIS — E78.00 ELEVATED LDL CHOLESTEROL LEVEL: ICD-10-CM

## 2025-05-29 DIAGNOSIS — Z01.818 PREOPERATIVE CLEARANCE: Primary | ICD-10-CM

## 2025-05-29 DIAGNOSIS — D49.2 NEOPLASM OF UNSPECIFIED BEHAVIOR OF BONE, SOFT TISSUE, AND SKIN: ICD-10-CM

## 2025-05-29 DIAGNOSIS — F33.2 SEVERE EPISODE OF RECURRENT MAJOR DEPRESSIVE DISORDER, WITHOUT PSYCHOTIC FEATURES (HCC): ICD-10-CM

## 2025-05-29 PROBLEM — N93.9 ABNORMAL UTERINE BLEEDING (AUB): Status: RESOLVED | Noted: 2024-06-10 | Resolved: 2025-05-29

## 2025-05-29 LAB
ALBUMIN SERPL-MCNC: 4.3 G/DL (ref 3.2–4.8)
ALBUMIN/GLOB SERPL: 1.8 {RATIO} (ref 1–2)
ALP LIVER SERPL-CCNC: 41 U/L (ref 39–100)
ALT SERPL-CCNC: 33 U/L (ref 10–49)
ANION GAP SERPL CALC-SCNC: 9 MMOL/L (ref 0–18)
AST SERPL-CCNC: 29 U/L (ref ?–34)
BASOPHILS # BLD AUTO: 0.04 X10(3) UL (ref 0–0.2)
BASOPHILS NFR BLD AUTO: 0.5 %
BILIRUB SERPL-MCNC: 0.4 MG/DL (ref 0.3–1.2)
BUN BLD-MCNC: 12 MG/DL (ref 9–23)
CALCIUM BLD-MCNC: 9.4 MG/DL (ref 8.7–10.6)
CHLORIDE SERPL-SCNC: 104 MMOL/L (ref 98–112)
CO2 SERPL-SCNC: 26 MMOL/L (ref 21–32)
CREAT BLD-MCNC: 0.83 MG/DL (ref 0.55–1.02)
EGFRCR SERPLBLD CKD-EPI 2021: 87 ML/MIN/1.73M2 (ref 60–?)
EOSINOPHIL # BLD AUTO: 0.06 X10(3) UL (ref 0–0.7)
EOSINOPHIL NFR BLD AUTO: 0.8 %
ERYTHROCYTE [DISTWIDTH] IN BLOOD BY AUTOMATED COUNT: 13.8 %
FASTING STATUS PATIENT QL REPORTED: NO
GLOBULIN PLAS-MCNC: 2.4 G/DL (ref 2–3.5)
GLUCOSE BLD-MCNC: 89 MG/DL (ref 70–99)
HCT VFR BLD AUTO: 38 % (ref 35–48)
HGB BLD-MCNC: 13 G/DL (ref 12–16)
IMM GRANULOCYTES # BLD AUTO: 0.03 X10(3) UL (ref 0–1)
IMM GRANULOCYTES NFR BLD: 0.4 %
LYMPHOCYTES # BLD AUTO: 1.3 X10(3) UL (ref 1–4)
LYMPHOCYTES NFR BLD AUTO: 17.7 %
MCH RBC QN AUTO: 29 PG (ref 26–34)
MCHC RBC AUTO-ENTMCNC: 34.2 G/DL (ref 31–37)
MCV RBC AUTO: 84.8 FL (ref 80–100)
MONOCYTES # BLD AUTO: 0.36 X10(3) UL (ref 0.1–1)
MONOCYTES NFR BLD AUTO: 4.9 %
NEUTROPHILS # BLD AUTO: 5.57 X10 (3) UL (ref 1.5–7.7)
NEUTROPHILS # BLD AUTO: 5.57 X10(3) UL (ref 1.5–7.7)
NEUTROPHILS NFR BLD AUTO: 75.7 %
OSMOLALITY SERPL CALC.SUM OF ELEC: 287 MOSM/KG (ref 275–295)
PLATELET # BLD AUTO: 219 10(3)UL (ref 150–450)
PLATELETS.RETICULATED NFR BLD AUTO: 2.1 % (ref 0–7)
POTASSIUM SERPL-SCNC: 4.3 MMOL/L (ref 3.5–5.1)
PROT SERPL-MCNC: 6.7 G/DL (ref 5.7–8.2)
RBC # BLD AUTO: 4.48 X10(6)UL (ref 3.8–5.3)
SODIUM SERPL-SCNC: 139 MMOL/L (ref 136–145)
WBC # BLD AUTO: 7.4 X10(3) UL (ref 4–11)

## 2025-05-29 PROCEDURE — 85025 COMPLETE CBC W/AUTO DIFF WBC: CPT | Performed by: NURSE PRACTITIONER

## 2025-05-29 PROCEDURE — 80053 COMPREHEN METABOLIC PANEL: CPT | Performed by: NURSE PRACTITIONER

## 2025-05-29 PROCEDURE — 99202 OFFICE O/P NEW SF 15 MIN: CPT | Performed by: NURSE PRACTITIONER

## 2025-05-29 RX ORDER — CEPHALEXIN 500 MG/1
CAPSULE ORAL EVERY 8 HOURS
COMMUNITY
Start: 2025-05-13

## 2025-05-29 RX ORDER — HYDROCODONE BITARTRATE AND ACETAMINOPHEN 7.5; 325 MG/1; MG/1
1 TABLET ORAL
COMMUNITY
Start: 2025-05-14

## 2025-05-29 NOTE — PROGRESS NOTES
Vita Gillis is a 49 year old female who presents for a pre-operative physical exam.     Patient is scheduled for an Arthroplasty 5th Digit and Excision of Lesion Right Foot procedure at [facility] on 6/18/25 performed by Dr Nava.   Indication: Other Hammer Toe Right Foot, Neoplasm of Unspecified Behavior Bone, Soft Tissue, or Skin     Lorie has had previous anesthesia:  Yes.    Previous complications:  No  Personal or Family History Malignant Hyperthermia: No; son cannot have anesthesia due to history of muscular dystrophy     Respiratory Disease: No  Cardiac Disease: Yes, Dyslipidemia  Endocrine Disease: No  Sleep Apnea: No    Short Social Hx on File[1]   Past Medical History[2]  Past Surgical History[3]  Allergies: Allergies[4]  Current Medications[5]     REVIEW OF SYSTEMS:   Review of Systems   Constitutional:  Negative for chills, fever and malaise/fatigue.   HENT:  Negative for congestion, ear pain, sinus pain and sore throat.    Respiratory:  Negative for cough and shortness of breath.    Cardiovascular:  Negative for chest pain, palpitations and orthopnea.   Gastrointestinal:  Negative for abdominal pain, constipation, diarrhea, nausea and vomiting.   Genitourinary:  Negative for dysuria, frequency and hematuria.   Musculoskeletal:  Negative for joint pain.   Skin:  Negative for itching and rash.   Neurological:  Negative for headaches.   Endo/Heme/Allergies:  Negative for polydipsia.        PHYSICAL EXAM:   /66   Pulse 78   Temp 98.1 °F (36.7 °C)   Ht 5' 6\" (1.676 m)   Wt 146 lb (66.2 kg)   LMP 06/03/2024 (Exact Date)   SpO2 99%   BMI 23.57 kg/m²    Physical Exam  Constitutional:       Appearance: Normal appearance. Lorie is not ill-appearing.   HENT:      Right Ear: Tympanic membrane, ear canal and external ear normal.      Left Ear: Tympanic membrane, ear canal and external ear normal.      Nose: Nose normal.      Mouth/Throat:      Mouth: Mucous membranes are moist.       Pharynx: Oropharynx is clear.   Eyes:      Conjunctiva/sclera: Conjunctivae normal.      Pupils: Pupils are equal, round, and reactive to light.   Cardiovascular:      Rate and Rhythm: Normal rate and regular rhythm.      Heart sounds: Normal heart sounds.   Pulmonary:      Effort: Pulmonary effort is normal.      Breath sounds: Normal breath sounds.   Abdominal:      General: Abdomen is flat. Bowel sounds are normal.      Palpations: Abdomen is soft.   Skin:     General: Skin is warm and dry.      Findings: No rash.   Neurological:      General: No focal deficit present.      Mental Status: Lorie is alert.   Psychiatric:         Mood and Affect: Mood normal.        ASSESSMENT AND PLAN:   Lorie was seen today for pre-op exam.    Diagnoses and all orders for this visit:    Preoperative clearance  -     CBC W Differential W Platelet [E]; Future  -     Comp Metabolic Panel (14) [E]; Future  -     CBC W Differential W Platelet [E]  -     Comp Metabolic Panel (14) [E]    Other hammer toe(s) (acquired), right foot  -     CBC W Differential W Platelet [E]; Future  -     Comp Metabolic Panel (14) [E]; Future  -     CBC W Differential W Platelet [E]  -     Comp Metabolic Panel (14) [E]    Neoplasm of unspecified behavior of bone, soft tissue, and skin  -     CBC W Differential W Platelet [E]; Future  -     Comp Metabolic Panel (14) [E]; Future  -     CBC W Differential W Platelet [E]  -     Comp Metabolic Panel (14) [E]    Elevated LDL cholesterol level    Severe episode of recurrent major depressive disorder, without psychotic features (HCC)    Mood disorder    LIZ (generalized anxiety disorder)    Gastroesophageal reflux disease, unspecified whether esophagitis present        Note to patient: The 21st Century Cures Act makes medical notes like these available to patients in the interest of transparency. However, be advised this is a medical document. It is intended as peer to peer communication. It is written in medical  language and may contain abbreviations or verbiage that are unfamiliar. It may appear blunt or direct. Medical documents are intended to carry relevant information, facts as evident, and the clinical opinion of the practitioner.              [1]  Social History  Socioeconomic History   • Marital status:    Tobacco Use   • Smoking status: Former     Current packs/day: 0.00     Average packs/day: 1 pack/day for 18.0 years (18.0 ttl pk-yrs)     Types: Cigarettes     Start date: 1990     Quit date: 2008     Years since quittin.3     Passive exposure: Past   • Smokeless tobacco: Never   • Tobacco comments:     non-smoker   Vaping Use   • Vaping status: Never Used   Substance and Sexual Activity   • Alcohol use: Yes     Comment: socially   • Drug use: Never   • Sexual activity: Yes     Partners: Male     Birth control/protection: Vasectomy, OCP   Other Topics Concern   • Caffeine Concern Yes     Comment: 1 cup of coffee x day   • Exercise Yes     Comment: 2 x week, cardio   • Seat Belt Yes     Social Drivers of Health     Food Insecurity: No Food Insecurity (2025)    NCSS - Food Insecurity    • Worried About Running Out of Food in the Last Year: No    • Ran Out of Food in the Last Year: No   Transportation Needs: No Transportation Needs (2025)    NCSS - Transportation    • Lack of Transportation: No   Housing Stability: Not At Risk (2025)    NCSS - Housing/Utilities    • Has Housing: Yes    • Worried About Losing Housing: No    • Unable to Get Utilities: No   [2]  Past Medical History:  • Abdominal distention   • Abdominal pain   • Abnormal uterine bleeding (AUB)   • Allergic rhinitis   • Anterolisthesis   • Anxiety state, unspecified   • Back pain   • Back problem   • Bad breath   • Belching   • Bloating   • Blood in the stool   • Constipation   • Depression   • Dysmenorrhea   • Esophageal reflux   • Frequent use of laxatives   • Heartburn   • Hemorrhoids   • High cholesterol   •  History of depression   • History of stomach ulcers   • Hx of motion sickness   • Indigestion   • Irregular bowel habits   • Loss of appetite   • Migraine   • Migraines   • Nausea   • Nexplanon in place    Placed 12/2020     • Obesity   • Other and unspecified hyperlipidemia   • Pain with bowel movements   • Problems with swallowing    history of   • Sleep disturbance   • Stress   • Uncomfortable fullness after meals   • Weight gain   • Weight loss   [3]  Past Surgical History:  Procedure Laterality Date   • Bunionectomy Bilateral    • Colonoscopy     • Colposcopy, cervix w/upper adjacent vagina; w/biopsy(s), cervix  2008    HPV+   • Fracture surgery      repair of nasal fracture   • Gastro - dmg  08/2016    healed ulcers   • Other      wisdom teeth extraction   • Other surgical history      nose repair 2014   • Other surgical history Right 03/28/2024    anterior cervical discectomy at cervical 6-cervical 7 with placement of arthroplasty device   • Up gi endoscopy,ball dil,30mm N/A 08/09/2016    Procedure: ESOPHAGOGASTRODUODENOSCOPY, POSSIBLE BIOPSY, POSSIBLE POLYPECTOMY 47379;  Surgeon: Sukh Samayoa MD;  Location: [facility]   • Upper gi endo no barretts  05/2016    esophageal ulcer; HH   • Upper gi endoscopy,biopsy N/A 05/17/2016    Procedure: ESOPHAGOGASTRODUODENOSCOPY, POSSIBLE BIOPSY, POSSIBLE POLYPECTOMY 32746;  Surgeon: Sukh Samayoa MD;  Location: [facility]   [4]  No Known Allergies  [5]  Current Outpatient Medications   Medication Sig Dispense Refill   • cephALEXin 500 MG Oral Cap Take by mouth every 8 (eight) hours.     • HYDROcodone-acetaminophen 7.5-325 MG Oral Tab Take 1 tablet by mouth every 4 to 6 hours.     • Norethindrone Acet-Ethinyl Est (ISAIAS 1.5/30) 1.5-30 MG-MCG Oral Tab TAKE 1 TABLET BY MOUTH EVERY DAY 84 tablet 0   • lamoTRIgine 150 MG Oral Tab Take 1 tablet (150 mg total) by mouth 2 (two) times daily. 180 tablet 0   • DESVENLAFAXINE  MG Oral Tablet 24 Hr TAKE 1  TABLET(100 MG) BY MOUTH DAILY 90 tablet 0   • CLONIDINE 0.1 MG Oral Tab TAKE 1 TABLET(0.1 MG) BY MOUTH TWICE DAILY 180 tablet 0   • omeprazole 20 MG Oral Capsule Delayed Release Take 1 capsule (20 mg total) by mouth 2 (two) times daily before meals. 180 capsule 1   • DUPIXENT 300 MG/2ML Subcutaneous Solution Auto-injector      • ALPRAZolam 0.25 MG Oral Tab Take 1 tablet (0.25 mg total) by mouth daily as needed for Anxiety (flying). 6 tablet 0   • semaglutide 2 MG/1.5ML Subcutaneous Solution Pen-injector Inject 0.25 mg into the skin once a week. For weightloss  Sundays     • SUMAtriptan Succinate 100 MG Oral Tab TAKE 1 TABLET BY MOUTH WITHIN 30 MINUTES OF HEADACHE ONSET 9 tablet 0

## 2025-06-02 ENCOUNTER — OFFICE VISIT (OUTPATIENT)
Facility: CLINIC | Age: 50
End: 2025-06-02
Payer: COMMERCIAL

## 2025-06-02 VITALS — WEIGHT: 146 LBS | BODY MASS INDEX: 23.46 KG/M2 | HEIGHT: 66 IN

## 2025-06-02 DIAGNOSIS — M53.3 COCCYDYNIA: Primary | ICD-10-CM

## 2025-06-02 DIAGNOSIS — M79.10 MYALGIA: ICD-10-CM

## 2025-06-02 DIAGNOSIS — M43.17 SPONDYLOLISTHESIS OF LUMBOSACRAL REGION: ICD-10-CM

## 2025-06-02 DIAGNOSIS — M53.3 SACROCOCCYGEAL PAIN: ICD-10-CM

## 2025-06-02 DIAGNOSIS — M51.369 DEGENERATION OF INTERVERTEBRAL DISC OF LUMBAR REGION, UNSPECIFIED WHETHER PAIN PRESENT: ICD-10-CM

## 2025-06-02 PROCEDURE — 76942 ECHO GUIDE FOR BIOPSY: CPT | Performed by: FAMILY MEDICINE

## 2025-06-02 PROCEDURE — 99214 OFFICE O/P EST MOD 30 MIN: CPT | Performed by: FAMILY MEDICINE

## 2025-06-02 PROCEDURE — 20552 NJX 1/MLT TRIGGER POINT 1/2: CPT | Performed by: FAMILY MEDICINE

## 2025-06-02 RX ORDER — TRIAMCINOLONE ACETONIDE 40 MG/ML
40 INJECTION, SUSPENSION INTRA-ARTICULAR; INTRAMUSCULAR ONCE
Status: COMPLETED | OUTPATIENT
Start: 2025-06-02 | End: 2025-06-02

## 2025-06-02 RX ORDER — MELOXICAM 15 MG/1
15 TABLET ORAL DAILY
Qty: 30 TABLET | Refills: 0 | Status: SHIPPED | OUTPATIENT
Start: 2025-06-02 | End: 2025-07-02

## 2025-06-02 RX ORDER — TRAMADOL HYDROCHLORIDE 50 MG/1
TABLET ORAL
Qty: 10 TABLET | Refills: 1 | Status: SHIPPED | OUTPATIENT
Start: 2025-06-02

## 2025-06-02 RX ADMIN — TRIAMCINOLONE ACETONIDE 40 MG: 40 INJECTION, SUSPENSION INTRA-ARTICULAR; INTRAMUSCULAR at 09:10:00

## 2025-06-02 NOTE — PROGRESS NOTES
Sports Medicine Clinic Note    Subjective:    Chief Complaint: Tailbone pain    History: 49-year-old female returns for follow-up evaluation of chronic coccygeal pain. She reports ongoing discomfort despite prior sacrococcygeal injections, with the most recent injection offering no relief. The pain continues to significantly affect her quality of life, and she relies on a donut cushion to mitigate symptoms. She avoids prolonged sitting whenever possible. MRI previously revealed degeneration at the lumbosacral junction. She has not yet attempted pelvic floor therapy and remains interested in avoiding surgical intervention.    Objective:    Sacrococcygeal Examination:    Inspection: Normal gait, no postural abnormalities.  Palpation: Localized tenderness over the coccyx without overlying erythema or swelling.  Range of Motion: Unrestricted lower extremity ROM, non-provocative.  Neurovascular: Intact sensation and motor function. Peripheral pulses present bilaterally.    Diagnostic Tests:    No new testing.    Previous sacral MRI findings: No significant abnormalities at the sacrococcygeal region. Lumbar spine findings include grade 1 anterolisthesis and bilateral spondylolysis at L5-S1, and severe degenerative disc disease at L4-L5.    Assessment:    Persistent coccygeal pain without relief from corticosteroid injections.  Degenerative disc disease at the lumbosacral junction not suspected to be related to coccygeal discomfort.    Plan:    Procedures: Repeat injection today, too soon to repeat sacrococcygeal joint injection but TPI in the region was performed today under ultrasound guidance. After a discussion today regarding surgical options versus alternative interventions, she expressed a desire to consult with pain management once more to try any other non-surgical measures before considering coccygectomy.  Additional Workup: Referral back to Dr. Mccarty for reevaluation of non-surgical management strategies.  Discussed consideration of radiofrequency ablation and coccygectomy depending on symptom progression.  Medications: NSAIDs and Acetaminophen as needed for pain. Consider short-term use of Tramadol if symptoms escalate.  Therapy: Consider to pelvic floor physical therapy for rehabilitation focused on pain modulation and postural reconditioning. Deferred for time being.  Activity Recommendations: Continue to avoid prolonged sitting. Emphasize consistent use of coccygeal cushion. Encourage positional variation and gradual return to tolerated activities.    Follow-Up: Tentatively scheduled for follow-up with pain management to reassess treatment response and plan further intervention.    - - -    Trigger Point Injection Procedure Note:    Consent: Informed consent was obtained after explaining the risks, benefits, and alternatives of the procedure. The patient understood and agreed to proceed.  Pre-Procedure Evaluation: No signs of infection or contraindications for the procedure were noted.  Preparation: The patient was positioned in a comfortable, prone position. The skin over the gluteal musculature was cleaned with an antiseptic solution.  Localization: Ultrasound was used to identify the most tender point near the coccygeus muscles.  Anesthesia: A small amount of topical anesthetic was applied onto the skin for analgesia.  Injection: A 25 gauge needle was used for the injection. After ensuring correct placement with minimal resistance, a mixture of 1 mL of kenalog 40 mg/mL and 2 mL of 1% Lidocaine was injected about the area of pain. The needle was gently redirected in a fan-like pattern to ensure adequate coverage of the affected area.  Post-Injection Care: The needle was withdrawn, and a small adhesive bandage was placed over the injection sites. The patient was observed for immediate adverse reactions and none were noted. Post-procedure instructions were provided, which included: Avoiding strenuous activity for  24-48 hours. Applying ice to the injection site if needed for pain or swelling. Monitoring for signs of infection or allergic reaction.  Post-Procedure Instructions: The patient was advised to gradually resume normal activities and to follow up as scheduled. She was informed that the full effect of the injection might take several days and to contact the office for any concerns or if her symptoms did not improve.  Complications: No immediate complications were noted during or immediately after the procedure.      Jefferson Gonzalez DO, CAQSM   Primary Care Sports Medicine

## 2025-06-03 ENCOUNTER — TELEPHONE (OUTPATIENT)
Dept: FAMILY MEDICINE CLINIC | Facility: CLINIC | Age: 50
End: 2025-06-03

## 2025-06-03 NOTE — TELEPHONE ENCOUNTER
Lauren with [facility] called said they did get the H&P and labs but they need to be signed by MD.    Fax [phone]

## 2025-06-16 ENCOUNTER — OFFICE VISIT (OUTPATIENT)
Dept: PAIN CLINIC | Facility: CLINIC | Age: 50
End: 2025-06-16
Payer: COMMERCIAL

## 2025-06-16 VITALS — SYSTOLIC BLOOD PRESSURE: 104 MMHG | DIASTOLIC BLOOD PRESSURE: 66 MMHG | BODY MASS INDEX: 24 KG/M2 | WEIGHT: 146 LBS

## 2025-06-16 DIAGNOSIS — M53.3 COCCYDYNIA: Primary | ICD-10-CM

## 2025-06-16 PROCEDURE — 99214 OFFICE O/P EST MOD 30 MIN: CPT | Performed by: PHYSICIAN ASSISTANT

## 2025-06-16 NOTE — PATIENT INSTRUCTIONS
Refill policies:    Allow 2-3 business days for refills; controlled substances may take longer.  Contact your pharmacy at least 5 days prior to running out of medication and have them send an electronic request or submit request through the “request refill” option in your [employer] account.  Refills are not addressed on weekends; covering physicians do not authorize routine medications on weekends.  No narcotics or controlled substances are refilled after noon on Fridays or by on call physicians.  By law, narcotics must be electronically prescribed.  A 30 day supply with no refills is the maximum allowed.  If your prescription is due for a refill, you may be due for a follow up appointment.  To best provide you care, patients receiving routine medications need to be seen at least once a year.  Patients receiving narcotic/controlled substance medications need to be seen at least once every 3 months.  In the event that your preferred pharmacy does not have the requested medication in stock (e.g. Backordered), it is your responsibility to find another pharmacy that has the requested medication available.  We will gladly send a new prescription to that pharmacy at your request.    Scheduling Tests:    If your physician has ordered radiology tests such as MRI or CT scans, please contact Central Scheduling at [phone] right away to schedule the test.  Once scheduled, the [facility] Centralized Referral Team will work with your insurance carrier to obtain pre-certification or prior authorization.  Depending on your insurance carrier, approval may take 3-10 days.  It is highly recommended patients assure they have received an authorization before having a test performed.  If test is done without insurance authorization, patient may be responsible for the entire amount billed.      Precertification and Prior Authorizations:  If your physician has recommended that you have a procedure or additional testing performed the [facility]  Centralized Referral Team will contact your insurance carrier to obtain pre-certification or prior authorization.    You are strongly encouraged to contact your insurance carrier to verify that your procedure/test has been approved and is a COVERED benefit.  Although the [facility] Centralized Referral Team does its due diligence, the insurance carrier gives the disclaimer that \"Although the procedure is authorized, this does not guarantee payment.\"    Ultimately the patient is responsible for payment.   Thank you for your understanding in this matter.  Paperwork Completion:  If you require FMLA or disability paperwork for your recovery, please make sure to either drop it off or have it faxed to our office at [phone]. Be sure the form has your name and date of birth on it.  The form will be faxed to our Forms Department and they will complete it for you.  There is a 25$ fee for all forms that need to be filled out.  Please be aware there is a 10-14 day turnaround time.  You will need to sign a release of information (NGA) form if your paperwork does not come with one.  You may call the Forms Department with any questions at [phone].  Their fax number is [phone].

## 2025-06-16 NOTE — PROGRESS NOTES
Patient presents in office today with reported pain in coccyx     Current pain level reported = 6-7/10    Last reported dose of nothing recent due to upcoming surgery      Narcotic Contract renewal none    Urine Drug screen none

## 2025-06-16 NOTE — PROGRESS NOTES
HPI:   Vita Gillis presents with complaints of neck and R UE pain, coccygeal pain.    The pain is described as moderate aching that is intermittent.  The patient’s activity level has remained the same since last visit.  The pain is worst unrelated to time of day.    Changes in condition/history since last visit: Patient is here today for follow-up, having not been seen since Coccygeal inj #2 on 3/14/25 (initial 2/20/24).  Procedure was well-tolerated, and had no adverse effects.  Overall, reports  good relief, and until 10/2024, was pleased with her response.      She has been followed by sports med, Dr. Gonzalez, and had informed him that the injection produced good relief, though by end of 10/2024, had begun to return.  He performed sacrococcygeal injections on 11/18/24, 11/27/24, 4/11/25, and TPI on 6/2/25.  States that these had produced no meaningful relief, and was sent back to discuss additional procedures.      Last procedure: Coccygeal injection date: 3/14/24 (2/20/24)    Percent relief: 80%    Duration: 7 months    Previous procedure: RAMSEY #1    date: 1/18/2024    Percentage of relief experienced from the procedure: 0%    Duration of the relief: N/A    The following activities will increase the patient’s pain: sitting    The following activities decrease the patient’s pain: Standing    Functional Assessment: Patient reports that they are able to complete all of their ADL's such as eating, bathing, using the toilet, dressing and getting up from a bed or a chair independently.    Current Medications:  Current Outpatient Medications   Medication Sig Dispense Refill    Meloxicam 15 MG Oral Tab Take 1 tablet (15 mg total) by mouth daily. 30 tablet 0    cephALEXin 500 MG Oral Cap Take by mouth every 8 (eight) hours.      HYDROcodone-acetaminophen 7.5-325 MG Oral Tab Take 1 tablet by mouth every 4 to 6 hours.      Norethindrone Acet-Ethinyl Est (ISAIAS 1.5/30) 1.5-30 MG-MCG Oral Tab TAKE 1 TABLET BY MOUTH  EVERY DAY 84 tablet 0    lamoTRIgine 150 MG Oral Tab Take 1 tablet (150 mg total) by mouth 2 (two) times daily. 180 tablet 0    DESVENLAFAXINE  MG Oral Tablet 24 Hr TAKE 1 TABLET(100 MG) BY MOUTH DAILY 90 tablet 0    CLONIDINE 0.1 MG Oral Tab TAKE 1 TABLET(0.1 MG) BY MOUTH TWICE DAILY 180 tablet 0    omeprazole 20 MG Oral Capsule Delayed Release Take 1 capsule (20 mg total) by mouth 2 (two) times daily before meals. 180 capsule 1    DUPIXENT 300 MG/2ML Subcutaneous Solution Auto-injector       ALPRAZolam 0.25 MG Oral Tab Take 1 tablet (0.25 mg total) by mouth daily as needed for Anxiety (flying). 6 tablet 0    semaglutide 2 MG/1.5ML Subcutaneous Solution Pen-injector Inject 0.25 mg into the skin once a week. For weightloss  Sundays      SUMAtriptan Succinate 100 MG Oral Tab TAKE 1 TABLET BY MOUTH WITHIN 30 MINUTES OF HEADACHE ONSET 9 tablet 0      Patient requires assistance with: No assistance required    Reviewed Patient History Dated: 2/20/2024 no changes noted    Physical Exam:   There were no vitals taken for this visit.  VAS Pain Score:  /10  General Appearance: Well developed, well nourished, normal build, independent body habitus, no apparent physical disabilities, well groomed    Neurological Exam: WNL-Orientation to time, place and person, normal mood & effect, normal concentration & attention span  Inspection: Nonantalgic, no acute distress   pain to palpation coccygeal tip  Radiology/Lab Test Reviewed: MRI C spine 11/9/23:     C2-C3:  No significant disc/facet abnormality, spinal stenosis, or foraminal narrowing.   C3-C4:  Mild disc desiccation mild disc height loss.  There is a mild right paracentral disc protrusion with slight superior and inferior extrusion measuring approximately 0.8 x 0.2 x 0.9 cm in craniocaudal, AP and transverse dimensions respectively.    There is mild effacement of the ventral thecal sac without significant central canal stenosis.  There is secondary mild right neural  foraminal stenosis.   C4-C5:  Mild disc desiccation and minimal annular disc bulge.  No significant stenosis.   C5-C6:  Mild disc desiccation and minimal annular disc bulge.  No significant stenosis.   C6-C7:  Moderate disc desiccation with disc height loss and broad-based disc bulge/osteophyte complex, including bilateral uncovertebral joint disc/osteophyte complexes, greater on the right relative to the left.  There is secondary mild central canal stenosis and moderate to severe right neural foraminal stenosis.  No significant left foraminal stenosis.   C7-T1:  No significant disc/facet abnormality, spinal stenosis, or foraminal narrowing.          Lab Results   Component Value Date    WBC 7.4 05/29/2025    WBC 6.0 04/11/2025    WBC 7.9 03/06/2024     Lab Results   Component Value Date    HEMOGLOBIN 12.0 10/06/2017     Lab Results   Component Value Date    .0 05/29/2025    .0 04/11/2025    .0 03/06/2024       Do you have any known blood/bleeding disorders?  No  Does patient currently take blood thinners?   None  Does patient currently take any antibiotics?   No  Patient educated and verbalized understanding.  Medical Decision Making:   Diagnosis:    Encounter Diagnosis   Name Primary?    Coccydynia Yes     Impression: ongoing coccygeal pain, which was 80% improved with coccygeal injection #2  on 3/14/24 (initial 2/20/24).  By 10/2024, pain had begun to return, and had undergone sacrococcygeal injections with Dr. Gonzalez on 11/18/24, 11/27/24, and 4/11/25, with TPI on 6/2/25 from which, she has had limited to no relief.  Sent back for consideration of additional injections, and order was placed to repeat coccygeal injection.  If ineffective, she is inquiring about possible RFA, though this is not a procedure that I have seen Dr. Lul xiao.  Would have her follow with him to discuss.      Plan: Patient to schedule the following injection: Coccygeal injection Levels: N/A, Procedure and risks were  discussed with pt. including headache, bleeding, infection and potential nerve damage.  F/u 2-3 weeks.      No orders of the defined types were placed in this encounter.      Meds & Refills for this Visit:  Requested Prescriptions      No prescriptions requested or ordered in this encounter       Imaging & Consults:  None    The patient indicates understanding of these issues and agrees to the plan.    MICKI Hickman

## 2025-06-19 ENCOUNTER — TELEPHONE (OUTPATIENT)
Dept: PAIN CLINIC | Facility: CLINIC | Age: 50
End: 2025-06-19

## 2025-06-19 NOTE — TELEPHONE ENCOUNTER
Prior authorization request completed for: coccygeal injection    Authorization #NO AUTHORIZATION REQUIRED   Authorization dates: N/A  CPT codes approved: 20605  Number of visits/dates of service approved: 1  Physician: Lul   Location: [facility]       Prior authorization search results  Decision ID: [id number]  Please save for your records  In-network Out-of-network  Place of service:  Outpatient Hospital - Non Surgical  Procedure code:  26534 - Arthrocentesis, aspiration and/or injection, intermediate joint or bursa (eg, temporomandibular, acromioclavicular, wrist, elbow or ankle, olecranon bursa); without ultrasound guidance  Diagnosis:  M53.3 - Sacrococcygeal disorders, not elsewhere classified  Date of service:  06/19/2025  TAX ID number (TIN):  [id number]  No requirements found for this procedure code.       Patient can be scheduled. Routed to Navigator.

## 2025-07-08 NOTE — DISCHARGE INSTRUCTIONS
You have been given medication that makes you sleepy. This may have included both pain medication and sleeping medication. Most of the effects have worn off but you may still have some drowsiness for the next 6 to 8 hours.    Home Care  Follow these guidelines when you get home:    --Don't drink any alcohol for the next 24 hours.    --Don't drive, operate dangerous machinery, make important business or personal    decisions, or sign legal documents during the next 24 hours.    Follow Up Care  Follow up with your healthcare provider if you are not alert and back to your usual level of activity within 12 hours    When to seek medical advice  Call your healthcare provider right away if any of these occur:    --Drowsiness that gets worse  --Weakness or dizziness that gets worse  --Repeated vomiting  --You can not be awakened   Home Care Instructions Following Your Pain Procedure     Vita,  It has been a pleasure to have you as our patient. To help you at home, you must follow these general discharge instructions. We will review these with you before you are discharged. It is our hope that you have a complete and uneventful recovery from our procedure.     General Instructions:  What to Expect:  Bandages from your procedure today can be removed when you get home.  Please avoid soaking and/or swimming for 24 hours.  Showering is okay  It is normal to have increased pain symptoms and/or pain at injection site for up to 3-5 days after procedure, you can use heat or ice (20 minutes on 20 minutes off) for comfort.  You may experience some temporary side effects which may include restlessness or insomnia, flushing of the face, or heart palpitations.  Please contact the provider if these symptoms do not resolve within 3-4 days.  Lightheadedness or nausea may occur and should resolve within 24 to 48 hours.  If you develop a headache after treatment, rest, drink fluids (with caffeine, if possible) and take mild  over-the-counter pain medication.  If the headache does not improve with the above treatment, contact the physician.  Home Medications:  Resume all previously prescribed medication.  Please avoid taking NSAIDs (Non-Steriodal Anti-Inflammatory Drugs) such as:  Ibuprofen ( Advil, Motrin) Aleve (Naproxen), Diclofenac, Meloxicam for 6 hours after procedure.   If you are on Coumadin (Warfarin) or any other anti-coagulant (or \"blood thinning\") medication such as Plavix (Clopidogrel), Xarelto (Rivaroxaban), Eliquis (Apixaban), Effient (Prasugrel) etc., restart on the following day from the procedure unless otherwise directed by your provider.  If you are a diabetic, please increase the frequency of your glucose monitoring after the procedure as steroids may cause a temporary (2-3 day) increase in your blood sugar.  Contact your primary care physician if your blood sugar remains elevated as you may require some medication adjustment.  Diet:  Resume your regular diet as tolerated.  Activity:  We recommend that you relax and rest during the rest of your procedure day.  If you feel weakness in your arms or legs do not drive.  Follow-up Appointment  Please schedule a follow-up visit within 3 to 4 weeks after your last procedure date.  Question or Concerns:  Feel free to call our office with any questions or concerns at [phone] (option #2)    Vita  Thank you for coming to [facility] for your procedure.  The nurses try very hard to make sure you receive the best care possible.  Your trust in them as well as us is greatly appreciated.    Thanks so much,   Dr. Ryan Mccarty

## 2025-07-08 NOTE — TELEPHONE ENCOUNTER
Last office visit 5/29/25  Last refilled on 7/10/23 for # 30 with 0 refills  Future Appointments   Date Time Provider Department Center   7/16/2025  1:40 PM Joby Mcclure APRN [facility] [place]   7/22/2025 11:00 AM Shubham Nance PA [facility] [place]   10/9/2025  1:00 PM Jolanta Jin MD [facility] [place]        Thank you.

## 2025-07-08 NOTE — OPERATIVE REPORT
[facility]  Operative Report  2025     Vita Gillis Patient Status:  Hospital Outpatient Surgery    1975 MRN [id number]   Location [facility] PAIN [facility]ENTER Attending Ryan Mccarty MD   Hosp Day # 0 PCP Cary Bacon MD     Indication: Vita is a 49 year old female with coccydynia    Preoperative Diagnosis:  Coccydynia [M53.3]    Postoperative Diagnosis: Same as above.    Procedure performed: COCCYGEAL INJECTION WITH SEDATION    Anesthesia: Local and IV Sedation.    EBL: Less than 1 ml.    Procedure Description:   After reviewing the patient's history and performing a focused physical examination, the diagnosis was confirmed and contraindications such as infection and coagulopathy were ruled out.  Following review of potential side effects and complications, including but not necessarily limited to infection, allergic reaction, local tissue breakdown, nerve injury, and paresis, the patient indicated they understood and agreed to proceed. After obtaining the informed consent, the patient was brought to the procedure room and monitored. Per my order and under my supervision, the patient was sedated with intermittent intravenous doses of versed and fentanyl. The vital signs were monitored and recorded by an experienced RN. The procedure started after the patient was adequately sedated. The moderate intravenous conscious sedation was provided for  6 minutes.    The patient was brought to the procedure room and positioned prone on the procedure table.  After comprehensive monitors were applied, the lumbosacral area was prepped and draped sterilely.  After local anesthetic was instilled in the skin and subcutaneous tissue, a 22 gauge spinal needle was introduced and advanced under fluoroscopy to contact bone at the tip of the coccyx.  Thereafter, Omnipaque 180, 2 mL was injected.  This was negative for vascular spread.  At this point, a combination of normal saline and  Depo-Medrol 40 mg was injected in a total volume of 10 ml. The needle was flushed with 1 mL 1% PF lidocaine.  The patient tolerated the procedure very well and was discharged to a responsible adult after discharge criteria were met.     Complications: None.    Follow up:  Clinic        Ryan Mccarty MD

## 2025-07-08 NOTE — H&P
History & Physical Examination    Patient Name: Vita Gillis  MRN: [id number]  CSN: [id number]  YOB: 1975    Pre-Operative Diagnosis:  Coccydynia [M53.3]    Present Illness: Coccydynia    ASA: 2  MP class: 1  Sedation:  IV sedation (anxiolysis)    Prescriptions Prior to Admission[1]  Current Hospital Medications[2]    Allergies: Allergies[3]    Past Medical History[4]  Past Surgical History[5]  Family History[6]  Social History     Tobacco Use    Smoking status: Former     Current packs/day: 0.00     Average packs/day: 1 pack/day for 18.0 years (18.0 ttl pk-yrs)     Types: Cigarettes     Start date: 1990     Quit date: 2008     Years since quittin.4     Passive exposure: Past    Smokeless tobacco: Never    Tobacco comments:     non-smoker   Substance Use Topics    Alcohol use: Yes     Comment: socially       SYSTEM Check if Review is Normal Check if Physical Exam is Normal If not normal, please explain:   HEENT [x ] [x ]    NECK & BACK [x ] [x ]    HEART [x ] [x ]    LUNGS [x ] [x ]    ABDOMEN [x ] [x ]    UROGENITAL [x ] [x ]    EXTREMITIES [x ] [x ]    OTHER        [ x ] I have discussed the risks and benefits and alternatives with the patient/family.  They understand and agree to proceed with plan of care.  [ x ] I have reviewed the History and Physical done within the last 30 days.  Any changes noted above.    Ryan Mccarty MD              [1]   Facility-Administered Medications Prior to Admission   Medication Dose Route Frequency Provider Last Rate Last Admin    [COMPLETED] triamcinolone acetonide (Kenalog-40) 40 MG/ML injection 40 mg  40 mg Intramuscular Once    40 mg at 25 0910    [COMPLETED] triamcinolone acetonide (Kenalog-40) 40 MG/ML injection 40 mg  40 mg Intra-articular Once    40 mg at 25 1105     Medications Prior to Admission   Medication Sig Dispense Refill Last Dose/Taking    [] Meloxicam 15 MG Oral Tab Take 1 tablet (15 mg total) by mouth  daily. 30 tablet 0     cephALEXin 500 MG Oral Cap Take by mouth every 8 (eight) hours.       HYDROcodone-acetaminophen 7.5-325 MG Oral Tab Take 1 tablet by mouth every 4 to 6 hours.       Norethindrone Acet-Ethinyl Est (ISAIAS 1.5/30) 1.5-30 MG-MCG Oral Tab TAKE 1 TABLET BY MOUTH EVERY DAY 84 tablet 0     lamoTRIgine 150 MG Oral Tab Take 1 tablet (150 mg total) by mouth 2 (two) times daily. 180 tablet 0     DESVENLAFAXINE  MG Oral Tablet 24 Hr TAKE 1 TABLET(100 MG) BY MOUTH DAILY 90 tablet 0     CLONIDINE 0.1 MG Oral Tab TAKE 1 TABLET(0.1 MG) BY MOUTH TWICE DAILY 180 tablet 0     omeprazole 20 MG Oral Capsule Delayed Release Take 1 capsule (20 mg total) by mouth 2 (two) times daily before meals. 180 capsule 1     DUPIXENT 300 MG/2ML Subcutaneous Solution Auto-injector        ALPRAZolam 0.25 MG Oral Tab Take 1 tablet (0.25 mg total) by mouth daily as needed for Anxiety (flying). 6 tablet 0     semaglutide 2 MG/1.5ML Subcutaneous Solution Pen-injector Inject 0.25 mg into the skin once a week. For weightloss  Sundays       SUMAtriptan Succinate 100 MG Oral Tab TAKE 1 TABLET BY MOUTH WITHIN 30 MINUTES OF HEADACHE ONSET 9 tablet 0    [2]   Current Facility-Administered Medications   Medication Dose Route Frequency    lactated ringers infusion  100 mL/hr Intravenous Continuous    ondansetron (Zofran) 4 MG/2ML injection 4 mg  4 mg Intravenous Once PRN   [3] No Known Allergies  [4]   Past Medical History:   Abdominal distention    Abdominal pain    Abnormal uterine bleeding (AUB)    Allergic rhinitis    Anterolisthesis    Anxiety state, unspecified    Back pain    Back problem    Bad breath    Belching    Bloating    Blood in the stool    Constipation    Depression    Dysmenorrhea    Esophageal reflux    Frequent use of laxatives    Heartburn    Hemorrhoids    High cholesterol    History of depression    History of stomach ulcers    Hx of motion sickness    Indigestion    Irregular bowel habits    Loss of appetite     Migraine    Migraines    Nausea    Nexplanon in place    Placed 12/2020      Obesity    Other and unspecified hyperlipidemia    Pain with bowel movements    Problems with swallowing    history of    Sleep disturbance    Stress    Uncomfortable fullness after meals    Weight gain    Weight loss   [5]   Past Surgical History:  Procedure Laterality Date    Bunionectomy Bilateral     Colonoscopy      Colposcopy, cervix w/upper adjacent vagina; w/biopsy(s), cervix  2008    HPV+    Fracture surgery      repair of nasal fracture    Gastro - dmg  08/2016    healed ulcers    Other      wisdom teeth extraction    Other surgical history      nose repair 2014    Other surgical history Right 03/28/2024    anterior cervical discectomy at cervical 6-cervical 7 with placement of arthroplasty device    Up gi endoscopy,ball dil,30mm N/A 08/09/2016    Procedure: ESOPHAGOGASTRODUODENOSCOPY, POSSIBLE BIOPSY, POSSIBLE POLYPECTOMY 78731;  Surgeon: Sukh Samayoa MD;  Location: [facility]    Upper gi endo no barretts  05/2016    esophageal ulcer; HH    Upper gi endoscopy,biopsy N/A 05/17/2016    Procedure: ESOPHAGOGASTRODUODENOSCOPY, POSSIBLE BIOPSY, POSSIBLE POLYPECTOMY 01730;  Surgeon: Sukh Samayoa MD;  Location: [facility]   [6]   Family History  Problem Relation Age of Onset    Alcohol and Other Disorders Associated Father     Homicide History Father         father strangled mother, patient age 8    Psychiatric Sister         postpartum depression    Depression Son     Other (Other) Son         Duchennes muscular dystrophy    Cancer Maternal Grandmother         lung, brain    Breast Cancer Maternal Grandmother 52    Diabetes Maternal Grandmother     Stroke Maternal Grandmother     Anxiety Maternal Grandmother         undiagnosed    Cancer Maternal Grandfather     Stroke Maternal Grandfather     Lipids Maternal Grandfather     Diabetes Maternal Grandfather     Heart Attack Maternal Grandfather     Diabetes Paternal  Grandmother     Depression Paternal Grandmother     Diabetes Paternal Grandfather     Crohn's Disease Maternal Aunt     Breast Cancer Maternal Aunt 62    Depression Other         pat uncle    Suicide History Other         pat uncle completed

## 2025-07-09 NOTE — TELEPHONE ENCOUNTER
Follow-up call post pain procedure. Left message informing patient to contact the pain clinic at [phone] option #3 regarding any questions or concerns about recent pain procedure.       Procedure: COCCYGEAL INJECTION WITH SEDATION   Date: 07/08/25  Follow up Visit Scheduled: 07/22/25

## (undated) DEVICE — COVER LT HNDL RIG FOR SUR CAM DISP

## (undated) DEVICE — SPONGE GZ 4XL4IN 100% COT 12 PLY TYP VII WVN

## (undated) DEVICE — GLOVE,SURG,SENSICARE,ALOE,LF,PF,7: Brand: MEDLINE

## (undated) DEVICE — AVANOS* TUOHY EPIDURAL NEEDLE: Brand: AVANOS

## (undated) DEVICE — MARKER SKIN 2 TIP

## (undated) DEVICE — #15 STERILE STAINLESS BLADE: Brand: STERILE STAINLESS BLADES

## (undated) DEVICE — PAIN TRAY: Brand: MEDLINE INDUSTRIES, INC.

## (undated) DEVICE — PAD SACRAL SPAN AID

## (undated) DEVICE — Device

## (undated) DEVICE — SLEEVE COMPR MD KNEE LEN SGL USE KENDALL SCD

## (undated) DEVICE — ZEISS MD DRAPE

## (undated) DEVICE — COVER,TABLE,44X90,STERILE: Brand: MEDLINE

## (undated) DEVICE — C-ARM: Brand: UNBRANDED

## (undated) DEVICE — SKIN REG/FINE DUAL MARKER, RULER, LABELS: Brand: MEDLINE

## (undated) DEVICE — REMOVER LOT 4OZ NONIRRITATING UNSCNT SFT

## (undated) DEVICE — MARKER SKIN PREP RESIST STRL

## (undated) DEVICE — MONITORING NEUROPHYSIOLOGICAL

## (undated) DEVICE — GLOVE SUR 6.5 SENSICARE PIP WHT PWD F

## (undated) DEVICE — PAD,NON-ADHERENT,3X8,STERILE,LF,1/PK: Brand: MEDLINE

## (undated) DEVICE — SOLUTION IRRIG 1000ML 0.9% NACL USP BTL

## (undated) DEVICE — DRAPE,TOWEL,LARGE,INVISISHIELD: Brand: MEDLINE

## (undated) DEVICE — BANDAGE ADH W1INXL3IN NAT FAB WVN N ADH PD

## (undated) DEVICE — ELECTRODE ES 2.75IN PTFE BLDE MOD E-Z CLN

## (undated) DEVICE — SOLUTION RUBBING 4OZ 70% ISO ALC CLR

## (undated) DEVICE — GLOVE SUR 8 SENSICARE PI PIP CRM PWD F

## (undated) DEVICE — DISPOSABLE SLIM BIPOLAR FORCEPS, NON-STICK,: Brand: SPETZLER-MALIS

## (undated) DEVICE — PACK PAIN MGMT

## (undated) DEVICE — 3M™ STERI-STRIP™ REINFORCED ADHESIVE SKIN CLOSURES, R1547, 1/2 IN X 4 IN (12 MM X 100 MM), 6 STRIPS/ENVELOPE: Brand: 3M™ STERI-STRIP™

## (undated) DEVICE — 3.0MM PRECISION NEURO (MATCH HEAD)

## (undated) DEVICE — GLOVE SUR 7.5 SENSICARE PIP WHT PWD F

## (undated) DEVICE — GLOVE SUR 8 SENSICARE PI PIP GRN PWD F

## (undated) DEVICE — GLV SURG SZ 7.5 THK10MIL WHT ISOLEX SYN

## (undated) DEVICE — NEEDLE SPNL 22GA L3.5IN BLK HUB SS RW FIT

## (undated) DEVICE — TRAY CATH 16FR F INCL BARDX IC COMPLT CARE

## (undated) DEVICE — Device: Brand: INTELLICART™

## (undated) DEVICE — SUTURE MCRYL 3-0 27IN ABSRB UD 19MM PS-2 3/8

## (undated) DEVICE — NEEDLE SPNL 22GA L3.5IN BLK QNCKE STYL DISP

## (undated) DEVICE — 3M™ TEGADERM™ TRANSPARENT FILM DRESSING, 1626W, 4 IN X 4-3/4 IN (10 CM X 12 CM), 50 EACH/CARTON, 4 CARTON/CASE: Brand: 3M™ TEGADERM™

## (undated) DEVICE — KIT HEMSTAT MTRX 8ML PORCINE GEL HUM THROM

## (undated) DEVICE — LAMINECTOMY CDS: Brand: MEDLINE INDUSTRIES, INC.

## (undated) NOTE — MR AVS SNAPSHOT
[facility]  [address] [place] [address]  [phone]               Thank you for choosing us for your health care visit with Carine Carrillo PT.   We are glad to serve you and happy to provide you with this summ Current Medications          This list is accurate as of: 6/9/17 11:50 AM.  Always use your most recent med list.                ALPRAZolam 0.25 MG Tabs   TAKE 1 TABLET BY MOUTH EVERY DAY AS NEEDED FOR ANXIETY   Commonly known as:  Oneal Corona

## (undated) NOTE — LETTER
Vita Gillis, :1975    CONSENT FOR PROCEDURE/SEDATION    1. I authorize the performance upon Vita Gillis  the following: Endometrial Biopsy    2. I authorize Dr. Jolanta Jin MD (and whomever is designated as the doctor’s assistant), to perform the above-mentioned procedures.    3. If any unforeseen conditions arise during this procedure calling for additional  procedures, operations, or medications (including anesthesia and blood transfusion), I further request and authorize the doctor to do whatever he/she deems advisable in my interest.    4. I consent to the taking and reproduction of any photographs in the course of this procedure for professional purposes.    5. I consent to the administration of such sedation as may be considered necessary or advisable by the physician responsible for this service, with the exception of ______________________________________________________    6. I have been informed by my doctor of the nature and purpose of this procedure sedation, possible alternative methods of treatment, risk involved and possible complications.    7. If I have a Do Not Resuscitate (DNR) order in place, the physician and I (or the individual authorized to consent on my behalf) will discuss and agree as to whether the Do Not Resuscitate (DNR) order will remain in effect or will be discontinued during the performance of the procedure and the applicable recovery period. If the Do Not Resuscitate (DNR) order is discontinued and is to be reinstated following the procedure/recovery period, the physician will determine when the applicable recovery period ends for purposes of reinstating the Do Not Resuscitate (DNR) order.    Signature of Patient:_______________________________________________    Signature of person authorized to consent for patient:  _______________________________________________________________    Relationship to patient:  ____________________________________________    Witness: _________________________________________ Date:___________     Physician Signature: _______________________________ Date:___________

## (undated) NOTE — MR AVS SNAPSHOT
[address] [place] [id number] [phone]               Thank you for choosing us for your health care visit with Tracie Darden MD.  We are glad to serve you and happy to provide you with this summary of your v What changed:  Another medication with the same name was removed. Continue taking this medication, and follow the directions you see here.    Commonly known as:  EFFEXOR                   Today's Orders     CBC W DIFFERENTIAL W PLATELET    Complete by:  Feb [email or web address]

## (undated) NOTE — LETTER
Bishop Jennifer Gillis, :1975    CONSENT FOR PROCEDURE/SEDATION    1. I authorize the performance upon James Farnaz  the following: IUD Removal    2.  I authorize Dr. Luisa Anderson MD (and whomever is designated as the doctor’s assi Relationship to patient: ____________________________________________    Witness: _________________________________________ Date:___________     Physician Signature: _______________________________ Date:___________

## (undated) NOTE — LETTER
24    RE: Vita Bowman Carlin     : 1975    Dear Dr. Cary Bacon,    This letter is to inform you that your patient is being scheduled for surgery with Dr. Beckford on 2024 at [facility]. We have asked the patient to contact your office to schedule a pre-operative exam/visit.     Diagnosis: Cervical radiculopathy   Procedure: C6-C7 ADR     A Pre-operative History & Physical is needed for medical clearance. Please address patient's active problems (i.e high blood pressure, breathing issues etc.)  Pre-op labs are scheduled through the [facility] Pre-[facility]mission department. Our pre-operative lab orders are located in our surgery order, if the patient would like these done through your office, you will need to place separate orders.     Please fax clearance letter/office visit note to our office at fax #: [phone]. Your office note must clearly indicate if the patient is medically cleared for surgery or not.    The following orders will be placed by pre-admission testing:  CBC  Comprehensive Metabolic Panel  Type and Screen (if applicable)  PT/PTT/INR  MRSA/MSSA Nasal Swab  (*And any other pertinent testing based on patient's current clinical condition.)    If you have any questions, you may contact our office at [phone], option # 2.    Thank you,      LD Staff

## (undated) NOTE — MR AVS SNAPSHOT
[address] [place] [id number] [phone]               Thank you for choosing us for your health care visit with Eladia Syed MD.  We are glad to serve you and happy to provide you with this summary of your vi insertion. Please use a backup method of contraception for 4-7 days with the Jerardo. When to contact our office  · If you are experiencing discomfort described as worse than menstrual cramps that is not relieved by ibuprofen   · Fever of 100. If you've recently had a stay at the Hospital you can access your discharge instructions in [facility] by going to Visits < Admission Summaries.  If you've been to the Emergency Department or your doctor's office, you can view your past visit information in My

## (undated) NOTE — LETTER
Date: 2024      Patient Name: Vita Gillis      : 1975        Thank you for choosing [facility] as your health care provider. Your physician has deemed the following medical service(s) necessary. However, your insurance plan may not pay for all of your health care and costs and may deny payment for this service. The fact that your insurance plan does not pay for an item or service does not mean you should not receive it. The purpose of this form is to help you make an informed decision about whether or not you want to receive this service(s) that may not be paid for by your insurance plan.    CPT Code Description     Cost     _________ ______________________________  _____________      _________ ______________________________ _____________      _________ ______________________________ _____________      I understand that the above mentioned service(s) or supply may not be covered by my insurance company. I agree to be financially responsible for the cost of this service or supply in the event of my insurance denies payment as a non-covered benefit.        ______________________________________________________________________  Signature of Patient or Patient's Representative  Relationship  Date    ______________________________________________________________________  Signature of Witness to signing of form   Printed Name

## (undated) NOTE — LETTER
Date: 12/12/2018    Patient Name: Hayden Contreras          To Whom it may concern: The above patient was seen at the [facility] for treatment of a medical condition.     This patient should be excused from attending work from 12/12/18

## (undated) NOTE — Clinical Note
Richi Gillis, :1975    CONSENT FOR PROCEDURE/SEDATION    1.  I authorize the performance upon Magda Rdz  the following: skin tag removal.    2. I authorize Dr. Kana Ramirez MD (and whomever is designated as the doctor’s assi Relationship to patient: ____________________________________________    Witness: _________________________________________ Date:___________     Physician Signature: _______________________________ Date:___________

## (undated) NOTE — ED AVS SNAPSHOT
James Osei   MRN: [id number]    Department:  [facility] Emergency Department in [place]   Date of Visit:  10/29/2017           Disclosure     Insurance plans vary and the physician(s) referred by the ER may not be covered by your plan.  Please If you have been prescribed any medication(s), please fill your prescription right away and begin taking the medication(s) as directed    If the emergency physician has read X-rays, these will be re-interpreted by a radiologist.  If there is a significant

## (undated) NOTE — MR AVS SNAPSHOT
[address] [place] [id number] [phone]               Thank you for choosing us for your health care visit with Yani Juarez MD.  We are glad to serve you and happy to provide you with this summary of your vi TAKE 1 TABLET BY MOUTH ONCE DAILY   Commonly known as:  INDERAL           SUMAtriptan Succinate 50 MG Tabs   Take 1 tablet by mouth daily as needed for Migraine.    Commonly known as:  IMITREX           TraMADol HCl 50 MG Tabs   Take 1 tablet (50 mg total)

## (undated) NOTE — MR AVS SNAPSHOT
[address] [place] [address] [phone]               Thank you for choosing us for your health care visit with Sheila Ambrocio MD.  We are glad to serve you and happy to provide you with this summary of your v Dannielle, [facility], [address]    [address] [place] [address] [address]   [address]    [place] [address]:  [address] [facility] in [facility] in [address] [facility] [address] Commonly known as:  IMITREX                Where to Get Your Medications      These medications were sent to [address], [address] [facility] [address], [phone], [address], [facility]

## (undated) NOTE — ED AVS SNAPSHOT
[facility] in [address] [address]    Phone:  [phone]    Fax:  Nakia   MRN: [id number]    Department:  [facility] in [facility]   Date of Visit:  5/31/2017 Continue placing a warm compress to the area. Keep the area clean and dry.     Discharge References/Attachments     ABSCESS, INCISION AND DRAINAGE (ENGLISH)      Disclosure     Insurance plans vary and the physician(s) referred by the Immediate Care may no IF THERE IS ANY CHANGE OR WORSENING OF YOUR CONDITION, CALL YOUR PRIMARY CARE PHYSICIAN AT ONCE OR GO TO THE EMERGENCY DEPARTMENT.     If you have been prescribed any medication(s), please fill your prescription right away and begin taking the medication(s) Patient [address] to help you get signed up for insurance coverage. Patient [address] is a Federal Navigator program that can help with your Affordable Care Act coverage, as well as all types of Medicaid plans.   To get signed up and covere

## (undated) NOTE — Clinical Note
Celine Gillis, :1975    CONSENT FOR PROCEDURE/SEDATION    1. I authorize the performance upon Brittney Flanagan  the following: IUD Mirena    2.  I authorize Dr. Alicia Chu MD (and whomever is designated as the doctor’s assistant), Witness: _________________________________________ Date:___________     Physician Signature: _______________________________ Date:___________

## (undated) NOTE — MR AVS SNAPSHOT
[address] [facility] [address] [phone]               Thank you for choosing us for your health care visit with Eduardo Bhat MD.  We are glad to serve you and happy to provide you with this summary of your v - how much to take  - how to take this  - additional instructions   Commonly known as:  EFFEXOR                Where to Get Your Medications      These medications were sent to [address] [place], [place] [address] [place] [address] RD AT [address] [place] [address] 71

## (undated) NOTE — MR AVS SNAPSHOT
[address] [place] [address] [phone]               Thank you for choosing us for your health care visit with Riaz Regan MD.  We are glad to serve you and happy to provide you with this summary of your v Commonly known as:  IMITREX           Venlafaxine HCl  MG Tb24   Take 150 mg by mouth.    Commonly known as:  EFFEXOR                Where to Get Your Medications      These medications were sent to Adrián  [address], [address] [facility]

## (undated) NOTE — LETTER
10/22/21        [address] [address]      Dear Gracie Ulloa,    [facility] records indicate that you have outstanding lab work and or testing that was ordered for you and has not yet been completed:  Orders Placed This Encoun

## (undated) NOTE — LETTER
Vita Carrollett, :1975    CONSENT FOR PROCEDURE/SEDATION    1. I authorize the performance upon Vita Gillis  the following: Removal only Nexplanon    2. I authorize JESSICA Magana (and whomever is designated as the doctor’s assistant), to perform the above-mentioned procedures.    3. If any unforeseen conditions arise during this procedure calling for additional  procedures, operations, or medications (including anesthesia and blood transfusion), I further request and authorize the doctor to do whatever he/she deems advisable in my interest.    4. I consent to the taking and reproduction of any photographs in the course of this procedure for professional purposes.    5. I consent to the administration of such sedation as may be considered necessary or advisable by the physician responsible for this service, with the exception of ______________________________________________________    6. I have been informed by my doctor of the nature and purpose of this procedure sedation, possible alternative methods of treatment, risk involved and possible complications.    7. If I have a Do Not Resuscitate (DNR) order in place, the physician and I (or the individual authorized to consent on my behalf) will discuss and agree as to whether the Do Not Resuscitate (DNR) order will remain in effect or will be discontinued during the performance of the procedure and the applicable recovery period. If the Do Not Resuscitate (DNR) order is discontinued and is to be reinstated following the procedure/recovery period, the physician will determine when the applicable recovery period ends for purposes of reinstating the Do Not Resuscitate (DNR) order.    Signature of Patient:_______________________________________________    Signature of person authorized to consent for patient:  _______________________________________________________________    Relationship to patient:  ____________________________________________    Witness: _________________________________________ Date:___________     Physician Signature: _______________________________ Date:___________

## (undated) NOTE — LETTER
Rakesh Gillis, :1975    CONSENT FOR PROCEDURE/SEDATION    1. I authorize the performance upon Jazzy Cody  the following: Nexplanon Insertion    2.  I authorize Dr. Yu Cummings MD (and whomever is designated as the doctor’s assist Relationship to patient: ____________________________________________    Witness: _________________________________________ Date:___________     Physician Signature: _______________________________ Date:___________

## (undated) NOTE — MR AVS SNAPSHOT
[address] [place] [address] [phone]               Thank you for choosing us for your health care visit with Yani Juarez MD.  We are glad to serve you and happy to provide you with this summary of your vi * Notice: This list has 2 medication(s) that are the same as other medications prescribed for you. Read the directions carefully, and ask your doctor or other care provider to review them with you.             MyChart     Visit creads  You can access you